# Patient Record
Sex: MALE | Race: WHITE | NOT HISPANIC OR LATINO | Employment: FULL TIME | URBAN - METROPOLITAN AREA
[De-identification: names, ages, dates, MRNs, and addresses within clinical notes are randomized per-mention and may not be internally consistent; named-entity substitution may affect disease eponyms.]

---

## 2017-06-06 ENCOUNTER — OFFICE VISIT (OUTPATIENT)
Dept: FAMILY MEDICINE | Facility: CLINIC | Age: 52
End: 2017-06-06
Payer: COMMERCIAL

## 2017-06-06 VITALS
WEIGHT: 219 LBS | HEIGHT: 69 IN | TEMPERATURE: 97.7 F | DIASTOLIC BLOOD PRESSURE: 66 MMHG | SYSTOLIC BLOOD PRESSURE: 102 MMHG | BODY MASS INDEX: 32.44 KG/M2 | HEART RATE: 64 BPM

## 2017-06-06 DIAGNOSIS — E78.5 HYPERLIPIDEMIA LDL GOAL <70: ICD-10-CM

## 2017-06-06 DIAGNOSIS — Z12.5 SCREENING FOR PROSTATE CANCER: ICD-10-CM

## 2017-06-06 DIAGNOSIS — I25.10 CARDIOVASCULAR DISEASE: ICD-10-CM

## 2017-06-06 DIAGNOSIS — N52.9 ERECTILE DYSFUNCTION, UNSPECIFIED ERECTILE DYSFUNCTION TYPE: ICD-10-CM

## 2017-06-06 DIAGNOSIS — I10 ESSENTIAL HYPERTENSION: ICD-10-CM

## 2017-06-06 DIAGNOSIS — Z00.00 ENCOUNTER FOR ROUTINE ADULT HEALTH EXAMINATION WITHOUT ABNORMAL FINDINGS: Primary | ICD-10-CM

## 2017-06-06 DIAGNOSIS — K21.9 GASTROESOPHAGEAL REFLUX DISEASE WITHOUT ESOPHAGITIS: ICD-10-CM

## 2017-06-06 LAB
ANION GAP SERPL CALCULATED.3IONS-SCNC: 4 MMOL/L (ref 3–14)
BUN SERPL-MCNC: 16 MG/DL (ref 7–30)
CALCIUM SERPL-MCNC: 9.1 MG/DL (ref 8.5–10.1)
CHLORIDE SERPL-SCNC: 102 MMOL/L (ref 94–109)
CHOLEST SERPL-MCNC: 115 MG/DL
CO2 SERPL-SCNC: 33 MMOL/L (ref 20–32)
CREAT SERPL-MCNC: 0.97 MG/DL (ref 0.66–1.25)
GFR SERPL CREATININE-BSD FRML MDRD: 81 ML/MIN/1.7M2
GLUCOSE SERPL-MCNC: 107 MG/DL (ref 70–99)
HDLC SERPL-MCNC: 37 MG/DL
LDLC SERPL CALC-MCNC: 60 MG/DL
NONHDLC SERPL-MCNC: 78 MG/DL
POTASSIUM SERPL-SCNC: 4.6 MMOL/L (ref 3.4–5.3)
PSA SERPL-ACNC: 0.56 UG/L (ref 0–4)
SODIUM SERPL-SCNC: 139 MMOL/L (ref 133–144)
TRIGL SERPL-MCNC: 91 MG/DL

## 2017-06-06 PROCEDURE — 36415 COLL VENOUS BLD VENIPUNCTURE: CPT | Performed by: FAMILY MEDICINE

## 2017-06-06 PROCEDURE — G0103 PSA SCREENING: HCPCS | Performed by: FAMILY MEDICINE

## 2017-06-06 PROCEDURE — 80048 BASIC METABOLIC PNL TOTAL CA: CPT | Performed by: FAMILY MEDICINE

## 2017-06-06 PROCEDURE — 99396 PREV VISIT EST AGE 40-64: CPT | Performed by: FAMILY MEDICINE

## 2017-06-06 PROCEDURE — 80061 LIPID PANEL: CPT | Performed by: FAMILY MEDICINE

## 2017-06-06 RX ORDER — NITROGLYCERIN 0.4 MG/1
0.4 TABLET SUBLINGUAL EVERY 5 MIN PRN
Qty: 25 TABLET | Refills: 3 | Status: ON HOLD | OUTPATIENT
Start: 2017-06-06 | End: 2017-12-20

## 2017-06-06 RX ORDER — SIMVASTATIN 40 MG
40 TABLET ORAL AT BEDTIME
Qty: 90 TABLET | Refills: 3 | Status: SHIPPED | OUTPATIENT
Start: 2017-06-06 | End: 2017-06-16

## 2017-06-06 RX ORDER — OMEPRAZOLE 20 MG/1
20 TABLET, DELAYED RELEASE ORAL DAILY
Qty: 90 TABLET | Refills: 3 | Status: SHIPPED | OUTPATIENT
Start: 2017-06-06 | End: 2018-06-11

## 2017-06-06 RX ORDER — TADALAFIL 20 MG/1
10-20 TABLET ORAL DAILY PRN
Qty: 6 TABLET | Refills: 11 | Status: ON HOLD | OUTPATIENT
Start: 2017-06-06 | End: 2017-12-20

## 2017-06-06 RX ORDER — ATENOLOL 50 MG/1
50 TABLET ORAL DAILY
Qty: 90 TABLET | Refills: 3 | Status: SHIPPED | OUTPATIENT
Start: 2017-06-06 | End: 2017-06-16

## 2017-06-06 RX ORDER — LISINOPRIL 10 MG/1
10 TABLET ORAL DAILY
Qty: 90 TABLET | Refills: 3 | Status: SHIPPED | OUTPATIENT
Start: 2017-06-06 | End: 2017-06-16

## 2017-06-06 NOTE — MR AVS SNAPSHOT
After Visit Summary   6/6/2017    Latrell Al    MRN: [id number]           Patient Information     Date Of Birth          1965        Visit Information        Provider Department      6/6/2017 8:20 AM Gerald Roche MD [facility]        Today's Diagnoses     Encounter for routine adult health examination without abnormal findings    -  1    Cardiovascular disease        Essential hypertension        Hyperlipidemia LDL goal <70        Erectile dysfunction, unspecified erectile dysfunction type        Screening for prostate cancer        Gastroesophageal reflux disease without esophagitis          Care Instructions    Please go to lab.    I have refilled all of your meds.    Your colonoscopy is due next year in 2018.      Preventive Health Recommendations  Male Ages 50 - 64    Yearly exam:             See your health care provider every year in order to  o   Review health changes.   o   Discuss preventive care.    o   Review your medicines if your doctor has prescribed any.     Have a cholesterol test every 5 years, or more frequently if you are at risk for high cholesterol/heart disease.     Have a diabetes test (fasting glucose) every three years. If you are at risk for diabetes, you should have this test more often.     Have a colonoscopy at age 50, or have a yearly FIT test (stool test). These exams will check for colon cancer.      Talk with your health care provider about whether or not a prostate cancer screening test (PSA) is right for you.    You should be tested each year for STDs (sexually transmitted diseases), if you re at risk.     Shots: Get a flu shot each year. Get a tetanus shot every 10 years.     Nutrition:    Eat at least 5 servings of fruits and vegetables daily.     Eat whole-grain bread, whole-wheat pasta and brown rice instead of white grains and rice.     Talk to your provider about Calcium and Vitamin D.     Lifestyle    Exercise for at least 150  "minutes a week (30 minutes a day, 5 days a week). This will help you control your weight and prevent disease.     Limit alcohol to one drink per day.     No smoking.     Wear sunscreen to prevent skin cancer.     See your dentist every six months for an exam and cleaning.     See your eye doctor every 1 to 2 years.            Follow-ups after your visit        Who to contact     If you have questions or need follow up information about today's clinic visit or your schedule please contact [facility] directly at [phone].  Normal or non-critical lab and imaging results will be communicated to you by Codacyhart, letter or phone within 4 business days after the clinic has received the results. If you do not hear from us within 7 days, please contact the clinic through Nevro or phone. If you have a critical or abnormal lab result, we will notify you by phone as soon as possible.  Submit refill requests through Nevro or call your pharmacy and they will forward the refill request to us. Please allow 3 business days for your refill to be completed.          Additional Information About Your Visit        Nevro Information     Nevro gives you secure access to your electronic health record. If you see a primary care provider, you can also send messages to your care team and make appointments. If you have questions, please call your primary care clinic.  If you do not have a primary care provider, please call [phone] and they will assist you.        Care EveryWhere ID     This is your Care EveryWhere ID. This could be used by other organizations to access your [place] medical records  [id number]        Your Vitals Were     Pulse Temperature Height BMI (Body Mass Index)          64 97.7  F (36.5  C) (Tympanic) 5' 9\" (1.753 m) 32.34 kg/m2         Blood Pressure from Last 3 Encounters:   06/06/17 102/66   06/03/16 132/75   07/13/15 116/82    Weight from Last 3 Encounters:   06/06/17 219 lb (99.3 kg) "   06/03/16 233 lb 6.4 oz (105.9 kg)   07/13/15 224 lb (101.6 kg)              We Performed the Following     Basic metabolic panel     LIPID REFLEX TO DIRECT LDL PANEL     Prostate spec antigen screen          Today's Medication Changes          These changes are accurate as of: 6/6/17  8:52 AM.  If you have any questions, ask your nurse or doctor.               These medicines have changed or have updated prescriptions.        Dose/Directions    tadalafil 20 MG tablet   Commonly known as:  CIALIS   This may have changed:  additional instructions   Used for:  Erectile dysfunction, unspecified erectile dysfunction type   Changed by:  Gerald Roche MD        Dose:  10-20 mg   Take 0.5-1 tablets (10-20 mg) by mouth daily as needed for erectile dysfunction Hold on file until needed   Quantity:  6 tablet   Refills:  11            Where to get your medicines      These medications were sent to [facility] [place], [place] - [address]  [address] [address]     Phone:  [phone]     atenolol 50 MG tablet    lisinopril 10 MG tablet    nitroglycerin 0.4 MG sublingual tablet    omeprazole 20 MG tablet    simvastatin 40 MG tablet    tadalafil 20 MG tablet                Primary Care Provider Office Phone # Fax #    Gerald Roche [phone]       [facility] [address] [address]        Thank you!     Thank you for choosing [facility]  for your care. Our goal is always to provide you with excellent care. Hearing back from our patients is one way we can continue to improve our services. Please take a few minutes to complete the written survey that you may receive in the mail after your visit with us. Thank you!             Your Updated Medication List - Protect others around you: Learn how to safely use, store and throw away your medicines at www.disposemymeds.org.          This list is accurate as of: 6/6/17  8:52 AM.   Always use your most recent med list.                   Brand Name Dispense Instructions for use    aspirin 81 MG tablet      ONE DAILY       atenolol 50 MG tablet    TENORMIN    90 tablet    Take 1 tablet (50 mg) by mouth daily       lisinopril 10 MG tablet    PRINIVIL/ZESTRIL    90 tablet    Take 1 tablet (10 mg) by mouth daily       nitroglycerin 0.4 MG sublingual tablet    NITROSTAT    25 tablet    Place 1 tablet (0.4 mg) under the tongue every 5 minutes as needed up to 3 tablets per episode       omeprazole 20 MG tablet    priLOSEC OTC    90 tablet    Take 1 tablet (20 mg) by mouth daily       simvastatin 40 MG tablet    ZOCOR    90 tablet    Take 1 tablet (40 mg) by mouth At Bedtime at bedtime.       tadalafil 20 MG tablet    CIALIS    6 tablet    Take 0.5-1 tablets (10-20 mg) by mouth daily as needed for erectile dysfunction Hold on file until needed

## 2017-06-06 NOTE — NURSING NOTE
"Chief Complaint   Patient presents with     Physical     is fasting for labs       Initial /66 (Cuff Size: Adult Large)  Pulse 64  Temp 97.7  F (36.5  C) (Tympanic)  Ht 5' 9\" (1.753 m)  Wt 219 lb (99.3 kg)  BMI 32.34 kg/m2 Estimated body mass index is 32.34 kg/(m^2) as calculated from the following:    Height as of this encounter: 5' 9\" (1.753 m).    Weight as of this encounter: 219 lb (99.3 kg).  Medication Reconciliation: complete  "

## 2017-06-06 NOTE — PROGRESS NOTES
SUBJECTIVE:     CC: Latrell Al is an 51 year old male who presents for preventative health visit.   Chief Complaint   Patient presents with     Physical     is fasting for labs       Healthy Habits:    Do you get at least three servings of calcium containing foods daily (dairy, green leafy vegetables, etc.)? yes    Amount of exercise or daily activities, outside of work: 7 day(s) per week    Problems taking medications regularly No    Medication side effects: No    Have you had an eye exam in the past two years? yes    Do you see a dentist twice per year? No    Do you have sleep apnea, excessive snoring or daytime drowsiness?no            Today's PHQ-2 Score:   PHQ-2 ( 1999 Pfizer) 6/6/2017 6/3/2016   Q1: Little interest or pleasure in doing things 0 0   Q2: Feeling down, depressed or hopeless 0 0   PHQ-2 Score 0 0       Abuse: Current or Past(Physical, Sexual or Emotional)- No  Do you feel safe in your environment - Yes    Social History   Substance Use Topics     Smoking status: Former Smoker     Packs/day: 0.50     Years: 25.00     Types: Cigarettes, Cigars     Quit date: 1/1/2008     Smokeless tobacco: Never Used     Alcohol use Yes      Comment: 3-6 cans of beer per month     The patient does not drink >3 drinks per day nor >7 drinks per week.    Last PSA:   PSA   Date Value Ref Range Status   06/03/2016 0.55 0 - 4 ug/L Final       Recent Labs   Lab Test  06/03/16   0955  06/01/15   1037  05/19/14   1650   CHOL  120  120  128   HDL  36*  45  44   LDL  61  51  62   TRIG  114  120  107   CHOLHDLRATIO   --   2.7  3.0   NHDL  84   --    --        Reviewed orders with patient. Reviewed health maintenance and updated orders accordingly - Yes    Reviewed and updated as needed this visit by clinical staff  Tobacco  Allergies  Med Hx  Surg Hx  Fam Hx  Soc Hx        Reviewed and updated as needed this visit by Provider            ROS:  Review Of Systems  Skin: negative  Eyes: negative  Ears/Nose/Throat:  "negative  Respiratory: No shortness of breath, dyspnea on exertion, cough, or hemoptysis  Cardiovascular: negative  Gastrointestinal: negative  Genitourinary: negative  Musculoskeletal: negative  Neurologic: negative  Psychiatric: negative  Hematologic/Lymphatic/Immunologic: negative  Endocrine: negative       Problem list, Medication list, Allergies, and Medical/Social/Surgical histories reviewed in [facility] and updated as appropriate.  OBJECTIVE:     /66 (Cuff Size: Adult Large)  Pulse 64  Temp 97.7  F (36.5  C) (Tympanic)  Ht 5' 9\" (1.753 m)  Wt 219 lb (99.3 kg)  BMI 32.34 kg/m2  EXAM:  GENERAL: healthy, alert and no distress  EYES: Eyes grossly normal to inspection, PERRL and conjunctivae and sclerae normal  HENT: ear canals and TM's normal, nose and mouth without ulcers or lesions  NECK: no adenopathy, no asymmetry, masses, or scars and thyroid normal to palpation  RESP: lungs clear to auscultation - no rales, rhonchi or wheezes  CV: regular rate and rhythm, normal S1 S2, no S3 or S4, no murmur, click or rub, no peripheral edema and peripheral pulses strong  ABDOMEN: soft, nontender, no hepatosplenomegaly, no masses and bowel sounds normal   (male): normal male genitalia without lesions or urethral discharge, no hernia  MS: no gross musculoskeletal defects noted, no edema  SKIN: no suspicious lesions or rashes  NEURO: Normal strength and tone, mentation intact and speech normal  PSYCH: mentation appears normal, affect normal/bright  LYMPH: anterior cervical: no adenopathy  posterior cervical: no adenopathy    ASSESSMENT/PLAN:     (Z00.00) Encounter for routine adult health examination without abnormal findings  (primary encounter diagnosis)  Comment: Discussed healthy lifestyle and preventative cares.    Plan:     (I25.10) Cardiovascular disease  Comment: stable  Plan: LIPID REFLEX TO DIRECT LDL PANEL, lisinopril         (PRINIVIL/ZESTRIL) 10 MG tablet, atenolol         (TENORMIN) 50 MG tablet, " "nitroglycerin         (NITROSTAT) 0.4 MG sublingual tablet            (I10) Essential hypertension  Comment: controlled  Plan: Basic metabolic panel        Refilled med and check for side effects    (E78.5) Hyperlipidemia LDL goal <70  Comment: check labs and refilled med  Plan: LIPID REFLEX TO DIRECT LDL PANEL, simvastatin         (ZOCOR) 40 MG tablet            (N52.9) Erectile dysfunction, unspecified erectile dysfunction type  Comment: doing well, using med prn  Plan: tadalafil (CIALIS) 20 MG tablet            (Z12.5) Screening for prostate cancer  Comment:   Plan: Prostate spec antigen screen            (K21.9) Gastroesophageal reflux disease without esophagitis  Comment: controlled  Plan: omeprazole (PRILOSEC OTC) 20 MG tablet              COUNSELING:  Reviewed preventive health counseling, as reflected in patient instructions       Regular exercise       Healthy diet/nutrition       Vision screening       Hearing screening       Colon cancer screening       Prostate cancer screening         reports that he quit smoking about 9 years ago. His smoking use included Cigarettes and Cigars. He has a 12.50 pack-year smoking history. He has never used smokeless tobacco.    Estimated body mass index is 32.34 kg/(m^2) as calculated from the following:    Height as of this encounter: 5' 9\" (1.753 m).    Weight as of this encounter: 219 lb (99.3 kg).   Weight management plan: diet and exercise    Counseling Resources:  ATP IV Guidelines  Pooled Cohorts Equation Calculator  FRAX Risk Assessment  ICSI Preventive Guidelines  Dietary Guidelines for Americans, 2010  USDA's MyPlate  ASA Prophylaxis  Lung CA Screening    Gerald Roche MD  [facility]  "

## 2017-06-06 NOTE — PATIENT INSTRUCTIONS
Please go to lab.    I have refilled all of your meds.    Your colonoscopy is due next year in 2018.      Preventive Health Recommendations  Male Ages 50   64    Yearly exam:             See your health care provider every year in order to  o   Review health changes.   o   Discuss preventive care.    o   Review your medicines if your doctor has prescribed any.     Have a cholesterol test every 5 years, or more frequently if you are at risk for high cholesterol/heart disease.     Have a diabetes test (fasting glucose) every three years. If you are at risk for diabetes, you should have this test more often.     Have a colonoscopy at age 50, or have a yearly FIT test (stool test). These exams will check for colon cancer.      Talk with your health care provider about whether or not a prostate cancer screening test (PSA) is right for you.    You should be tested each year for STDs (sexually transmitted diseases), if you re at risk.     Shots: Get a flu shot each year. Get a tetanus shot every 10 years.     Nutrition:    Eat at least 5 servings of fruits and vegetables daily.     Eat whole-grain bread, whole-wheat pasta and brown rice instead of white grains and rice.     Talk to your provider about Calcium and Vitamin D.     Lifestyle    Exercise for at least 150 minutes a week (30 minutes a day, 5 days a week). This will help you control your weight and prevent disease.     Limit alcohol to one drink per day.     No smoking.     Wear sunscreen to prevent skin cancer.     See your dentist every six months for an exam and cleaning.     See your eye doctor every 1 to 2 years.

## 2017-06-06 NOTE — LETTER
[facility]  [address] [place] [address]  Phone: [phone]    June 7, 2017    Latrell Al  [address] [place] [address]          Dear Mr. Al,    The results of your recent lab tests were within normal limits. Enclosed is a copy of these results.  If you have any further questions or problems, please contact our office.      Yaa Sams,   You have no signs of prostate cancer.   You have normal kidney function.   Your cholesterol is looking good.   No signs of diabetes.   Recheck labs in one year.       Component      Latest Ref Rng & Units 6/6/2017   Sodium      133 - 144 mmol/L 139   Potassium      3.4 - 5.3 mmol/L 4.6   Chloride      94 - 109 mmol/L 102   Carbon Dioxide      20 - 32 mmol/L 33 (H)   Anion Gap      3 - 14 mmol/L 4   Glucose      70 - 99 mg/dL 107 (H)   Urea Nitrogen      7 - 30 mg/dL 16   Creatinine      0.66 - 1.25 mg/dL 0.97   GFR Estimate      >60 mL/min/1.7m2 81   GFR Estimate If Black      >60 mL/min/1.7m2 >90 . . .   Calcium      8.5 - 10.1 mg/dL 9.1   Cholesterol      <200 mg/dL 115   Triglycerides      <150 mg/dL 91   HDL Cholesterol      >39 mg/dL 37 (L)   LDL Cholesterol Calculated      <100 mg/dL 60   Non HDL Cholesterol      <130 mg/dL 78   PSA      0 - 4 ug/L 0.56     Sincerely,      Gerald Roche MD / BS

## 2017-06-11 DIAGNOSIS — I10 ESSENTIAL HYPERTENSION: ICD-10-CM

## 2017-06-11 DIAGNOSIS — I25.10 CARDIOVASCULAR DISEASE: ICD-10-CM

## 2017-06-11 DIAGNOSIS — E78.5 HYPERLIPIDEMIA LDL GOAL <70: ICD-10-CM

## 2017-06-16 ENCOUNTER — MYC MEDICAL ADVICE (OUTPATIENT)
Dept: FAMILY MEDICINE | Facility: CLINIC | Age: 52
End: 2017-06-16

## 2017-06-16 DIAGNOSIS — I25.10 CARDIOVASCULAR DISEASE: ICD-10-CM

## 2017-06-16 DIAGNOSIS — E78.5 HYPERLIPIDEMIA LDL GOAL <70: ICD-10-CM

## 2017-06-16 RX ORDER — ATENOLOL 50 MG/1
50 TABLET ORAL DAILY
Qty: 90 TABLET | Refills: 3 | Status: SHIPPED | OUTPATIENT
Start: 2017-06-16 | End: 2018-06-11

## 2017-06-16 RX ORDER — LISINOPRIL 10 MG/1
10 TABLET ORAL DAILY
Qty: 90 TABLET | Refills: 3 | Status: SHIPPED | OUTPATIENT
Start: 2017-06-16 | End: 2018-06-11

## 2017-06-16 RX ORDER — SIMVASTATIN 40 MG
40 TABLET ORAL AT BEDTIME
Qty: 90 TABLET | Refills: 3 | Status: SHIPPED | OUTPATIENT
Start: 2017-06-16 | End: 2018-06-11

## 2017-06-20 RX ORDER — SIMVASTATIN 40 MG
TABLET ORAL
Qty: 90 TABLET | Refills: 3 | OUTPATIENT
Start: 2017-06-20

## 2017-06-20 RX ORDER — ATENOLOL 50 MG/1
TABLET ORAL
Qty: 90 TABLET | Refills: 3 | OUTPATIENT
Start: 2017-06-20

## 2017-12-14 ENCOUNTER — OFFICE VISIT (OUTPATIENT)
Dept: FAMILY MEDICINE | Facility: CLINIC | Age: 52
End: 2017-12-14
Payer: COMMERCIAL

## 2017-12-14 ENCOUNTER — RADIANT APPOINTMENT (OUTPATIENT)
Dept: GENERAL RADIOLOGY | Facility: CLINIC | Age: 52
End: 2017-12-14
Attending: NURSE PRACTITIONER
Payer: COMMERCIAL

## 2017-12-14 VITALS
BODY MASS INDEX: 34.26 KG/M2 | WEIGHT: 232 LBS | TEMPERATURE: 97.2 F | OXYGEN SATURATION: 99 % | DIASTOLIC BLOOD PRESSURE: 78 MMHG | SYSTOLIC BLOOD PRESSURE: 125 MMHG | HEART RATE: 81 BPM

## 2017-12-14 DIAGNOSIS — M79.671 RIGHT FOOT PAIN: ICD-10-CM

## 2017-12-14 DIAGNOSIS — M79.671 RIGHT FOOT PAIN: Primary | ICD-10-CM

## 2017-12-14 DIAGNOSIS — L03.115 CELLULITIS OF RIGHT LOWER EXTREMITY: ICD-10-CM

## 2017-12-14 LAB
CRP SERPL-MCNC: 43.4 MG/L (ref 0–8)
ERYTHROCYTE [DISTWIDTH] IN BLOOD BY AUTOMATED COUNT: 13.9 % (ref 10–15)
ERYTHROCYTE [SEDIMENTATION RATE] IN BLOOD BY WESTERGREN METHOD: 10 MM/H (ref 0–20)
HCT VFR BLD AUTO: 45 % (ref 40–53)
HGB BLD-MCNC: 14.9 G/DL (ref 13.3–17.7)
MCH RBC QN AUTO: 30 PG (ref 26.5–33)
MCHC RBC AUTO-ENTMCNC: 33.1 G/DL (ref 31.5–36.5)
MCV RBC AUTO: 91 FL (ref 78–100)
PLATELET # BLD AUTO: 186 10E9/L (ref 150–450)
RBC # BLD AUTO: 4.97 10E12/L (ref 4.4–5.9)
URATE SERPL-MCNC: 5.3 MG/DL (ref 3.5–7.2)
WBC # BLD AUTO: 13.6 10E9/L (ref 4–11)

## 2017-12-14 PROCEDURE — 86140 C-REACTIVE PROTEIN: CPT | Performed by: NURSE PRACTITIONER

## 2017-12-14 PROCEDURE — 99214 OFFICE O/P EST MOD 30 MIN: CPT | Performed by: NURSE PRACTITIONER

## 2017-12-14 PROCEDURE — 85027 COMPLETE CBC AUTOMATED: CPT | Performed by: NURSE PRACTITIONER

## 2017-12-14 PROCEDURE — 73660 X-RAY EXAM OF TOE(S): CPT | Mod: RT

## 2017-12-14 PROCEDURE — 84550 ASSAY OF BLOOD/URIC ACID: CPT | Performed by: NURSE PRACTITIONER

## 2017-12-14 PROCEDURE — 85652 RBC SED RATE AUTOMATED: CPT | Performed by: NURSE PRACTITIONER

## 2017-12-14 PROCEDURE — 36415 COLL VENOUS BLD VENIPUNCTURE: CPT | Performed by: NURSE PRACTITIONER

## 2017-12-14 RX ORDER — CEPHALEXIN 500 MG/1
500 CAPSULE ORAL 3 TIMES DAILY
Qty: 30 CAPSULE | Refills: 0 | Status: ON HOLD | OUTPATIENT
Start: 2017-12-14 | End: 2017-12-20

## 2017-12-14 NOTE — LETTER
December 18, 2017      Latrell Al  [address] [address]        Dear ,    We are writing to inform you of your test results.  Latrell,     Uric acid level was normal- no indication of gout.   Component      Latest Ref Rng & Units 12/14/2017   Uric Acid      3.5 - 7.2 mg/dL 5.3     If you have any questions or concerns, please call the clinic at the number listed above.       Sincerely,        SHAJI Rodriguez CNP

## 2017-12-14 NOTE — PATIENT INSTRUCTIONS
1. Labs and x-ray today  2. Start taking antibiotics three times a day for 10 days          Thank you for choosing [facility].  You may be receiving a survey in the mail from Murray Gloria regarding your visit today.  Please take a few minutes to complete and return the survey to let us know how we are doing.      If you have questions or concerns, please contact us via Vita Sound or you can contact your care team at [phone].    Our Clinic hours are:  Monday 6:40 am  to 7:00 pm  Tuesday -Friday 6:40 am to 5:00 pm    The [place] outpatient lab hours are:  Monday - Friday 6:10 am to 4:45 pm  Saturdays 7:00 am to 11:00 am  Appointments are required, call [phone]    If you have clinical questions after hours or would like to schedule an appointment,  call the clinic at [phone].

## 2017-12-14 NOTE — MR AVS SNAPSHOT
After Visit Summary   12/14/2017    Latrell Al    MRN: [id number]           Patient Information     Date Of Birth          1965        Visit Information        Provider Department      12/14/2017 7:20 AM Graciela Soto APRN CNP [facility]        Today's Diagnoses     Right foot pain    -  1    Cellulitis of right lower extremity          Care Instructions    1. Labs and x-ray today  2. Start taking antibiotics three times a day for 10 days          Thank you for choosing [facility].  You may be receiving a survey in the mail from [employer]is[employer] regarding your visit today.  Please take a few minutes to complete and return the survey to let us know how we are doing.      If you have questions or concerns, please contact us via SkyTech or you can contact your care team at [phone].    Our Clinic hours are:  Monday 6:40 am  to 7:00 pm  Tuesday -Friday 6:40 am to 5:00 pm    The [place] outpatient lab hours are:  Monday - Friday 6:10 am to 4:45 pm  Saturdays 7:00 am to 11:00 am  Appointments are required, call [phone]    If you have clinical questions after hours or would like to schedule an appointment,  call the clinic at [phone].          Follow-ups after your visit        Who to contact     If you have questions or need follow up information about today's clinic visit or your schedule please contact [facility] directly at [phone].  Normal or non-critical lab and imaging results will be communicated to you by Ooshothart, letter or phone within 4 business days after the clinic has received the results. If you do not hear from us within 7 days, please contact the clinic through Vicampot or phone. If you have a critical or abnormal lab result, we will notify you by phone as soon as possible.  Submit refill requests through SkyTech or call your pharmacy and they will forward the refill request to us. Please allow 3 business days for your refill to  be completed.          Additional Information About Your Visit        The Coveteurhart Information     [employer] gives you secure access to your electronic health record. If you see a primary care provider, you can also send messages to your care team and make appointments. If you have questions, please call your primary care clinic.  If you do not have a primary care provider, please call [phone] and they will assist you.        Care EveryWhere ID     This is your Care EveryWhere ID. This could be used by other organizations to access your [place] medical records  [id number]        Your Vitals Were     Pulse Temperature Pulse Oximetry BMI (Body Mass Index)          81 97.2  F (36.2  C) (Tympanic) 99% 34.26 kg/m2         Blood Pressure from Last 3 Encounters:   12/14/17 125/78   06/06/17 102/66   06/03/16 132/75    Weight from Last 3 Encounters:   12/14/17 232 lb (105.2 kg)   06/06/17 219 lb (99.3 kg)   06/03/16 233 lb 6.4 oz (105.9 kg)              We Performed the Following     CBC with platelets     CRP inflammation     Erythrocyte sedimentation rate auto     Uric acid          Today's Medication Changes          These changes are accurate as of: 12/14/17  8:22 AM.  If you have any questions, ask your nurse or doctor.               Start taking these medicines.        Dose/Directions    cephALEXin 500 MG capsule   Commonly known as:  KEFLEX   Used for:  Cellulitis of right lower extremity   Started by:  Graciela Soto APRN CNP        Dose:  500 mg   Take 1 capsule (500 mg) by mouth 3 times daily   Quantity:  30 capsule   Refills:  0            Where to get your medicines      These medications were sent to [facility] [address]  [address] [address]     Phone:  [phone]     cephALEXin 500 MG capsule                Primary Care Provider Office Phone # Fax #    Gerald Roche [phone] [address] [address]         Equal Access to Services     [facility] : Hadii aad ku hadcarmelinabrad Julissaali, waaxda luqadaha, qaybta kaalmairam perez, veronique flor. So [facility] [phone].    ATENCIÓN: Si habla español, tiene a adam disposición servicios gratuitos de asistencia lingüística. Janyame al [phone].    We comply with applicable federal civil rights laws and [place] laws. We do not discriminate on the basis of race, color, national origin, age, disability, sex, sexual orientation, or gender identity.            Thank you!     Thank you for choosing [facility]  for your care. Our goal is always to provide you with excellent care. Hearing back from our patients is one way we can continue to improve our services. Please take a few minutes to complete the written survey that you may receive in the mail after your visit with us. Thank you!             Your Updated Medication List - Protect others around you: Learn how to safely use, store and throw away your medicines at www.disposemymeds.org.          This list is accurate as of: 12/14/17  8:22 AM.  Always use your most recent med list.                   Brand Name Dispense Instructions for use Diagnosis    aspirin 81 MG tablet      ONE DAILY        atenolol 50 MG tablet    TENORMIN    90 tablet    Take 1 tablet (50 mg) by mouth daily    Cardiovascular disease       cephALEXin 500 MG capsule    KEFLEX    30 capsule    Take 1 capsule (500 mg) by mouth 3 times daily    Cellulitis of right lower extremity       lisinopril 10 MG tablet    PRINIVIL/ZESTRIL    90 tablet    Take 1 tablet (10 mg) by mouth daily    Cardiovascular disease       nitroGLYcerin 0.4 MG sublingual tablet    NITROSTAT    25 tablet    Place 1 tablet (0.4 mg) under the tongue every 5 minutes as needed up to 3 tablets per episode    Cardiovascular disease       omeprazole 20 MG tablet    priLOSEC OTC    90 tablet    Take 1 tablet (20 mg) by mouth daily    Gastroesophageal reflux  disease without esophagitis       simvastatin 40 MG tablet    ZOCOR    90 tablet    Take 1 tablet (40 mg) by mouth At Bedtime at bedtime.    Hyperlipidemia LDL goal <70       tadalafil 20 MG tablet    CIALIS    6 tablet    Take 0.5-1 tablets (10-20 mg) by mouth daily as needed for erectile dysfunction Hold on file until needed    Erectile dysfunction, unspecified erectile dysfunction type

## 2017-12-14 NOTE — PROGRESS NOTES
SUBJECTIVE:   Latrell Al is a 52 year old male who presents to clinic today for the following health issues:      Musculoskeletal problem/pain      Duration: 5 days- patient tripped and hit 4th toe against a wall    Description  Location: right foot    Intensity:  moderate    Accompanying signs and symptoms: swelling and redness, warmth - entire foot    History  Previous similar problem: no   Previous evaluation:  none    Precipitating or alleviating factors:  Trauma or overuse: YES- ran in to a shoe/boot tray at home, then increased pain after going up stairs  Aggravating factors include: cold    Therapies tried and outcome: ice, made it burn        -------------------------------------    Problem list and histories reviewed & adjusted, as indicated.  Additional history: as documented    Patient Active Problem List   Diagnosis     Cardiovascular disease     Essential hypertension     Hyperlipidemia LDL goal <70     GERD (gastroesophageal reflux disease)     ED (erectile dysfunction)     Past Surgical History:   Procedure Laterality Date     COLONOSCOPY       COLONOSCOPY N/A 7/13/2015    Procedure: COLONOSCOPY;  Surgeon: Maycol Singer MD;  Location: [facility]     SURGICAL HISTORY OF -   12/10/2004    Right Knee Medial Meniscectomy     SURGICAL HISTORY OF -   9/4/2001    EGD     SURGICAL HISTORY OF -   2/7/1995    Partial medial meniscectomy posteromedial horn meniscus, right knee       Social History   Substance Use Topics     Smoking status: Former Smoker     Packs/day: 0.50     Years: 25.00     Types: Cigarettes, Cigars     Quit date: 1/1/2008     Smokeless tobacco: Never Used     Alcohol use Yes      Comment: 3-6 cans of beer per month     Family History   Problem Relation Age of Onset     C.A.D. Father      MI 42     CANCER Father      kidney     Hypertension Mother      DIABETES Mother      HEART DISEASE Mother      CABG     CANCER Mother      lung     Breast Cancer Sister      CANCER Maternal  Grandmother      skin     Cancer - colorectal No family hx of      Prostate Cancer No family hx of              Reviewed and updated as needed this visit by clinical staff     Reviewed and updated as needed this visit by Provider         ROS:  Constitutional, HEENT, cardiovascular, pulmonary, GI, , musculoskeletal, neuro, skin, endocrine and psych systems are negative, except as otherwise noted.      OBJECTIVE:   /78 (BP Location: Left arm, Patient Position: Chair, Cuff Size: Adult Large)  Pulse 81  Temp 97.2  F (36.2  C) (Tympanic)  Wt 232 lb (105.2 kg)  SpO2 99%  BMI 34.26 kg/m2  Body mass index is 34.26 kg/(m^2).  GENERAL APPEARANCE: healthy, alert and no distress  RESP: unlabored  ORTHO: Foot Exam: Inspection:  swelling and redness diffuse, midfoot and forefoot- open skin area under 4th toe.   Tender::4th metatarsal    Range of Motion:flexion of toes:  full, extension of toes  full      Diagnostic Test Results:  none     ASSESSMENT/PLAN:       1. Right foot pain  ? Fracture vs gout vs cellulitis   - XR Toe Right G/E 2 Views; Future  - Uric acid  - Erythrocyte sedimentation rate auto  - CRP inflammation  - CBC with platelets    2. Cellulitis of right lower extremity  Will treat patient for cellulitis- x-ray of foot was negative for fracture. Uric acid level was normal  WBC elevated at 13.6 and CRP level was elevated at 43.4 (nomral 0-8).    - cephALEXin (KEFLEX) 500 MG capsule; Take 1 capsule (500 mg) by mouth 3 times daily  Dispense: 30 capsule; Refill: 0  - recommend follow up in 5-7 days or sooner prn   - monitor for streaking         SHAJI Rodriguez CNP  [facility]

## 2017-12-14 NOTE — NURSING NOTE
"Initial /78 (BP Location: Left arm, Patient Position: Chair, Cuff Size: Adult Large)  Pulse 81  Temp 97.2  F (36.2  C) (Tympanic)  Wt 232 lb (105.2 kg)  SpO2 99%  BMI 34.26 kg/m2 Estimated body mass index is 34.26 kg/(m^2) as calculated from the following:    Height as of 6/6/17: 5' 9\" (1.753 m).    Weight as of this encounter: 232 lb (105.2 kg). .    Soledad Knutson    "

## 2017-12-15 ENCOUNTER — HOSPITAL ENCOUNTER (INPATIENT)
Facility: CLINIC | Age: 52
LOS: 4 days | Discharge: HOME | DRG: 603 | End: 2017-12-20
Attending: EMERGENCY MEDICINE | Admitting: INTERNAL MEDICINE
Payer: COMMERCIAL

## 2017-12-15 ENCOUNTER — TELEPHONE (OUTPATIENT)
Dept: FAMILY MEDICINE | Facility: CLINIC | Age: 52
End: 2017-12-15

## 2017-12-15 DIAGNOSIS — I25.10 CARDIOVASCULAR DISEASE: ICD-10-CM

## 2017-12-15 DIAGNOSIS — S90.921A: ICD-10-CM

## 2017-12-15 DIAGNOSIS — N52.9 ERECTILE DYSFUNCTION, UNSPECIFIED ERECTILE DYSFUNCTION TYPE: ICD-10-CM

## 2017-12-15 DIAGNOSIS — B35.3 TINEA PEDIS OF BOTH FEET: Primary | ICD-10-CM

## 2017-12-15 DIAGNOSIS — L08.9: ICD-10-CM
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
## 2017-12-15 DIAGNOSIS — S91.114A LACERATION OF LESSER TOE OF RIGHT FOOT WITHOUT FOREIGN BODY PRESENT OR DAMAGE TO NAIL, INITIAL ENCOUNTER: ICD-10-CM

## 2017-12-15 DIAGNOSIS — S91.114A LACERATION OF FIFTH TOE OF RIGHT FOOT, INITIAL ENCOUNTER: ICD-10-CM

## 2017-12-15 DIAGNOSIS — L03.115 CELLULITIS OF RIGHT LOWER EXTREMITY: Primary | ICD-10-CM

## 2017-12-15 DIAGNOSIS — L08.9 RIGHT FOOT INFECTION: ICD-10-CM

## 2017-12-15 LAB
ANION GAP SERPL CALCULATED.3IONS-SCNC: 9 MMOL/L (ref 3–14)
BASOPHILS # BLD AUTO: 0 10E9/L (ref 0–0.2)
BASOPHILS NFR BLD AUTO: 0.1 %
BUN SERPL-MCNC: 11 MG/DL (ref 7–30)
CALCIUM SERPL-MCNC: 8.3 MG/DL (ref 8.5–10.1)
CHLORIDE SERPL-SCNC: 107 MMOL/L (ref 94–109)
CO2 SERPL-SCNC: 27 MMOL/L (ref 20–32)
CREAT SERPL-MCNC: 0.81 MG/DL (ref 0.66–1.25)
DIFFERENTIAL METHOD BLD: ABNORMAL
EOSINOPHIL # BLD AUTO: 0.3 10E9/L (ref 0–0.7)
EOSINOPHIL NFR BLD AUTO: 2.2 %
ERYTHROCYTE [DISTWIDTH] IN BLOOD BY AUTOMATED COUNT: 14.2 % (ref 10–15)
GFR SERPL CREATININE-BSD FRML MDRD: >90 ML/MIN/1.7M2
GLUCOSE SERPL-MCNC: 136 MG/DL (ref 70–99)
HCT VFR BLD AUTO: 44.6 % (ref 40–53)
HGB BLD-MCNC: 15 G/DL (ref 13.3–17.7)
IMM GRANULOCYTES # BLD: 0 10E9/L (ref 0–0.4)
IMM GRANULOCYTES NFR BLD: 0.3 %
LYMPHOCYTES # BLD AUTO: 2.6 10E9/L (ref 0.8–5.3)
LYMPHOCYTES NFR BLD AUTO: 17.5 %
MCH RBC QN AUTO: 29.8 PG (ref 26.5–33)
MCHC RBC AUTO-ENTMCNC: 33.6 G/DL (ref 31.5–36.5)
MCV RBC AUTO: 89 FL (ref 78–100)
MONOCYTES # BLD AUTO: 1 10E9/L (ref 0–1.3)
MONOCYTES NFR BLD AUTO: 6.6 %
NEUTROPHILS # BLD AUTO: 10.8 10E9/L (ref 1.6–8.3)
NEUTROPHILS NFR BLD AUTO: 73.3 %
PLATELET # BLD AUTO: 219 10E9/L (ref 150–450)
POTASSIUM SERPL-SCNC: 3.7 MMOL/L (ref 3.4–5.3)
RBC # BLD AUTO: 5.04 10E12/L (ref 4.4–5.9)
SODIUM SERPL-SCNC: 143 MMOL/L (ref 133–144)
WBC # BLD AUTO: 14.7 10E9/L (ref 4–11)

## 2017-12-15 PROCEDURE — 96365 THER/PROPH/DIAG IV INF INIT: CPT | Performed by: EMERGENCY MEDICINE

## 2017-12-15 PROCEDURE — 85025 COMPLETE CBC W/AUTO DIFF WBC: CPT | Performed by: EMERGENCY MEDICINE

## 2017-12-15 PROCEDURE — G0378 HOSPITAL OBSERVATION PER HR: HCPCS

## 2017-12-15 PROCEDURE — 80048 BASIC METABOLIC PNL TOTAL CA: CPT | Performed by: EMERGENCY MEDICINE

## 2017-12-15 PROCEDURE — 99285 EMERGENCY DEPT VISIT HI MDM: CPT | Mod: 25 | Performed by: EMERGENCY MEDICINE

## 2017-12-15 PROCEDURE — 99219 ZZC INITIAL OBSERVATION CARE,LEVL II: CPT | Performed by: INTERNAL MEDICINE

## 2017-12-15 PROCEDURE — 96375 TX/PRO/DX INJ NEW DRUG ADDON: CPT | Performed by: EMERGENCY MEDICINE

## 2017-12-15 PROCEDURE — 96376 TX/PRO/DX INJ SAME DRUG ADON: CPT

## 2017-12-15 PROCEDURE — 25000132 ZZH RX MED GY IP 250 OP 250 PS 637: Performed by: INTERNAL MEDICINE

## 2017-12-15 PROCEDURE — 25000132 ZZH RX MED GY IP 250 OP 250 PS 637: Performed by: EMERGENCY MEDICINE

## 2017-12-15 PROCEDURE — 87641 MR-STAPH DNA AMP PROBE: CPT | Performed by: INTERNAL MEDICINE

## 2017-12-15 PROCEDURE — 25000128 H RX IP 250 OP 636: Performed by: EMERGENCY MEDICINE

## 2017-12-15 PROCEDURE — 87640 STAPH A DNA AMP PROBE: CPT | Performed by: INTERNAL MEDICINE

## 2017-12-15 RX ORDER — LISINOPRIL 10 MG/1
10 TABLET ORAL EVERY EVENING
Status: DISCONTINUED | OUTPATIENT
Start: 2017-12-15 | End: 2017-12-20 | Stop reason: HOSPADM

## 2017-12-15 RX ORDER — HYDROMORPHONE HYDROCHLORIDE 1 MG/ML
0.5 INJECTION, SOLUTION INTRAMUSCULAR; INTRAVENOUS; SUBCUTANEOUS
Status: COMPLETED | OUTPATIENT
Start: 2017-12-15 | End: 2017-12-16

## 2017-12-15 RX ORDER — ASPIRIN 81 MG/1
81 TABLET, CHEWABLE ORAL DAILY
Status: DISCONTINUED | OUTPATIENT
Start: 2017-12-16 | End: 2017-12-20 | Stop reason: HOSPADM

## 2017-12-15 RX ORDER — ONDANSETRON 2 MG/ML
4 INJECTION INTRAMUSCULAR; INTRAVENOUS EVERY 6 HOURS PRN
Status: DISCONTINUED | OUTPATIENT
Start: 2017-12-15 | End: 2017-12-20 | Stop reason: HOSPADM

## 2017-12-15 RX ORDER — ATENOLOL 50 MG/1
50 TABLET ORAL EVERY EVENING
Status: DISCONTINUED | OUTPATIENT
Start: 2017-12-15 | End: 2017-12-18

## 2017-12-15 RX ORDER — ONDANSETRON 4 MG/1
4 TABLET, ORALLY DISINTEGRATING ORAL EVERY 6 HOURS PRN
Status: DISCONTINUED | OUTPATIENT
Start: 2017-12-15 | End: 2017-12-20 | Stop reason: HOSPADM

## 2017-12-15 RX ORDER — HYDROMORPHONE HYDROCHLORIDE 1 MG/ML
0.5 INJECTION, SOLUTION INTRAMUSCULAR; INTRAVENOUS; SUBCUTANEOUS EVERY 30 MIN PRN
Status: DISCONTINUED | OUTPATIENT
Start: 2017-12-15 | End: 2017-12-16 | Stop reason: DRUGHIGH

## 2017-12-15 RX ORDER — ACETAMINOPHEN 325 MG/1
650 TABLET ORAL EVERY 4 HOURS PRN
Status: DISCONTINUED | OUTPATIENT
Start: 2017-12-15 | End: 2017-12-17

## 2017-12-15 RX ORDER — NALOXONE HYDROCHLORIDE 0.4 MG/ML
.1-.4 INJECTION, SOLUTION INTRAMUSCULAR; INTRAVENOUS; SUBCUTANEOUS
Status: DISCONTINUED | OUTPATIENT
Start: 2017-12-15 | End: 2017-12-20 | Stop reason: HOSPADM

## 2017-12-15 RX ADMIN — Medication 0.5 MG: at 23:18

## 2017-12-15 RX ADMIN — Medication 0.5 MG: at 20:28

## 2017-12-15 RX ADMIN — CEFAZOLIN SODIUM 1 G: 1 INJECTION, SOLUTION INTRAVENOUS at 19:49

## 2017-12-15 RX ADMIN — LISINOPRIL 10 MG: 10 TABLET ORAL at 23:21

## 2017-12-15 RX ADMIN — ACETAMINOPHEN 650 MG: 325 TABLET, FILM COATED ORAL at 23:18

## 2017-12-15 RX ADMIN — ATENOLOL 50 MG: 50 TABLET ORAL at 23:21

## 2017-12-15 ASSESSMENT — ENCOUNTER SYMPTOMS
RESPIRATORY NEGATIVE: 1
CONSTITUTIONAL NEGATIVE: 1
EYES NEGATIVE: 1
NEUROLOGICAL NEGATIVE: 1
MUSCULOSKELETAL NEGATIVE: 1
GASTROINTESTINAL NEGATIVE: 1
ALLERGIC/IMMUNOLOGIC NEGATIVE: 1
WOUND: 1
ENDOCRINE NEGATIVE: 1
PSYCHIATRIC NEGATIVE: 1
HEMATOLOGIC/LYMPHATIC NEGATIVE: 1

## 2017-12-15 ASSESSMENT — PAIN DESCRIPTION - DESCRIPTORS: DESCRIPTORS: ACHING;DULL

## 2017-12-15 NOTE — TELEPHONE ENCOUNTER
I routed to provider this morning but have not heard back.    Routed to out of office provider pool - please see below.  Please advise.  Thank you.    Mariely Barkley RN

## 2017-12-15 NOTE — IP AVS SNAPSHOT
[facility]    [address] [address]    Phone:  [phone]    Fax:  [phone]                                       After Visit Summary   12/15/2017    Latrell Al    MRN: [id number]           After Visit Summary Signature Page     I have received my discharge instructions, and my questions have been answered. I have discussed any challenges I see with this plan with the nurse or doctor.    ..........................................................................................................................................  Patient/Patient Representative Signature      ..........................................................................................................................................  Patient Representative Print Name and Relationship to Patient    ..................................................               ................................................  Date                                            Time    ..........................................................................................................................................  Reviewed by Signature/Title    ...................................................              ..............................................  Date                                                            Time

## 2017-12-15 NOTE — TELEPHONE ENCOUNTER
Covering for provider:  Rx for crutches printed.  If Juliana didn't feel that stronger pain medication was indicated, I'm not super comfortable providing that.  Would recommend he try alternating between ibuprofen and Tylenol.  Antibiotics should help with pain as infection clears.  Return for follow-up if pain still not improving.      Augusto Trammell MD

## 2017-12-15 NOTE — TELEPHONE ENCOUNTER
I called pt back.    I explained that Dr Roche was out of clinic unexpectedly today and before we knew of it this morning.  Dr Trammell addressed.    Pt says that his foot is worsening.  He noted some progression of bruising this morning.  Now, he reports that the foot is bleeding through his socks.  And, the pain is not under control.    Advised ED for re-eval of foot cellulitis.    Pt agrees.  Mariely Barkley RN

## 2017-12-15 NOTE — TELEPHONE ENCOUNTER
"Routed to Dr Roche.  Pt saw Graciela Soto yesterday.    Please advise.  Thank you!    Pt was seen yesterday for right foot trauma that occurred one week ago.  The injury progressed into a cellulitis.  Pt was prescribed Keflex.  He has had two doses so far and there is less redness today.  However the foot remains very painful!  He is using aspirin like \"tic-tacs!\"  Pt asks for pain medication?  He used Vicodin after his knee surgery.  Also, he asks for crutches?  I have cued up the order for crutches too.    Mariely Barkley RN      "

## 2017-12-15 NOTE — LETTER
[facility]  [address] [address]  Phone: [phone]  Fax: [phone]                To whom it may concern:          Latrell Al is to be excused from work for medical reasons from 12/15/19 to 12/28/17.            Sincerely,          Aren Real MD

## 2017-12-15 NOTE — TELEPHONE ENCOUNTER
Patient states that he has never gotten any painkillers for his feet and would like some crutches. Please contact patient at [phone].    Outreach ,  Jessika Javier

## 2017-12-15 NOTE — IP AVS SNAPSHOT
MRN:[id number]                      After Visit Summary   12/15/2017    Latrell Al    MRN: [id number]           Thank you!     Thank you for choosing [facility] for your care. Our goal is always to provide you with excellent care. Hearing back from our patients is one way we can continue to improve our services. Please take a few minutes to complete the written survey that you may receive in the mail after you visit with us. Thank you!        Patient Information     Date Of Birth          1965        Designated Caregiver       Most Recent Value    Caregiver    Will someone help with your care after discharge? yes    Name of designated caregiver Aravind    Phone number of caregiver [phone]    Caregiver address [address] [address]      About your hospital stay     You were admitted on:  December 15, 2017 You last received care in the:  [facility]    You were discharged on:  December 20, 2017       Who to Call     For medical emergencies, please call 911.  For non-urgent questions about your medical care, please call your primary care provider or clinic, [phone]          Attending Provider     Provider Specialty    Gilbert Michele MD Emergency Medicine    Jolene Pena MD Internal Medicine    Brennan Pena MD Internal Medicine    Addie, Aren VINSON MD Family Practice       Primary Care Provider Office Phone # Fax #    Gerald Roche [phone]      Your next 10 appointments already scheduled     Dec 28, 2017  3:20 PM CST   Return Visit with Jim Pascual DPM   [facility] ([facility])    [address]  [address]  [place] [address]   [phone]              Further instructions from your care team       TREATMENT  PLAN     Right 4th toe webspace wound: Start 1/4 inch Iodoform gauze wick into area after irrigation/cleaning  Orders Written as follows:  1.) Cleanse  "webspace/periwound with Microklenz on gauze.  Apply pressure to purple area over dorsal base of 4th toe to express any retained creamy drainage. Direct Microklenz on \"stream\" setting at wound and irrigate wound. Dry well  2.)  Cut about 2cm piece of 1/4 inch Iodoform gauze packing strip and gently tuck into wound tunnel using wooden end of sterile cotton tipped applicator, (is only 1cm deep and directed straight in and slightly toward dorsal toe purple area, do not try to pack tightly - just tuck in once)  3.) Unfold 2 gauze 2x2's and tuck between toe the long way for absorption  4.) Apply clotrimazole to any fungal appearing rash on toes/foot, (currently 1st toe and webspace of 1st, 2nd toes)  5.) secure dressings with rolled gauze using some to cover 1st toe if continues to be weepy/open.  Change daily.    Spouse will need teaching on cares.  If they do not feel they will be able to tuck wick of 1/4 gauze into wound then direct them to do all other steps including irrigation and pressure over toe to express drainage.    Pt to follow-up in clinic with Dr. Pascual next week as scheduled    Pending Results     No orders found from 12/13/2017 to 12/16/2017.            Statement of Approval     Ordered          12/20/17 8558  I have reviewed and agree with all the recommendations and orders detailed in this document.  EFFECTIVE NOW     Approved and electronically signed by:  Aren Real MD             Admission Information     Date & Time Provider Department Dept. Phone    12/15/2017 Aren Real MD [facility] [phone]      Your Vitals Were     Blood Pressure Pulse Temperature Respirations Height Weight    123/78 (BP Location: Right arm) 62 98.9  F (37.2  C) (Oral) 18 1.753 m (5' 9\") 104.1 kg (229 lb 8 oz)    Pulse Oximetry BMI (Body Mass Index)                97% 33.89 kg/m2          MyChart Information     [employer] gives you secure access to your electronic health record. If you see a " primary care provider, you can also send messages to your care team and make appointments. If you have questions, please call your primary care clinic.  If you do not have a primary care provider, please call [phone] and they will assist you.        Care EveryWhere ID     This is your Care EveryWhere ID. This could be used by other organizations to access your [facility] medical records  [id number]        Equal Access to Services     NATHAN HUGHES : Hadii stella aguero hadasho Sotheresaali, waaxda luqadaha, qaybta kaalmada adeegyada, waxay karen cortesfrancesdenita flor. So [facility] [phone].    ATENCIÓN: Si forrest chaudhari, tiene a adam disposición servicios gratuitos de asistencia lingüística. Llame al [phone].    We comply with applicable federal civil rights laws and [place] laws. We do not discriminate on the basis of race, color, national origin, age, disability, sex, sexual orientation, or gender identity.               Review of your medicines      START taking        Dose / Directions    Acidophilus Lactobacillus Caps   Notes to Patient:  Not given, resume        Dose:  1 tablet   Take 1 tablet by mouth 3 times daily (with meals) for 10 days   Quantity:  30 capsule   Refills:  0       amoxicillin-clavulanate 875-125 MG per tablet   Commonly known as:  AUGMENTIN   Notes to Patient:  12/20/17        Dose:  1 tablet   Take 1 tablet by mouth 2 times daily   Quantity:  20 tablet   Refills:  0       clotrimazole 1 % cream   Commonly known as:  LOTRIMIN   Used for:  Tinea pedis of both feet        Apply topically 2 times daily Dispense 1 tube   Quantity:  30 g   Refills:  1       docusate sodium 100 MG capsule   Commonly known as:  COLACE   Used for:  Laceration of fifth toe of right foot, initial encounter        Dose:  100 mg   Take 1 capsule (100 mg) by mouth 2 times daily While taking oxycodone   Quantity:  60 capsule   Refills:  0       oxyCODONE IR 5 MG tablet   Commonly known as:  ROXICODONE   Used for:   Laceration of fifth toe of right foot, initial encounter        Dose:  5-10 mg   Take 1-2 tablets (5-10 mg) by mouth every 4 hours as needed for moderate to severe pain   Quantity:  50 tablet   Refills:  0         CONTINUE these medicines which may have CHANGED, or have new prescriptions. If we are uncertain of the size of tablets/capsules you have at home, strength may be listed as something that might have changed.        Dose / Directions    CIALIS 10 MG tablet   This may have changed:    - medication strength  - reasons to take this  - additional instructions   Used for:  Erectile dysfunction, unspecified erectile dysfunction type   Generic drug:  tadalafil        Dose:  10-20 mg   Take 1-2 tablets (10-20 mg) by mouth daily as needed Hold on file until needed - Do Not take at same time as nitroglycerin   Refills:  0       nitroGLYcerin 0.4 MG sublingual tablet   Commonly known as:  NITROSTAT   This may have changed:  additional instructions   Used for:  Cardiovascular disease        Dose:  0.4 mg   Place 1 tablet (0.4 mg) under the tongue every 5 minutes as needed up to 3 tablets per episode Do  Not take if taking Cialis   Quantity:  25 tablet   Refills:  3       * order for DME   This may have changed:  Another medication with the same name was added. Make sure you understand how and when to take each.   Used for:  Cellulitis of right lower extremity        Equipment being ordered: Crutches, 1 pair   Quantity:  1 Units   Refills:  0       * order for DME   This may have changed:  You were already taking a medication with the same name, and this prescription was added. Make sure you understand how and when to take each.        Equipment being ordered: knee scooter   Quantity:  1 Device   Refills:  0       * order for DME   This may have changed:  You were already taking a medication with the same name, and this prescription was added. Make sure you understand how and when to take each.   Used for:  Laceration of  lesser toe of right foot without foreign body present or damage to nail, initial encounter        Equipment being ordered:crutches   Quantity:  1 Device   Refills:  0       * Notice:  This list has 3 medication(s) that are the same as other medications prescribed for you. Read the directions carefully, and ask your doctor or other care provider to review them with you.      CONTINUE these medicines which have NOT CHANGED        Dose / Directions    aspirin 81 MG tablet        ONE DAILY   Refills:  0       atenolol 50 MG tablet   Commonly known as:  TENORMIN   Used for:  Cardiovascular disease   Notes to Patient:  12/20/17        Dose:  50 mg   Take 1 tablet (50 mg) by mouth daily   Quantity:  90 tablet   Refills:  3       IBUPROFEN PO   Notes to Patient:  Not given while hospitalized, resume as needed        Dose:  600 mg   Take 600 mg by mouth every 2 hours Just for foot pain   Refills:  0       lisinopril 10 MG tablet   Commonly known as:  PRINIVIL/ZESTRIL   Used for:  Cardiovascular disease        Dose:  10 mg   Take 1 tablet (10 mg) by mouth daily   Quantity:  90 tablet   Refills:  3       omeprazole 20 MG tablet   Commonly known as:  priLOSEC OTC   Used for:  Gastroesophageal reflux disease without esophagitis        Dose:  20 mg   Take 1 tablet (20 mg) by mouth daily   Quantity:  90 tablet   Refills:  3       simvastatin 40 MG tablet   Commonly known as:  ZOCOR   Used for:  Hyperlipidemia LDL goal <70   Notes to Patient:  Not given, resume        Dose:  40 mg   Take 1 tablet (40 mg) by mouth At Bedtime at bedtime.   Quantity:  90 tablet   Refills:  3         STOP taking     cephALEXin 500 MG capsule   Commonly known as:  KEFLEX                Where to get your medicines      These medications were sent to [facility] [place], [place] - [address]  [address] [address]     Phone:  [phone]     Acidophilus Lactobacillus Caps    amoxicillin-clavulanate 875-125 MG per tablet     clotrimazole 1 % cream    docusate sodium 100 MG capsule         Some of these will need a paper prescription and others can be bought over the counter. Ask your nurse if you have questions.     Bring a paper prescription for each of these medications     order for DME    order for DME    oxyCODONE IR 5 MG tablet               ANTIBIOTIC INSTRUCTION     You've Been Prescribed an Antibiotic - Now What?  Your healthcare team thinks that you or your loved one might have an infection. Some infections can be treated with antibiotics, which are powerful, life-saving drugs. Like all medications, antibiotics have side effects and should only be used when necessary. There are some important things you should know about your antibiotic treatment.      Your healthcare team may run tests before you start taking an antibiotic.    Your team may take samples (e.g., from your blood, urine or other areas) to run tests to look for bacteria. These test can be important to determine if you need an antibiotic at all and, if you do, which antibiotic will work best.      Within a few days, your healthcare team might change or even stop your antibiotic.    Your team may start you on an antibiotic while they are working to find out what is making you sick.    Your team might change your antibiotic because test results show that a different antibiotic would be better to treat your infection.    In some cases, once your team has more information, they learn that you do not need an antibiotic at all. They may find out that you don't have an infection, or that the antibiotic you're taking won't work against your infection. For example, an infection caused by a virus can't be treated with antibiotics. Staying on an antibiotic when you don't need it is more likely to be harmful than helpful.      You may experience side effects from your antibiotic.    Like all medications, antibiotics have side effects. Some of these can be serious.    Let  you healthcare team know if you have any known allergies when you are admitted to the hospital.    One significant side effect of nearly all antibiotics is the risk of severe and sometimes deadly diarrhea caused by Clostridium difficile (C. Difficile). This occurs when a person takes antibiotics because some good germs are destroyed. Antibiotic use allows C. diificile to take over, putting patients at high risk for this serious infection.    As a patient or caregiver, it is important to understand your or your loved one's antibiotic treatment. It is especially important for caregivers to speak up when patients can't speak for themselves. Here are some important questions to ask your healthcare team.    What infection is this antibiotic treating and how do you know I have that infection?    What side effects might occur from this antibiotic?    How long will I need to take this antibiotic?    Is it safe to take this antibiotic with other medications or supplements (e.g., vitamins) that I am taking?     Are there any special directions I need to know about taking this antibiotic? For example, should I take it with food?    How will I be monitored to know whether my infection is responding to the antibiotic?    What tests may help to make sure the right antibiotic is prescribed for me?      Information provided by:  www.cdc.gov/getsmart  U.S. Department of Health and Human Services  Centers for disease Control and Prevention  National Center for Emerging and Zoonotic Infectious Diseases  Division of Healthcare Quality Promotion         Protect others around you: Learn how to safely use, store and throw away your medicines at www.disposemymeds.org.             Medication List: This is a list of all your medications and when to take them. Check marks below indicate your daily home schedule. Keep this list as a reference.      Medications           Morning Afternoon Evening Bedtime As Needed    Acidophilus Lactobacillus  Caps   Take 1 tablet by mouth 3 times daily (with meals) for 10 days   Notes to Patient:  Not given, resume                                amoxicillin-clavulanate 875-125 MG per tablet   Commonly known as:  AUGMENTIN   Take 1 tablet by mouth 2 times daily   Notes to Patient:  12/20/17                                      aspirin 81 MG tablet   ONE DAILY   Last time this was given:  12/20/17 8:30 AM   Next Dose Due:  12/21/17                                   atenolol 50 MG tablet   Commonly known as:  TENORMIN   Take 1 tablet (50 mg) by mouth daily   Last time this was given:  12.5 mg on 12/19/2017  4:34 PM   Notes to Patient:  12/20/17                                   CIALIS 10 MG tablet   Take 1-2 tablets (10-20 mg) by mouth daily as needed Hold on file until needed - Do Not take at same time as nitroglycerin   Generic drug:  tadalafil                                clotrimazole 1 % cream   Commonly known as:  LOTRIMIN   Apply topically 2 times daily Dispense 1 tube   Last time this was given:  12/20/2017 10:02 AM                                      docusate sodium 100 MG capsule   Commonly known as:  COLACE   Take 1 capsule (100 mg) by mouth 2 times daily While taking oxycodone   Last time this was given:  100 mg on 12/20/2017  8:27 AM   Next Dose Due:  12/20/17                                      IBUPROFEN PO   Take 600 mg by mouth every 2 hours Just for foot pain   Notes to Patient:  Not given while hospitalized, resume as needed                                   lisinopril 10 MG tablet   Commonly known as:  PRINIVIL/ZESTRIL   Take 1 tablet (10 mg) by mouth daily   Last time this was given:  10 mg on 12/19/2017  4:34 PM   Next Dose Due:  12/20/17                                   nitroGLYcerin 0.4 MG sublingual tablet   Commonly known as:  NITROSTAT   Place 1 tablet (0.4 mg) under the tongue every 5 minutes as needed up to 3 tablets per episode Do  Not take if taking Cialis                                    omeprazole 20 MG tablet   Commonly known as:  priLOSEC OTC   Take 1 tablet (20 mg) by mouth daily   Last time this was given:  12/20/17  8:30 AM   Next Dose Due:  12/21/17                                * order for DME   Equipment being ordered: Gwen, 1 pair                                * order for DME   Equipment being ordered: knee scooter                                * order for DME   Equipment being ordered:gwen                                oxyCODONE IR 5 MG tablet   Commonly known as:  ROXICODONE   Take 1-2 tablets (5-10 mg) by mouth every 4 hours as needed for moderate to severe pain   Last time this was given:  10 mg on 12/20/2017  4:05 PM                                   simvastatin 40 MG tablet   Commonly known as:  ZOCOR   Take 1 tablet (40 mg) by mouth At Bedtime at bedtime.   Notes to Patient:  Not given, resume                                   * Notice:  This list has 3 medication(s) that are the same as other medications prescribed for you. Read the directions carefully, and ask your doctor or other care provider to review them with you.

## 2017-12-16 PROBLEM — L03.115 CELLULITIS OF FOOT, RIGHT: Status: ACTIVE | Noted: 2017-12-16

## 2017-12-16 LAB
ERYTHROCYTE [DISTWIDTH] IN BLOOD BY AUTOMATED COUNT: 14.2 % (ref 10–15)
HCT VFR BLD AUTO: 41.2 % (ref 40–53)
HGB BLD-MCNC: 13.5 G/DL (ref 13.3–17.7)
MCH RBC QN AUTO: 29.5 PG (ref 26.5–33)
MCHC RBC AUTO-ENTMCNC: 32.8 G/DL (ref 31.5–36.5)
MCV RBC AUTO: 90 FL (ref 78–100)
MRSA DNA SPEC QL NAA+PROBE: NEGATIVE
PLATELET # BLD AUTO: 194 10E9/L (ref 150–450)
RBC # BLD AUTO: 4.58 10E12/L (ref 4.4–5.9)
SPECIMEN SOURCE: NORMAL
WBC # BLD AUTO: 11.1 10E9/L (ref 4–11)

## 2017-12-16 PROCEDURE — 96367 TX/PROPH/DG ADDL SEQ IV INF: CPT

## 2017-12-16 PROCEDURE — 25000132 ZZH RX MED GY IP 250 OP 250 PS 637: Performed by: EMERGENCY MEDICINE

## 2017-12-16 PROCEDURE — 85027 COMPLETE CBC AUTOMATED: CPT | Performed by: INTERNAL MEDICINE

## 2017-12-16 PROCEDURE — 25000128 H RX IP 250 OP 636: Performed by: EMERGENCY MEDICINE

## 2017-12-16 PROCEDURE — 99232 SBSQ HOSP IP/OBS MODERATE 35: CPT | Performed by: INTERNAL MEDICINE

## 2017-12-16 PROCEDURE — 36415 COLL VENOUS BLD VENIPUNCTURE: CPT | Performed by: INTERNAL MEDICINE

## 2017-12-16 PROCEDURE — 12000000 ZZH R&B MED SURG/OB

## 2017-12-16 PROCEDURE — 96366 THER/PROPH/DIAG IV INF ADDON: CPT

## 2017-12-16 PROCEDURE — 25000132 ZZH RX MED GY IP 250 OP 250 PS 637: Performed by: INTERNAL MEDICINE

## 2017-12-16 PROCEDURE — 96376 TX/PRO/DX INJ SAME DRUG ADON: CPT

## 2017-12-16 PROCEDURE — G0378 HOSPITAL OBSERVATION PER HR: HCPCS

## 2017-12-16 RX ORDER — HYDROCODONE BITARTRATE AND ACETAMINOPHEN 5; 325 MG/1; MG/1
1 TABLET ORAL EVERY 4 HOURS PRN
Status: DISCONTINUED | OUTPATIENT
Start: 2017-12-16 | End: 2017-12-17

## 2017-12-16 RX ADMIN — ACETAMINOPHEN 650 MG: 325 TABLET, FILM COATED ORAL at 19:15

## 2017-12-16 RX ADMIN — HYDROCODONE BITARTRATE AND ACETAMINOPHEN 1 TABLET: 5; 325 TABLET ORAL at 21:26

## 2017-12-16 RX ADMIN — ATENOLOL 50 MG: 50 TABLET ORAL at 17:45

## 2017-12-16 RX ADMIN — Medication 0.5 MG: at 14:57

## 2017-12-16 RX ADMIN — ASPIRIN 81 MG 81 MG: 81 TABLET ORAL at 08:25

## 2017-12-16 RX ADMIN — LISINOPRIL 10 MG: 10 TABLET ORAL at 17:45

## 2017-12-16 RX ADMIN — Medication 0.5 MG: at 12:27

## 2017-12-16 RX ADMIN — CEFAZOLIN SODIUM 1 G: 1 INJECTION, SOLUTION INTRAVENOUS at 04:35

## 2017-12-16 RX ADMIN — Medication 0.5 MG: at 04:35

## 2017-12-16 RX ADMIN — CEFAZOLIN SODIUM 1 G: 1 INJECTION, SOLUTION INTRAVENOUS at 12:27

## 2017-12-16 RX ADMIN — CEFAZOLIN SODIUM 1 G: 1 INJECTION, SOLUTION INTRAVENOUS at 20:25

## 2017-12-16 RX ADMIN — Medication 0.5 MG: at 08:25

## 2017-12-16 RX ADMIN — OMEPRAZOLE 20 MG: 20 CAPSULE, DELAYED RELEASE ORAL at 08:25

## 2017-12-16 ASSESSMENT — PAIN DESCRIPTION - DESCRIPTORS
DESCRIPTORS: SHARP;STABBING
DESCRIPTORS: ACHING
DESCRIPTORS: ACHING;DULL

## 2017-12-16 NOTE — ED NOTES
"MD at bedside  Pt reports \"stubbing\" toe about a week ago   Was seen in clinic yesterday started on antibiotics has had less the 1 full day of treatment today increased pain, swelling redness and now has bleeding from between toes  "

## 2017-12-16 NOTE — ED PROVIDER NOTES
History     Chief Complaint   Patient presents with     Foot Pain     Saw PMD yesterday for digit #4 toe pain, noted laceration on right foot between toes. Now foot is swollen, and bleeding between toes. Currently on oral abx.      HPI  Latrell Al is a 52 year old male who presents to the Emergency Department with concerns of right foot pain, redness and swelling. Patient stubbed his toe on a shoe tray about 1 week ago. He sustained a scrape between his 4th and 5th toes. Several days ago, patient noticed the foot seemed to be getting red and more painful. He was seen yesterday in clinic an started on Keflex. Patient noticed drainage today from the wound and feels the foot is more red, swollen and painful. There is noted swelling and redness over the entire foot to the ankle. Patient has been wearing socks and shoes since the injury occurred. Patient is a smoker 1/2 ppd.     Social History: Here in the ED with his wife by private car. Patient lives in [place], [place].     Problem List:    Patient Active Problem List    Diagnosis Date Noted     ED (erectile dysfunction) 04/22/2013     Priority: Medium     GERD (gastroesophageal reflux disease) 04/25/2011     Priority: Medium     Hyperlipidemia LDL goal <70 10/26/2009     Priority: Medium     Essential hypertension 12/29/2005     Priority: Medium     Problem list name updated by automated process. Provider to review       Cardiovascular disease 10/11/2005     Priority: Medium     Patient had a partial blockage noted at age 39  Problem list name updated by automated process. Provider to review          Past Medical History:    Past Medical History:   Diagnosis Date     Esophageal reflux      TOBACCO ABUSE-CONTINUOUS        Past Surgical History:    Past Surgical History:   Procedure Laterality Date     COLONOSCOPY       COLONOSCOPY N/A 7/13/2015    Procedure: COLONOSCOPY;  Surgeon: Maycol Singer MD;  Location: [facility]     SURGICAL HISTORY OF -   12/10/2004     Right Knee Medial Meniscectomy     SURGICAL HISTORY OF -   9/4/2001    EGD     SURGICAL HISTORY OF -   2/7/1995    Partial medial meniscectomy posteromedial horn meniscus, right knee       Family History:    Family History   Problem Relation Age of Onset     C.A.D. Father      MI 42     CANCER Father      kidney     Hypertension Mother      DIABETES Mother      HEART DISEASE Mother      CABG     CANCER Mother      lung     Breast Cancer Sister      CANCER Maternal Grandmother      skin     Cancer - colorectal No family hx of      Prostate Cancer No family hx of        Social History:  Marital Status:   [2]  Social History   Substance Use Topics     Smoking status: Former Smoker     Packs/day: 0.50     Years: 25.00     Types: Cigarettes, Cigars     Quit date: 1/1/2008     Smokeless tobacco: Never Used     Alcohol use Yes      Comment: 3-6 cans of beer per month        Medications:      IBUPROFEN PO   cephALEXin (KEFLEX) 500 MG capsule   simvastatin (ZOCOR) 40 MG tablet   lisinopril (PRINIVIL/ZESTRIL) 10 MG tablet   atenolol (TENORMIN) 50 MG tablet   omeprazole (PRILOSEC OTC) 20 MG tablet   ASPIRIN 81 MG OR TABS   order for DME   tadalafil (CIALIS) 20 MG tablet   nitroglycerin (NITROSTAT) 0.4 MG sublingual tablet         Review of Systems   Constitutional: Negative.    HENT: Negative.    Eyes: Negative.    Respiratory: Negative.    Gastrointestinal: Negative.    Endocrine: Negative.    Genitourinary: Negative.    Musculoskeletal: Negative.    Skin: Positive for wound (drainage, pain ,swelling overlying  right foot).   Allergic/Immunologic: Negative.    Neurological: Negative.    Hematological: Negative.    Psychiatric/Behavioral: Negative.        Physical Exam   BP: (!) 144/96  Pulse: 83  Temp: 98  F (36.7  C)  Resp: 14  Weight: 106.6 kg (235 lb)  SpO2: 99 %      Physical Exam   Constitutional: He is oriented to person, place, and time. He appears well-developed and well-nourished. No distress.   HENT:   Head:  Normocephalic and atraumatic.   Eyes: Conjunctivae and EOM are normal. Pupils are equal, round, and reactive to light. Right eye exhibits no discharge. Left eye exhibits no discharge. No scleral icterus.   Neck: Normal range of motion. Neck supple. No JVD present. No tracheal deviation present. No thyromegaly present.   Cardiovascular: Normal rate and regular rhythm.  Exam reveals no gallop and no friction rub.    No murmur heard.  Pulmonary/Chest: Effort normal. No stridor.   Abdominal: Soft.   Musculoskeletal:        Left ankle: He exhibits swelling and laceration.        Feet:    Lymphadenopathy:     He has no cervical adenopathy.   Neurological: He is alert and oriented to person, place, and time.   Skin: Skin is warm. He is not diaphoretic. There is erythema.   Psychiatric: He has a normal mood and affect. His behavior is normal. Judgment and thought content normal.       Right foot redness, warmth, swelling,        Purulent drainage from 4th webspace of right foot      ED Course     ED Course     Procedures               Critical Care time:  none               Labs Ordered and Resulted from Time of ED Arrival Up to the Time of Departure from the ED - No data to display    ED Medications:  Medications   ceFAZolin (ANCEF) intermittent infusion 1 g (not administered)         ED Labs and Imaging:  Results for orders placed or performed during the hospital encounter of 12/15/17 (from the past 24 hour(s))   CBC with platelets differential   Result Value Ref Range    WBC 14.7 (H) 4.0 - 11.0 10e9/L    RBC Count 5.04 4.4 - 5.9 10e12/L    Hemoglobin 15.0 13.3 - 17.7 g/dL    Hematocrit 44.6 40.0 - 53.0 %    MCV 89 78 - 100 fl    MCH 29.8 26.5 - 33.0 pg    MCHC 33.6 31.5 - 36.5 g/dL    RDW 14.2 10.0 - 15.0 %    Platelet Count 219 150 - 450 10e9/L    Diff Method Automated Method     % Neutrophils 73.3 %    % Lymphocytes 17.5 %    % Monocytes 6.6 %    % Eosinophils 2.2 %    % Basophils 0.1 %    % Immature Granulocytes 0.3 %     Absolute Neutrophil 10.8 (H) 1.6 - 8.3 10e9/L    Absolute Lymphocytes 2.6 0.8 - 5.3 10e9/L    Absolute Monocytes 1.0 0.0 - 1.3 10e9/L    Absolute Eosinophils 0.3 0.0 - 0.7 10e9/L    Absolute Basophils 0.0 0.0 - 0.2 10e9/L    Abs Immature Granulocytes 0.0 0 - 0.4 10e9/L   Basic metabolic panel   Result Value Ref Range    Sodium 143 133 - 144 mmol/L    Potassium 3.7 3.4 - 5.3 mmol/L    Chloride 107 94 - 109 mmol/L    Carbon Dioxide 27 20 - 32 mmol/L    Anion Gap 9 3 - 14 mmol/L    Glucose 136 (H) 70 - 99 mg/dL    Urea Nitrogen 11 7 - 30 mg/dL    Creatinine 0.81 0.66 - 1.25 mg/dL    GFR Estimate >90 >60 mL/min/1.7m2    GFR Estimate If Black >90 >60 mL/min/1.7m2    Calcium 8.3 (L) 8.5 - 10.1 mg/dL       ED Vitals:  Vitals:    12/15/17 1741   BP: (!) 144/96   Pulse: 83   Resp: 14   Temp: 98  F (36.7  C)   TempSrc: Oral   SpO2: 99%   Weight: 106.6 kg (235 lb)       Previous records reviewed:     Assessments & Plan (with Medical Decision Making)   Clinical Impression: Pleasant 52-year-old male who presented for concern for progressive swelling, redness, warmth and pain overlying his right foot after sustaining a foot injury about a week ago.  He now has concern for secondarily infected webspace laceration.   Patient has a laceration in his fourth webspace overlying his fourth toe and fifth toe on his right foot after hitting his foot against a shoe tray at home.  He was seen in clinic and placed on oral antibiotics.  He was seen by Graciela Soto and  placed on oral Keflex.  He has had one day of antibiotics.  Because of progressive pain and swelling, increasing drainage from the webspace between his toes he arrives in the emergency department for further care.  He reports he is not diabetic.  He is on lisinopril, atenolol, simvastatin, takes omeprazole.  He reports codeine makes him bound up but no other medication allergies.  Please see photo and physical exam section above for details of distribution of swelling warmth  and location of purulent drainage over the fourth webspace of the right foot.      ED Course and Plan:   We had a  discussion about my concern about appropriate healing for his webspace laceration.  I feel the patient has had progressive symptoms because he has been wearing socks and shoes and not allowed his foot and laceration to be open to air to drain and dry.  His foot was soaked in warm water with soap in the webspace was cleaned with gauze cotton-wool, pads were placed in the webspace.  Is given a dose of IV Ancef and I felt to be best to admit him to the hospital for serial exams of his foot, additional wound care including allow his foot to drain appropriately.  It is going be difficult to repair the webspace laceration, will have to heal by secondary intention.  I believe the patient will be better served by admitting to the hospital because his injury was about a week ago. White count is 14.7    I reviewed rationale for admission and hospitalization with Dr SEAMUS Pena- admitting hospitalist at 7.45PM- She agreed to assume care upon admission after reviewing rationale for admission, clinical history and presentation, indication for admission including serial foot exams, IV antibiotics and more aggressive wound care.           Disclaimer: This note consists of symbols derived from keyboarding, dictation and/or voice recognition software. As a result, there may be errors in the script that have gone undetected. Please consider this when interpreting information found in this chart.    I have reviewed the nursing notes.    I have reviewed the findings, diagnosis, plan and need for follow up with the patient.       New Prescriptions    No medications on file       Final diagnoses:   Injury of foot, right, superficial, infected, initial encounter   Laceration of lesser toe of right foot without foreign body present or damage to nail, initial encounter - laceration overlying 4th webspace of the right foot     This  document serves as a record of the services and decisions personally performed and made by Gilbert Michele. The HPI was created on his behalf by Nesha Stacy, a trained medical scribe. The creation of this document is based on the provider's statements to the medical scribe.     Nesha Stacy 7:00 PM December 15, 2017    Provider:   The information in this document created by the medical scribe for me, accurately reflects the services I personally performed and the decisions made by me. I have reviewed and approved this document for accuracy prior to leaving the patient care area. Gilbert Michele, 7:00 PM December 15, 2017    12/15/2017   [facility] EMERGENCY DEPARTMENT     Gilbert Michele MD  12/16/17 0056

## 2017-12-16 NOTE — PROGRESS NOTES
[facility] Medicine Progress Note  Date of Service: 12/16/2017        Assessment & Plan   Latrell Al is a 52 year old male who presented on 12/15/2017 with Right Foot Pain, redness and Swelling    Active Problems:      Right foot infection  Seems to be better on Cefazolin   Non Diabetic therefore will monitor only on Cefazolin    HLD  Continue meds     GERD  Continue meds     HTN  Continue meds     CAD  Stable. Apparently chose conservative approach in '05  -continue aspirin.    DVT Prophylaxis: Low Risk/Ambulatory with no VTE prophylaxis indicated  Code Status: No Order         Plan:   MRI right Foot if not improving - (improving for now)  Consult Podiatry     Disposition: Anticipate discharge Unknown               Interval History   Redness is decreasing in the right Foot  No nausea, Vomiting or Fever    Physical Exam   Temp:  [97.7  F (36.5  C)-98.1  F (36.7  C)] 98.1  F (36.7  C)  Pulse:  [60-83] 60  Heart Rate:  [77] 77  Resp:  [14-18] 18  BP: (104-144)/(72-96) 108/75  SpO2:  [96 %-99 %] 96 %    Weights:   Vitals:    12/15/17 1741 12/15/17 2201   Weight: 106.6 kg (235 lb) 104.1 kg (229 lb 8 oz)    Body mass index is 33.89 kg/(m^2).    Constitutional: Not in any acute distress   EYES: Extra Occular movements are intact, Conjunctiva is clear   NECK: no JVD  CVS: S1 S2  Regular  Respiratory: Clear to auscultation without crepitations or Wheezing  bilaterally  GI: Soft, non tender, Bowel Sounds are Positive   Skin: Warm and dry, No Rashes  CNS: Alert and Oriented x 3   Musculoskeletal: Right Foot redness is much less as comparred to yesterday (from his Cell phone image comparing)            Data     Recent Labs  Lab 12/16/17  0820 12/15/17  1955 12/14/17  0755   WBC 11.1* 14.7* 13.6*   HGB 13.5 15.0 14.9   MCV 90 89 91    219 186   NA  --  143  --    POTASSIUM  --  3.7  --    CHLORIDE  --  107  --    CO2  --  27  --    BUN  --  11  --    CR  --  0.81  --     ANIONGAP  --  9  --    RAFFI  --  8.3*  --    GLC  --  136*  --          Recent Labs  Lab 12/15/17  1955   *        Unresulted Labs Ordered in the Past 30 Days of this Admission     No orders found for last 61 day(s).             Medications        ceFAZolin  1 g Intravenous Q8H     aspirin  81 mg Oral Daily     atenolol  50 mg Oral QPM     lisinopril  10 mg Oral QPM     omeprazole  20 mg Oral Daily     influenza quadrivalent (PF) vacc age 3 yrs and older  0.5 mL Intramuscular Prior to discharge     pneumococcal vaccine  0.5 mL Intramuscular Prior to discharge              Brennan CARLSON.  Hospitalist - Internal Medicine  [facility]   This dictation is generated using a voice recognition software. Please disregard unintended words or phrases.

## 2017-12-16 NOTE — H&P
[facility] History and Physical    Latrell Al MRN# [id number]   Age: 52 year old YOB: 1965     Date of Admission:  12/15/2017    Home clinic: [facility]  Primary care provider: Gerald Roche          Chief Complaint:   Foot pain    History is obtained from the patient, electronic health record, emergency department physician and patient's family          History of Present Illness:   This patient is a 52 year old  male who presents to the Emergency Department with concerns of right foot pain, redness and swelling. Patient stubbed his toe on a shoe tray about 1 week ago. He sustained a scrape between his 4th and 5th toes. Several days ago, patient noticed the foot seemed to be getting red and more painful. He was seen yesterday in clinic an started on Keflex. Patient noticed drainage today from the wound and feels the foot is more red, swollen and painful. There is noted swelling and redness over the entire foot to the ankle. Patient has been wearing socks and shoes since the injury occurred.            Past Medical History:     Patient Active Problem List    Diagnosis Date Noted     ED (erectile dysfunction) 04/22/2013     Priority: Medium     GERD (gastroesophageal reflux disease) 04/25/2011     Priority: Medium     Hyperlipidemia LDL goal <70 10/26/2009     Priority: Medium     Essential hypertension 12/29/2005     Priority: Medium     Problem list name updated by automated process. Provider to review       Cardiovascular disease 10/11/2005     Priority: Medium     Patient had a partial blockage noted at age 39  Problem list name updated by automated process. Provider to review                 Past Surgical History:      Past Surgical History:   Procedure Laterality Date     COLONOSCOPY       COLONOSCOPY N/A 7/13/2015    Procedure: COLONOSCOPY;  Surgeon: Maycol Singer MD;  Location: [facility]     SURGICAL HISTORY OF -   12/10/2004    Right Knee Medial  Meniscectomy     SURGICAL HISTORY OF -   9/4/2001    EGD     SURGICAL HISTORY OF -   2/7/1995    Partial medial meniscectomy posteromedial horn meniscus, right knee             Social History:     Social History     Social History     Marital status:      Spouse name: N/A     Number of children: 0     Years of education: N/A     Occupational History     [employer].     Social History Main Topics     Smoking status: Former Smoker     Packs/day: 0.50     Years: 25.00     Types: Cigarettes, Cigars     Quit date: 1/1/2008     Smokeless tobacco: Never Used     Alcohol use Yes      Comment: 3-6 cans of beer per month     Drug use: No     Sexual activity: Yes     Partners: Female     Other Topics Concern     Parent/Sibling W/ Cabg, Mi Or Angioplasty Before 65f 55m? Yes     mother had angioplasty at 65yo      Service No     Blood Transfusions No     Caffeine Concern Yes     3 cans pop daily     Occupational Exposure Yes     [employer]     Hobby Hazards No     el     Sleep Concern No     Stress Concern Yes     unemployed     Weight Concern Yes     Special Diet No     Back Care No     Exercise No     Bike Helmet Not Asked     n/a     Seat Belt Yes     Self-Exams Yes     Social History Narrative             Family History:     Family History   Problem Relation Age of Onset     C.A.D. Father      MI 42     CANCER Father      kidney     Hypertension Mother      DIABETES Mother      HEART DISEASE Mother      CABG     CANCER Mother      lung     Breast Cancer Sister      CANCER Maternal Grandmother      skin     Cancer - colorectal No family hx of      Prostate Cancer No family hx of              Allergies:     Allergies   Allergen Reactions     Codeine Other (See Comments)     Tylenol #3 Intolerant             Medications:     Prior to Admission medications    Medication Sig Last Dose Taking? Auth Provider   IBUPROFEN PO Take 600 mg by mouth every 2 hours Just for foot pain 12/15/2017 at 1400 Yes Reported,  Patient   cephALEXin (KEFLEX) 500 MG capsule Take 1 capsule (500 mg) by mouth 3 times daily 12/15/2017 at 1200 Yes Graciela Soto APRN CNP   simvastatin (ZOCOR) 40 MG tablet Take 1 tablet (40 mg) by mouth At Bedtime at bedtime. 12/14/2017 at pm Yes Gerald Roche MD   lisinopril (PRINIVIL/ZESTRIL) 10 MG tablet Take 1 tablet (10 mg) by mouth daily 12/14/2017 at pm Yes Gerald Roche MD   atenolol (TENORMIN) 50 MG tablet Take 1 tablet (50 mg) by mouth daily 12/14/2017 at pm Yes Gerald Roche MD   omeprazole (PRILOSEC OTC) 20 MG tablet Take 1 tablet (20 mg) by mouth daily 12/15/2017 at am Yes Gerald Roche MD   ASPIRIN 81 MG OR TABS ONE DAILY 12/15/2017 at am Yes Aren Houtson MD   order for DME Equipment being ordered: Shirlene, Augusto Castellanos MD   tadalafil (CIALIS) 20 MG tablet Take 0.5-1 tablets (10-20 mg) by mouth daily as needed for erectile dysfunction Hold on file until needed Unknown at Unknown time  Gerald Roche MD   nitroglycerin (NITROSTAT) 0.4 MG sublingual tablet Place 1 tablet (0.4 mg) under the tongue every 5 minutes as needed up to 3 tablets per episode  Patient not taking: Reported on 12/14/2017 never used  Gerald Roche MD            Review of Systems:   The Review of Systems is negative in ALL other than noted in the HPI          Physical Exam:   Blood pressure 125/83, pulse 71, temperature 98  F (36.7  C), temperature source Oral, resp. rate 16, weight 106.6 kg (235 lb), SpO2 96 %.  GENERAL APPEARANCE: healthy, alert and no distress  EYES: conjunctiva clear, eyes grossly normal  HENT: external ears and nose normal   NECK: supple, no masses or adenopathy  RESP: lungs clear to auscultation - no rales, rhonchi or wheezes  CV: regular rate and rhythm, normal S1 S2, no S3 or S4 and no murmur, click or rub   ABDOMEN: soft, nontender, no HSM or masses and bowel sounds normal  MS: no clubbing, cyanosis;  Edema R foot  SKIN: erethema R foot. Lac /  drainage between 4/5 toe, fungal nails. fumgan infection L 2nd toe  NEURO: Normal strength and tone, sensory exam grossly normal, mentation intact and speech normal         Data:     Lab Results   Component Value Date    WBC 14.7 (H) 12/15/2017    HGB 15.0 12/15/2017    HCT 44.6 12/15/2017    MCV 89 12/15/2017     12/15/2017     Lab Results   Component Value Date     12/15/2017    CO2 27 12/15/2017     Lab Results   Component Value Date    BUN 11 12/15/2017     No components found for: SEDRATE  No components found for: DDIMER  No results found for: BNP  Lab Results   Component Value Date    TSH 2.80 02/20/2009     Lab Results   Component Value Date    TROPONIN <0.02 10/10/2005     UA RESULTS:  No results for input(s): COLOR, APPEARANCE, URINEGLC, URINEBILI, URINEKETONE, SG, UBLD, URINEPH, PROTEIN, UROBILINOGEN, NITRITE, LEUKEST, RBCU, WBCU in the last [id number] hours.  Liver Function Studies -   Recent Labs   Lab Test  04/06/12   1123  01/08/11   1139   AST   --   24   ALT  19   --        RADIOLOGY:  Xray R foot 12/14-IMPRESSION: Bones are normally aligned. No acute fracture     A/P  R FOOT CELLULITIS/ LAC WITH DRAINAGE  Started on iv ancef in ED. Does have leucocytosis.  -continue antbx/ wound care to see if possible.    HLD  Continue meds    GERD  Continue meds    HTN  Continue meds    CAD  Stable. Apparently chose conservative approach in '05  -continue aspirin.    DVT PROF-AMBULATE    DISPO  Home in AM if improving .     Jolene Pena MD  [phone]

## 2017-12-16 NOTE — PLAN OF CARE
Problem: Infection, Risk/Actual (Adult)  Goal: Identify Related Risk Factors and Signs and Symptoms  Related risk factors and signs and symptoms are identified upon initiation of Human Response Clinical Practice Guideline (CPG).   Outcome: Improving  Per patient and per this RN's assessment much improved from admission,  Area to right foot is notable less swollen and less red.  Did medicate for pain only X 1 through the night.patient states is less painful then even 12 hours ago.  Continue with plan of care.

## 2017-12-16 NOTE — PROGRESS NOTES
[place] [facility] [place]    Patient admitted to room 1010 at approximately 2200 via wheel chair from emergency room. Patient was accompanied by spouse and nurse.     Verbal SBAR report received from Jessica NGUYEN prior to patient arrival.     Patient ambulated to bed with stand-by assist. Patient alert and oriented X 3. Pain is controlled with current analgesics.  Medication(s) being used: narcotic analgesics including hydromorphone (Dilaudid). 0-10 Pain Scale: 8. Admission vital signs: Blood pressure 136/82, pulse 71, temperature 98  F (36.7  C), temperature source Oral, resp. rate 16, height 1.753 m (5' 9\"), weight 104.1 kg (229 lb 8 oz), SpO2 97 %. Patient was oriented to plan of care, call light, bed controls, tv, telephone, bathroom and visiting hours.     The following safety risks were identified during admission: none. Will continue to monitor and document as needed.      Edmundo Rocha RN    "

## 2017-12-17 PROCEDURE — 25000132 ZZH RX MED GY IP 250 OP 250 PS 637: Performed by: PHYSICIAN ASSISTANT

## 2017-12-17 PROCEDURE — 25000128 H RX IP 250 OP 636: Performed by: EMERGENCY MEDICINE

## 2017-12-17 PROCEDURE — 99232 SBSQ HOSP IP/OBS MODERATE 35: CPT | Performed by: INTERNAL MEDICINE

## 2017-12-17 PROCEDURE — 25000132 ZZH RX MED GY IP 250 OP 250 PS 637: Performed by: INTERNAL MEDICINE

## 2017-12-17 PROCEDURE — 12000000 ZZH R&B MED SURG/OB

## 2017-12-17 RX ORDER — OXYCODONE HYDROCHLORIDE 5 MG/1
5-10 TABLET ORAL EVERY 4 HOURS PRN
Status: DISCONTINUED | OUTPATIENT
Start: 2017-12-17 | End: 2017-12-20 | Stop reason: HOSPADM

## 2017-12-17 RX ORDER — ACETAMINOPHEN 325 MG/1
975 TABLET ORAL EVERY 8 HOURS
Status: DISCONTINUED | OUTPATIENT
Start: 2017-12-17 | End: 2017-12-20 | Stop reason: HOSPADM

## 2017-12-17 RX ADMIN — CEFAZOLIN SODIUM 1 G: 1 INJECTION, SOLUTION INTRAVENOUS at 03:56

## 2017-12-17 RX ADMIN — HYDROCODONE BITARTRATE AND ACETAMINOPHEN 1 TABLET: 5; 325 TABLET ORAL at 12:20

## 2017-12-17 RX ADMIN — CEFAZOLIN SODIUM 1 G: 1 INJECTION, SOLUTION INTRAVENOUS at 20:24

## 2017-12-17 RX ADMIN — HYDROCODONE BITARTRATE AND ACETAMINOPHEN 1 TABLET: 5; 325 TABLET ORAL at 04:06

## 2017-12-17 RX ADMIN — OMEPRAZOLE 20 MG: 20 CAPSULE, DELAYED RELEASE ORAL at 08:15

## 2017-12-17 RX ADMIN — ASPIRIN 81 MG 81 MG: 81 TABLET ORAL at 08:15

## 2017-12-17 RX ADMIN — CEFAZOLIN SODIUM 1 G: 1 INJECTION, SOLUTION INTRAVENOUS at 12:14

## 2017-12-17 RX ADMIN — LISINOPRIL 10 MG: 10 TABLET ORAL at 16:56

## 2017-12-17 RX ADMIN — OXYCODONE HYDROCHLORIDE 10 MG: 5 TABLET ORAL at 20:22

## 2017-12-17 RX ADMIN — HYDROCODONE BITARTRATE AND ACETAMINOPHEN 1 TABLET: 5; 325 TABLET ORAL at 08:15

## 2017-12-17 RX ADMIN — HYDROCODONE BITARTRATE AND ACETAMINOPHEN 1 TABLET: 5; 325 TABLET ORAL at 16:14

## 2017-12-17 RX ADMIN — ACETAMINOPHEN 975 MG: 325 TABLET, FILM COATED ORAL at 20:22

## 2017-12-17 NOTE — PLAN OF CARE
"Problem: Patient Care Overview  Goal: Plan of Care/Patient Progress Review  Patient right foot elevated on pillows this shift. Edema and redness appears to be improved compared to beginning of shift.  Gauze dressing between 4th and 5th toes removed by saturating with NS - small amount of serosang dried drainage noted.   Right foot washed with soap and water and wound between 4th/5th toes flushed gently with normal saline.  New Mepilex dressing placed.    Patient noted to also have red, flaking, cracked skin on 1st great toe.  Patient states this is \"athlete's foot\" he has had prior to admission.     Patient tolerated dressing change/wound care well - denied pain.        "

## 2017-12-17 NOTE — PROGRESS NOTES
[facility] Medicine Progress Note  Date of Service: 12/17/2017        Assessment & Plan   Latrell Al is a 52 year old male who presented on 12/15/2017 with Right Foot Pain, redness and Swelling    Active Problems:      Right foot infection  Seems to be better on Cefazolin than at admit but about the same as yesterday  Non Diabetic therefore will monitor only on Cefazolin    HLD  Continue meds     GERD  Continue meds     HTN  Continue meds     CAD  Stable. Apparently chose conservative approach in '05  -continue aspirin.    DVT Prophylaxis: Low Risk/Ambulatory with no VTE prophylaxis indicated  Code Status: No Order         Plan:   MRI right Foot if not improving   Consult Podiatry - will discuss with Podiatry if MRI is needed as patient is hesitant to get it  CBC  Disposition: Anticipate discharge Unknown               Interval History   Redness is same as yesterday in the right Foot  No nausea, Vomiting or Fever    Physical Exam   Temp:  [96.7  F (35.9  C)-98.2  F (36.8  C)] 96.7  F (35.9  C)  Pulse:  [52-60] 52  Heart Rate:  [51-60] 52  Resp:  [16] 16  BP: (112-134)/(70-86) 126/79  SpO2:  [94 %-98 %] 97 %    Weights:   Vitals:    12/15/17 1741 12/15/17 2201   Weight: 106.6 kg (235 lb) 104.1 kg (229 lb 8 oz)    Body mass index is 33.89 kg/(m^2).    Constitutional: Not in any acute distress   EYES: Extra Occular movements are intact, Conjunctiva is clear   NECK: no JVD  CVS: S1 S2  Regular  Respiratory: Clear to auscultation without crepitations or Wheezing  bilaterally  GI: Soft, non tender, Bowel Sounds are Positive   Skin: Warm and dry, No Rashes  CNS: Alert and Oriented x 3   Musculoskeletal: Right Foot redness is much less as comparred to yesterday (from his Cell phone image comparing)            Data     Recent Labs  Lab 12/16/17  0820 12/15/17  1955 12/14/17  0755   WBC 11.1* 14.7* 13.6*   HGB 13.5 15.0 14.9   MCV 90 89 91    219 186   NA  --  143  --     POTASSIUM  --  3.7  --    CHLORIDE  --  107  --    CO2  --  27  --    BUN  --  11  --    CR  --  0.81  --    ANIONGAP  --  9  --    RAFFI  --  8.3*  --    GLC  --  136*  --          Recent Labs  Lab 12/15/17  1955   *        Unresulted Labs Ordered in the Past 30 Days of this Admission     No orders found from 10/16/2017 to 12/16/2017.             Medications        ceFAZolin  1 g Intravenous Q8H     aspirin  81 mg Oral Daily     atenolol  50 mg Oral QPM     lisinopril  10 mg Oral QPM     omeprazole  20 mg Oral Daily     influenza quadrivalent (PF) vacc age 3 yrs and older  0.5 mL Intramuscular Prior to discharge     pneumococcal vaccine  0.5 mL Intramuscular Prior to discharge              Brennan CARLSON.  Hospitalist - Internal Medicine  [facility]   This dictation is generated using a voice recognition software. Please disregard unintended words or phrases.

## 2017-12-17 NOTE — PROGRESS NOTES
Care Transitions:  Patient has  [place] to Observation  order. Patient has been given 12/16/17 Patient Bill of Rights, Observation brochure and  What does Observation mean to me  forms.  Patient has been given the opportunity to ask questions about observation status and their plan of care.   Abby Vincent RN, Care Coordinator [phone]

## 2017-12-17 NOTE — PLAN OF CARE
Problem: Infection, Risk/Actual (Adult)  Goal: Identify Related Risk Factors and Signs and Symptoms  Related risk factors and signs and symptoms are identified upon initiation of Human Response Clinical Practice Guideline (CPG).   Outcome: No Change  Right foot red, warm and swollen.  Abrasion between fourth and fifth toe with scant amount of bloody drainage, dry guaze in place per patient request.  Up to bathroom x1 overnight and complained of increased pain.  Norco given per PRN order.  VSS and afebrile.

## 2017-12-18 ENCOUNTER — APPOINTMENT (OUTPATIENT)
Dept: MRI IMAGING | Facility: CLINIC | Age: 52
DRG: 603 | End: 2017-12-18
Attending: FAMILY MEDICINE
Payer: COMMERCIAL

## 2017-12-18 LAB
CRP SERPL-MCNC: 35.1 MG/L (ref 0–8)
ERYTHROCYTE [DISTWIDTH] IN BLOOD BY AUTOMATED COUNT: 13.7 % (ref 10–15)
HCT VFR BLD AUTO: 43.2 % (ref 40–53)
HGB BLD-MCNC: 14 G/DL (ref 13.3–17.7)
MCH RBC QN AUTO: 28.9 PG (ref 26.5–33)
MCHC RBC AUTO-ENTMCNC: 32.4 G/DL (ref 31.5–36.5)
MCV RBC AUTO: 89 FL (ref 78–100)
PLATELET # BLD AUTO: 214 10E9/L (ref 150–450)
RBC # BLD AUTO: 4.85 10E12/L (ref 4.4–5.9)
WBC # BLD AUTO: 7 10E9/L (ref 4–11)

## 2017-12-18 PROCEDURE — 99232 SBSQ HOSP IP/OBS MODERATE 35: CPT | Performed by: FAMILY MEDICINE

## 2017-12-18 PROCEDURE — 25000132 ZZH RX MED GY IP 250 OP 250 PS 637: Performed by: PHYSICIAN ASSISTANT

## 2017-12-18 PROCEDURE — 12000000 ZZH R&B MED SURG/OB

## 2017-12-18 PROCEDURE — 85027 COMPLETE CBC AUTOMATED: CPT | Performed by: INTERNAL MEDICINE

## 2017-12-18 PROCEDURE — 90732 PPSV23 VACC 2 YRS+ SUBQ/IM: CPT | Performed by: INTERNAL MEDICINE

## 2017-12-18 PROCEDURE — 25000132 ZZH RX MED GY IP 250 OP 250 PS 637: Performed by: FAMILY MEDICINE

## 2017-12-18 PROCEDURE — 90686 IIV4 VACC NO PRSV 0.5 ML IM: CPT | Performed by: INTERNAL MEDICINE

## 2017-12-18 PROCEDURE — 25000132 ZZH RX MED GY IP 250 OP 250 PS 637: Performed by: INTERNAL MEDICINE

## 2017-12-18 PROCEDURE — 86140 C-REACTIVE PROTEIN: CPT | Performed by: INTERNAL MEDICINE

## 2017-12-18 PROCEDURE — 25000128 H RX IP 250 OP 636: Performed by: INTERNAL MEDICINE

## 2017-12-18 PROCEDURE — 36415 COLL VENOUS BLD VENIPUNCTURE: CPT | Performed by: INTERNAL MEDICINE

## 2017-12-18 PROCEDURE — 25000128 H RX IP 250 OP 636: Performed by: FAMILY MEDICINE

## 2017-12-18 PROCEDURE — 25000128 H RX IP 250 OP 636: Performed by: EMERGENCY MEDICINE

## 2017-12-18 PROCEDURE — A9585 GADOBUTROL INJECTION: HCPCS | Performed by: FAMILY MEDICINE

## 2017-12-18 PROCEDURE — 73720 MRI LWR EXTREMITY W/O&W/DYE: CPT | Mod: RT

## 2017-12-18 RX ORDER — GADOBUTROL 604.72 MG/ML
10 INJECTION INTRAVENOUS ONCE
Status: COMPLETED | OUTPATIENT
Start: 2017-12-18 | End: 2017-12-18

## 2017-12-18 RX ORDER — DOCUSATE SODIUM 100 MG/1
100 CAPSULE, LIQUID FILLED ORAL 2 TIMES DAILY
Status: DISCONTINUED | OUTPATIENT
Start: 2017-12-18 | End: 2017-12-20 | Stop reason: HOSPADM

## 2017-12-18 RX ORDER — ATENOLOL 25 MG/1
25 TABLET ORAL EVERY EVENING
Status: DISCONTINUED | OUTPATIENT
Start: 2017-12-18 | End: 2017-12-19

## 2017-12-18 RX ADMIN — ACETAMINOPHEN 975 MG: 325 TABLET, FILM COATED ORAL at 19:39

## 2017-12-18 RX ADMIN — CEFAZOLIN SODIUM 1 G: 1 INJECTION, SOLUTION INTRAVENOUS at 12:19

## 2017-12-18 RX ADMIN — OXYCODONE HYDROCHLORIDE 10 MG: 5 TABLET ORAL at 10:27

## 2017-12-18 RX ADMIN — ASPIRIN 81 MG 81 MG: 81 TABLET ORAL at 08:24

## 2017-12-18 RX ADMIN — CEFAZOLIN SODIUM 1 G: 1 INJECTION, SOLUTION INTRAVENOUS at 19:41

## 2017-12-18 RX ADMIN — DOCUSATE SODIUM 100 MG: 100 CAPSULE, LIQUID FILLED ORAL at 19:39

## 2017-12-18 RX ADMIN — DOCUSATE SODIUM 100 MG: 100 CAPSULE, LIQUID FILLED ORAL at 16:43

## 2017-12-18 RX ADMIN — CEFAZOLIN SODIUM 1 G: 1 INJECTION, SOLUTION INTRAVENOUS at 04:05

## 2017-12-18 RX ADMIN — INFLUENZA A VIRUS A/MICHIGAN/45/2015 X-275 (H1N1) ANTIGEN (FORMALDEHYDE INACTIVATED), INFLUENZA A VIRUS A/HONG KONG/4801/2014 X-263B (H3N2) ANTIGEN (FORMALDEHYDE INACTIVATED), INFLUENZA B VIRUS B/PHUKET/3073/2013 ANTIGEN (FORMALDEHYDE INACTIVATED), AND INFLUENZA B VIRUS B/BRISBANE/60/2008 ANTIGEN (FORMALDEHYDE INACTIVATED) 0.5 ML: 15; 15; 15; 15 INJECTION, SUSPENSION INTRAMUSCULAR at 11:27

## 2017-12-18 RX ADMIN — LISINOPRIL 10 MG: 10 TABLET ORAL at 16:43

## 2017-12-18 RX ADMIN — OXYCODONE HYDROCHLORIDE 10 MG: 5 TABLET ORAL at 03:07

## 2017-12-18 RX ADMIN — OMEPRAZOLE 20 MG: 20 CAPSULE, DELAYED RELEASE ORAL at 08:24

## 2017-12-18 RX ADMIN — ACETAMINOPHEN 975 MG: 325 TABLET, FILM COATED ORAL at 03:07

## 2017-12-18 RX ADMIN — ACETAMINOPHEN 975 MG: 325 TABLET, FILM COATED ORAL at 11:20

## 2017-12-18 RX ADMIN — OXYCODONE HYDROCHLORIDE 10 MG: 5 TABLET ORAL at 16:43

## 2017-12-18 RX ADMIN — GADOBUTROL 10 ML: 604.72 INJECTION INTRAVENOUS at 14:48

## 2017-12-18 RX ADMIN — PNEUMOCOCCAL VACCINE POLYVALENT 0.5 ML
25; 25; 25; 25; 25; 25; 25; 25; 25; 25; 25; 25; 25; 25; 25; 25; 25; 25; 25; 25; 25; 25; 25 INJECTION, SOLUTION INTRAMUSCULAR; SUBCUTANEOUS at 11:21

## 2017-12-18 RX ADMIN — ATENOLOL 25 MG: 25 TABLET ORAL at 17:37

## 2017-12-18 NOTE — PROGRESS NOTES
[facility]    Hospital Medicine   Cross Cover Note  Date of Service: 12/17/2017     I was called due to worsening pain to foot, not covered by Norco Q4 hours.  Per RN, foot appears less erythematous.      Plan:  - transition to scheduled Q8 acetaminophen  - PRN IR oxycodone 5-10mg Q4 hours  - d/c PRN Norco, acetaminophen      Miryam eHrmosillo PA-C

## 2017-12-18 NOTE — PROGRESS NOTES
[facility]           Assessment and Plan:        Latrell Al is a 52 year old male who presented on 12/15/2017 with Right Foot Pain, redness and Swelling     Active Problems:      Right foot infection  17 -- Seems to be better on Cefazolin than at admit but about the same as yesterday  Non Diabetic therefore will monitor only on Cefazolin  2017 -- improving - MRI negative for osteo or abscess - discussed with podiatry, IV antibiotics until seeing clear improvement, then ok to discharge on orals.        HLD  Continue meds      GERD  Continue meds      HTN  2017 -- heart rate low on atenolol - cut dose in half, follow.        CAD  Stable. Apparently chose conservative approach in   -continue aspirin.     DVT Prophylaxis: Low Risk/Ambulatory with no VTE prophylaxis indicated             Disposition  Improving but slowly, at least 1-2 more days inpatient.              Interval History:   Improving, pain and redness a bit better.  No worsening, no fever or chills.            Review of Systems:    ROS: 10 point ROS neg other than the symptoms noted above in the HPI.             Medications:   Current active medications and PTA medications reviewed, see medication list for details.            Physical Exam:   Vitals were reviewed  Patient Vitals for the past 24 hrs:   BP Temp Temp src Pulse Heart Rate Resp SpO2   17 1640 124/74 97.4  F (36.3  C) Oral 57 - 18 95 %   17 0750 129/80 97.6  F (36.4  C) Oral - 51 16 97 %   17 0318 114/76 97.7  F (36.5  C) Oral - 47 16 98 %   17 2311 123/69 97.5  F (36.4  C) Oral - 58 16 92 %   17 1934 131/81 96.7  F (35.9  C) Oral - 50 16 99 %       Temperatures:  Current - Temp: 97.4  F (36.3  C); Max - Temp  Av.4  F (36.3  C)  Min: 96.7  F (35.9  C)  Max: 97.7  F (36.5  C)  Respiration range: Resp  Av.4  Min: 16  Max: 18  Pulse range: Pulse  Av  Min: 57  Max: 57  Blood pressure range: Systolic  (24hrs), Av , Min:114 , Max:131   ; Diastolic (24hrs), Av, Min:69, Max:81    Pulse oximetry range: SpO2  Av.2 %  Min: 92 %  Max: 99 %  I/O last 3 completed shifts:  In: 780 [P.O.:670; I.V.:110]  Out: -     Intake/Output Summary (Last 24 hours) at 17 1719  Last data filed at 17 1400   Gross per 24 hour   Intake              660 ml   Output                0 ml   Net              660 ml     EXAM:  General: awake and alert, NAD, oriented x 3  Head: normocephalic  Neck: unremarkable, no lymphadenopathy   HEENT: oropharynx pink and moist    Heart: Regular rate and rhythm, no murmurs, rubs, or gallops  Lungs: clear to auscultation bilaterally with good air movement throughout  Abdomen: soft, non-tender, no masses or organomegaly  Extremities: no edema in lower extremities   Skin: erythema improved, but still present, foot still swollen but also improving, no fluctuance, otherwise unremarkable.               Data:     Results for orders placed or performed during the hospital encounter of 12/15/17 (from the past 24 hour(s))   CBC with platelets   Result Value Ref Range    WBC 7.0 4.0 - 11.0 10e9/L    RBC Count 4.85 4.4 - 5.9 10e12/L    Hemoglobin 14.0 13.3 - 17.7 g/dL    Hematocrit 43.2 40.0 - 53.0 %    MCV 89 78 - 100 fl    MCH 28.9 26.5 - 33.0 pg    MCHC 32.4 31.5 - 36.5 g/dL    RDW 13.7 10.0 - 15.0 %    Platelet Count 214 150 - 450 10e9/L   CRP inflammation   Result Value Ref Range    CRP Inflammation 35.1 (H) 0.0 - 8.0 mg/L   MR Foot Right w/o & w Contrast    Narrative    MR FOOT RIGHT WITHOUT AND WITH CONTRAST   2017 3:23 PM     HISTORY:  Question abscess.    DOSE:  10 mL Gadavist.    FINDINGS: Artifact is present in the webspace between the third/fourth  toes, and to lesser degree between the fourth/fifth toes. Soft tissue  edema is noted within the dorsal soft tissues of the forefoot with  enhancement. However, no rim-enhancing fluid collection is  demonstrated to clearly indicate soft  tissue abscess. 0.4 cm cyst is  noted along the dorsal medial aspect of the first metatarsophalangeal  joint, possibly a small ganglion cyst. Small cysts or erosions are  also noted along the medial eminence of the first metatarsal head.  However, marrow signal within the forefoot is otherwise unremarkable  with no evidence of osteomyelitis. There appears to be a physiologic  amount of fluid in the metatarsophalangeal joints. Proximally,  alignment of the tarsometatarsal joints appears within normal limits.      Impression    IMPRESSION:  1. Metallic artifact in the third/fourth and to lesser degree  fourth/fifth web spaces.  2. Prominent soft tissue edema predominantly dorsally within the  forefoot. MR could not distinguish reactive edema from cellulitis.  3. Small 0.4 cm cyst, likely a soft tissue ganglion cyst along the  dorsal medial aspect of the first metatarsophalangeal joint.  4. No rim-enhancing soft tissue fluid collection within the forefoot  to suggest soft tissue abscess.  5. No forefoot marrow edema to suggest osteomyelitis.           Attestation:  I have reviewed today's vital signs, notes, medications, labs and imaging.  Amount of time performed on this daily note: 25 minutes.     Aren Real MD, MD

## 2017-12-18 NOTE — PLAN OF CARE
"Problem: Patient Care Overview  Goal: Plan of Care/Patient Progress Review  Outcome: No Change  RLE edema and redness improving. Foam dressing clean, dry and intact. RLE elevated in bed. Receiving norco 5/325 mg every 4 hours as needed. Pt reports pain \"almost intolerable\" after last dose at 1614. Pain decreases but medication wears off after a couple of hours. ANGELES Jani updated, see medication changes. Eating and drinking well. Makes needs known. Evening dose of metoprolol held for pulse rate in 50's. Pt normally takes at bedtime. Will pass on to on-coming nurse to re-eval tonight.       "

## 2017-12-19 LAB
ANION GAP SERPL CALCULATED.3IONS-SCNC: 4 MMOL/L (ref 3–14)
BUN SERPL-MCNC: 14 MG/DL (ref 7–30)
CALCIUM SERPL-MCNC: 8.4 MG/DL (ref 8.5–10.1)
CHLORIDE SERPL-SCNC: 102 MMOL/L (ref 94–109)
CO2 SERPL-SCNC: 32 MMOL/L (ref 20–32)
CREAT SERPL-MCNC: 0.86 MG/DL (ref 0.66–1.25)
CRP SERPL-MCNC: 25.8 MG/L (ref 0–8)
ERYTHROCYTE [DISTWIDTH] IN BLOOD BY AUTOMATED COUNT: 13.4 % (ref 10–15)
GFR SERPL CREATININE-BSD FRML MDRD: >90 ML/MIN/1.7M2
GLUCOSE SERPL-MCNC: 88 MG/DL (ref 70–99)
HCT VFR BLD AUTO: 43.2 % (ref 40–53)
HGB BLD-MCNC: 14.3 G/DL (ref 13.3–17.7)
MCH RBC QN AUTO: 29.5 PG (ref 26.5–33)
MCHC RBC AUTO-ENTMCNC: 33.1 G/DL (ref 31.5–36.5)
MCV RBC AUTO: 89 FL (ref 78–100)
PLATELET # BLD AUTO: 206 10E9/L (ref 150–450)
POTASSIUM SERPL-SCNC: 4.2 MMOL/L (ref 3.4–5.3)
RBC # BLD AUTO: 4.85 10E12/L (ref 4.4–5.9)
SODIUM SERPL-SCNC: 138 MMOL/L (ref 133–144)
WBC # BLD AUTO: 7.3 10E9/L (ref 4–11)

## 2017-12-19 PROCEDURE — 25000132 ZZH RX MED GY IP 250 OP 250 PS 637: Performed by: PHYSICIAN ASSISTANT

## 2017-12-19 PROCEDURE — 12000000 ZZH R&B MED SURG/OB

## 2017-12-19 PROCEDURE — 25000128 H RX IP 250 OP 636: Performed by: FAMILY MEDICINE

## 2017-12-19 PROCEDURE — 25000132 ZZH RX MED GY IP 250 OP 250 PS 637: Performed by: FAMILY MEDICINE

## 2017-12-19 PROCEDURE — 85027 COMPLETE CBC AUTOMATED: CPT | Performed by: FAMILY MEDICINE

## 2017-12-19 PROCEDURE — 80048 BASIC METABOLIC PNL TOTAL CA: CPT | Performed by: FAMILY MEDICINE

## 2017-12-19 PROCEDURE — 25000132 ZZH RX MED GY IP 250 OP 250 PS 637: Performed by: INTERNAL MEDICINE

## 2017-12-19 PROCEDURE — 99232 SBSQ HOSP IP/OBS MODERATE 35: CPT | Performed by: FAMILY MEDICINE

## 2017-12-19 PROCEDURE — 86140 C-REACTIVE PROTEIN: CPT | Performed by: FAMILY MEDICINE

## 2017-12-19 PROCEDURE — 99212 OFFICE O/P EST SF 10 MIN: CPT

## 2017-12-19 PROCEDURE — 36415 COLL VENOUS BLD VENIPUNCTURE: CPT | Performed by: FAMILY MEDICINE

## 2017-12-19 PROCEDURE — 25000128 H RX IP 250 OP 636: Performed by: EMERGENCY MEDICINE

## 2017-12-19 RX ORDER — CLOTRIMAZOLE 1 %
CREAM (GRAM) TOPICAL 2 TIMES DAILY
Status: DISCONTINUED | OUTPATIENT
Start: 2017-12-19 | End: 2017-12-20 | Stop reason: HOSPADM

## 2017-12-19 RX ORDER — ATENOLOL 25 MG/1
12.5 TABLET ORAL EVERY EVENING
Status: DISCONTINUED | OUTPATIENT
Start: 2017-12-19 | End: 2017-12-20 | Stop reason: HOSPADM

## 2017-12-19 RX ADMIN — CEFAZOLIN SODIUM 1 G: 1 INJECTION, SOLUTION INTRAVENOUS at 19:38

## 2017-12-19 RX ADMIN — CEFAZOLIN SODIUM 1 G: 1 INJECTION, SOLUTION INTRAVENOUS at 11:52

## 2017-12-19 RX ADMIN — Medication 12.5 MG: at 16:34

## 2017-12-19 RX ADMIN — ACETAMINOPHEN 975 MG: 325 TABLET, FILM COATED ORAL at 03:16

## 2017-12-19 RX ADMIN — OXYCODONE HYDROCHLORIDE 10 MG: 5 TABLET ORAL at 12:13

## 2017-12-19 RX ADMIN — CLOTRIMAZOLE: 10 CREAM TOPICAL at 19:39

## 2017-12-19 RX ADMIN — DOCUSATE SODIUM 100 MG: 100 CAPSULE, LIQUID FILLED ORAL at 07:57

## 2017-12-19 RX ADMIN — CEFAZOLIN SODIUM 1 G: 1 INJECTION, SOLUTION INTRAVENOUS at 03:18

## 2017-12-19 RX ADMIN — CLOTRIMAZOLE: 10 CREAM TOPICAL at 12:15

## 2017-12-19 RX ADMIN — ACETAMINOPHEN 975 MG: 325 TABLET, FILM COATED ORAL at 19:37

## 2017-12-19 RX ADMIN — ENOXAPARIN SODIUM 40 MG: 40 INJECTION SUBCUTANEOUS at 12:18

## 2017-12-19 RX ADMIN — OMEPRAZOLE 20 MG: 20 CAPSULE, DELAYED RELEASE ORAL at 07:57

## 2017-12-19 RX ADMIN — DOCUSATE SODIUM 100 MG: 100 CAPSULE, LIQUID FILLED ORAL at 19:37

## 2017-12-19 RX ADMIN — ACETAMINOPHEN 975 MG: 325 TABLET, FILM COATED ORAL at 11:09

## 2017-12-19 RX ADMIN — MAGNESIUM HYDROXIDE 30 ML: 400 SUSPENSION ORAL at 11:11

## 2017-12-19 RX ADMIN — OXYCODONE HYDROCHLORIDE 10 MG: 5 TABLET ORAL at 07:57

## 2017-12-19 RX ADMIN — OXYCODONE HYDROCHLORIDE 10 MG: 5 TABLET ORAL at 00:11

## 2017-12-19 RX ADMIN — OXYCODONE HYDROCHLORIDE 10 MG: 5 TABLET ORAL at 16:33

## 2017-12-19 RX ADMIN — LISINOPRIL 10 MG: 10 TABLET ORAL at 16:34

## 2017-12-19 RX ADMIN — ASPIRIN 81 MG 81 MG: 81 TABLET ORAL at 07:57

## 2017-12-19 RX ADMIN — OXYCODONE HYDROCHLORIDE 10 MG: 5 TABLET ORAL at 20:18

## 2017-12-19 NOTE — PROGRESS NOTES
"[facility] Nurse Inpatient Adult WoundAssessment    Initial Assessment  Reason for consultation: Evaluate and treat right foot wound    Assessment:   Right foot 4th toe webspace wound due to Trauma with cellulitis    Status: initial assessment        TREATMENT  PLAN    Right 4th toe webspace wound: Start 1/4 inch Iodoform gauze wick into area after irrigation/cleaning  Orders Written as follows:  1.) Cleanse webspace/periwound with Microklenz on gauze.  Apply pressure to purple area over dorsal base of 4th toe to express any retained creamy drainage. Direct Microklenz on \"stream\" setting at wound and irrigate wound. Dry well  2.)  Cut about 2cm piece of 1/4 inch Iodoform gauze packing strip and gently tuck into wound tunnel using wooden end of sterile cotton tipped applicator, (is only 1cm deep and directed straight in and slightly toward dorsal toe purple area, do not try to pack tightly - just tuck in once)  3.) Unfold 2 gauze 2x2's and tuck between toe the long way for absorption  4.) Apply clotrimazole to any fungal appearing rash on toes/foot, (currently 1st toe and webspace of 1st, 2nd toes)  5.) secure dressings with rolled gauze using some to cover 1st toe if continues to be weepy/open.  Change daily.    Spouse will need teaching on cares.  If they do not feel they will be able to tuck wick of 1/4 gauze into wound then direct them to do all other steps including irrigation and pressure over toe to express drainage.    Pt to follow-up in clinic with Dr. Pascual next week as scheduled.      [facility] Nurse follow-up plan: Will follow as outpatient as needed, have asked that Dr. Pascual refer back to wound care if ongoing needs after he is seen next week.    Nursing to notify the Provider(s) and re-consult the [facility] Nurse if wound(s) deteriorates or new skin concern.    Patient History  According to provider note(s):    This patient is a 52 year old  male who presents to the Emergency Department with concerns of right " foot pain, redness and swelling. Patient stubbed his toe on a shoe tray about 1 week ago. He sustained a scrape between his 4th and 5th toes. Several days ago, patient noticed the foot seemed to be getting red and more painful. He was seen yesterday in clinic an started on Keflex. Patient noticed drainage today from the wound and feels the foot is more red, swollen and painful. There is noted swelling and redness over the entire foot to the ankle. Patient has been wearing socks and shoes since the injury occurred.     Objective Data       Current Diet/ Nutrition:    Active Diet Order      Regular Diet Adult    Output:   I/O last 3 completed shifts:  In: 660 [P.O.:550; I.V.:110]  Out: -     Skin Assessment: Olivier Olivier Score  Av.4  Min: 20  Max: 21                                Olivier Q No Data Recorded                     NSRAS No Data Recorded               Labs:   Recent Labs  Lab 17  0625   HGB 14.3   WBC 7.3   CRP 25.8*         No lab results found in last 7 days.    Physical Exam    Skin assessment:   Focused skin inspection: right foot    Wound Location:  Right foot, 4th/5th webspace, wound is along base of 4th the  Wound History: as above  Measurements (length x width x depth, in cm) fissure like wound along base of toe is 2.5cm x 0.2cm and hole located near dorsal end that is 1cm deep, this is angled slightly up toward dorsum.   Wound Base:  Unable to visualize depth, fissure is pink  Tunneling: (depth as described above  Undermining N/A  Palpation of the wound bed: normal   Periwound skin: edematous, erythema and 3x3 area of more intense erythema with central 1x1 area of purplish discoloration over dorsal base of 4th toe  Temperature: warm  Drainage: serosanguinous drainage on bandage covering wound, with palpation of depth of wound with sterile cotton tipped applicator thick purulent drainage comes from wound.  Pressure applied over area of discoloration and was able to express more creamy  drainage.  Irrigated area with 10 cc's of saline via 10cc syringe with attached 18 gauge angio cath - irrigate fluid was clear/serosanguinous  Odor: none  Pain: during dressing change     Interventions  Current off-loading measures:Per pt knee scooter has been order for discharge  Visual inspection of wound(s) completed  Wound Care:was done per plan of care    Cleansing with Microklenz solution  Supplies: Placed at Bedside  Education provided today: to pt, nursing staff will need to teach spouse        Discussed plan of care with Patient, also spoke with Dr. Real and message left with Dr. Pascual regarding findings and plan of care.    Face to face time: 30 minutes     Deidra Thapa RN, CWOCN

## 2017-12-19 NOTE — PROGRESS NOTES
[facility]           Assessment and Plan:           Latrell Al is a 52 year old male who presented on 12/15/2017 with Right Foot Pain, redness and Swelling            Right foot infection  12/17/17 -- Seems to be better on Cefazolin than at admit but about the same as yesterday  Non Diabetic therefore will monitor only on Cefazolin  12/18/2017 -- improving - MRI negative for osteo or abscess - discussed with podiatry, IV antibiotics until seeing clear improvement, then ok to discharge on orals.     12/19/2017 -- slowly improving, but I think would benefit from at least 1 more day of IV antibiotics with likely discharge on orals tomorrow if doing well.  WOC to see today to confirm plans for bandage.      ? Metallic foreign body in foot  12/19/2017 -- artifact seen on MRI - discussed with radiology and reviewed images with them - looks like a likely microscopic foreign body but is more at the 3rd-4th toe interspace than where injury/infection is - x-ray does show some tiny flecks but these are much more distal than the metal artifact on MRI and there is no evidence of anything where the artifact is.  Overall, they said this is likely microscopic and not something they would pursue or that would be locatable surgically even if we wanted to go after it.  Podiatry said the same, that this is not likely clinically relevant and would not pursue it.  I agree that this does not look tied to his current infection.        HLD  Continue meds      GERD  Continue meds      HTN  12/18/2017 -- heart rate low on atenolol - cut dose in half, follow.    12/19/2017 -- blood pressure normal, heart rate still 50's, decreasing atenolol further to 12.5 mg daily this evening       CAD  12/15/17 -- Stable. Apparently chose conservative approach in '05  -continue aspirin.      DVT Prophylaxis: startin lovenox given longer stay and decreased mobility.      Lines  PIV    Disposition  Hope for discharge home  tomorrow            Interval History:   Slightly better in appearance, symptoms unchanged.  No new concerns.             Review of Systems:    ROS: 10 point ROS neg other than the symptoms noted above in the HPI.             Medications:   Current active medications and PTA medications reviewed, see medication list for details.            Physical Exam:   Vitals were reviewed  Patient Vitals for the past 24 hrs:   BP Temp Temp src Pulse Heart Rate Resp SpO2   17 1057 121/74 97.1  F (36.2  C) Oral - 63 18 97 %   17 0753 120/75 97.5  F (36.4  C) Oral 55 - 16 95 %   17 0315 126/80 97  F (36.1  C) Oral 56 - 18 96 %   17 2325 118/73 97.9  F (36.6  C) Oral 50 - 16 95 %   17 1922 113/82 98.1  F (36.7  C) Oral 52 - 16 96 %   17 1640 124/74 97.4  F (36.3  C) Oral 57 - 18 95 %       Temperatures:  Current - Temp: 97.1  F (36.2  C); Max - Temp  Av.5  F (36.4  C)  Min: 97  F (36.1  C)  Max: 98.1  F (36.7  C)  Respiration range: Resp  Av  Min: 16  Max: 18  Pulse range: Pulse  Av  Min: 50  Max: 57  Blood pressure range: Systolic (24hrs), Av , Min:113 , Max:126   ; Diastolic (24hrs), Av, Min:73, Max:82    Pulse oximetry range: SpO2  Av.7 %  Min: 95 %  Max: 97 %  I/O last 3 completed shifts:  In: 660 [P.O.:550; I.V.:110]  Out: -     Intake/Output Summary (Last 24 hours) at 17 1131  Last data filed at 17 0900   Gross per 24 hour   Intake             1350 ml   Output                0 ml   Net             1350 ml     EXAM:  General: awake and alert, NAD, oriented x 3  Head: normocephalic  Neck: unremarkable, no lymphadenopathy   HEENT: oropharynx pink and moist    Heart: Regular rate and rhythm, no murmurs, rubs, or gallops  Lungs: clear to auscultation bilaterally with good air movement throughout  Abdomen: soft, non-tender, no masses or organomegaly  Extremities: no edema in lower extremities   Skin: erythema slightly improved, just mild change, otherwise  unremarkable.               Data:     Results for orders placed or performed during the hospital encounter of 12/15/17 (from the past 24 hour(s))   MR Foot Right w/o & w Contrast    Narrative    MR FOOT RIGHT WITHOUT AND WITH CONTRAST   12/18/2017 3:23 PM     HISTORY:  Question abscess.    DOSE:  10 mL Gadavist.    FINDINGS: Artifact is present in the webspace between the third/fourth  toes, and to lesser degree between the fourth/fifth toes. Soft tissue  edema is noted within the dorsal soft tissues of the forefoot with  enhancement. However, no rim-enhancing fluid collection is  demonstrated to clearly indicate soft tissue abscess. 0.4 cm cyst is  noted along the dorsal medial aspect of the first metatarsophalangeal  joint, possibly a small ganglion cyst. Small cysts or erosions are  also noted along the medial eminence of the first metatarsal head.  However, marrow signal within the forefoot is otherwise unremarkable  with no evidence of osteomyelitis. There appears to be a physiologic  amount of fluid in the metatarsophalangeal joints. Proximally,  alignment of the tarsometatarsal joints appears within normal limits.      Impression    IMPRESSION:  1. Metallic artifact in the third/fourth and to lesser degree  fourth/fifth web spaces.  2. Prominent soft tissue edema predominantly dorsally within the  forefoot. MR could not distinguish reactive edema from cellulitis.  3. Small 0.4 cm cyst, likely a soft tissue ganglion cyst along the  dorsal medial aspect of the first metatarsophalangeal joint.  4. No rim-enhancing soft tissue fluid collection within the forefoot  to suggest soft tissue abscess.  5. No forefoot marrow edema to suggest osteomyelitis.    JEANINE GRIMALDO MD   CBC with platelets   Result Value Ref Range    WBC 7.3 4.0 - 11.0 10e9/L    RBC Count 4.85 4.4 - 5.9 10e12/L    Hemoglobin 14.3 13.3 - 17.7 g/dL    Hematocrit 43.2 40.0 - 53.0 %    MCV 89 78 - 100 fl    MCH 29.5 26.5 - 33.0 pg    MCHC 33.1 31.5  - 36.5 g/dL    RDW 13.4 10.0 - 15.0 %    Platelet Count 206 150 - 450 10e9/L   Basic metabolic panel   Result Value Ref Range    Sodium 138 133 - 144 mmol/L    Potassium 4.2 3.4 - 5.3 mmol/L    Chloride 102 94 - 109 mmol/L    Carbon Dioxide 32 20 - 32 mmol/L    Anion Gap 4 3 - 14 mmol/L    Glucose 88 70 - 99 mg/dL    Urea Nitrogen 14 7 - 30 mg/dL    Creatinine 0.86 0.66 - 1.25 mg/dL    GFR Estimate >90 >60 mL/min/1.7m2    GFR Estimate If Black >90 >60 mL/min/1.7m2    Calcium 8.4 (L) 8.5 - 10.1 mg/dL   CRP inflammation   Result Value Ref Range    CRP Inflammation 25.8 (H) 0.0 - 8.0 mg/L           Attestation:  I have reviewed today's vital signs, notes, medications, labs and imaging.  Amount of time performed on this daily note: 25 minutes.     Aren Real MD, MD

## 2017-12-19 NOTE — PLAN OF CARE
Problem: Patient Care Overview  Goal: Plan of Care/Patient Progress Review  Outcome: Improving  RLE wound with small amount of drainage.  Wound irrigated and new foam dressing applied.  Red area marked.  Encouraged to elevate RLE.  Edema improving with elevation.  Pain controlled with scheduled Tylenol and PRN oxycodone.

## 2017-12-19 NOTE — PLAN OF CARE
Problem: Patient Care Overview  Goal: Plan of Care/Patient Progress Review  Outcome: Improving  Pt requested and given PRN 10 mg Oxycodone x1 -- medication providing relief. Pt elevauting RLE on pillows in bed. Redness outlined with marker. No increase in redness noted, actually appears less than at midnight. +2/+3 RLE edema. Dressing to wound is c/d/i. Vital signs stable, afebrile. Pt is independent in his room, able to make his needs be known.

## 2017-12-19 NOTE — CONSULTS
Latrell Al is a 52 year old male who was admitted with a chief complaint of an infected  right foot.   The patient relates having fever and chills.  The patient relates jamming the fifth toe on a piece of furniture causing a cut.  The patient relates developing redness and swelling of the right foot over the next couple of days.      REVIEW OF SYSTEMS:  Constitutional, HEENT, cardiovascular, pulmonary, GI, , musculoskeletal, neuro, skin, endocrine and psych systems are negative, except as otherwise noted.     PAST MEDICAL HISTORY:   Past Medical History:   Diagnosis Date     Esophageal reflux      TOBACCO ABUSE-CONTINUOUS         PAST SURGICAL HISTORY:   Past Surgical History:   Procedure Laterality Date     COLONOSCOPY       COLONOSCOPY N/A 7/13/2015    Procedure: COLONOSCOPY;  Surgeon: Maycol Singer MD;  Location: [facility]     SURGICAL HISTORY OF -   12/10/2004    Right Knee Medial Meniscectomy     SURGICAL HISTORY OF -   9/4/2001    EGD     SURGICAL HISTORY OF -   2/7/1995    Partial medial meniscectomy posteromedial horn meniscus, right knee        MEDICATIONS:   Current Facility-Administered Medications:      atenolol (TENORMIN) tablet 25 mg, 25 mg, Oral, QPM, Aren Real MD, 25 mg at 12/18/17 1737     docusate sodium (COLACE) capsule 100 mg, 100 mg, Oral, BID, Aren Real MD, 100 mg at 12/19/17 0757     sodium chloride (PF) 0.9% PF flush 3 mL, 3 mL, Intracatheter, Q8H, Brennan Pena MD, 3 mL at 12/19/17 0800     acetaminophen (TYLENOL) tablet 975 mg, 975 mg, Oral, Q8H, Miryam Hermosillo PA-C, 975 mg at 12/19/17 0316     oxyCODONE IR (ROXICODONE) tablet 5-10 mg, 5-10 mg, Oral, Q4H PRN, Miryam Hermosillo PA-C, 10 mg at 12/19/17 0757     ceFAZolin (ANCEF) intermittent infusion 1 g, 1 g, Intravenous, Q8H, Gilbert Michele MD, Last Rate: 0 mL/hr at 12/18/17 1306, 1 g at 12/19/17 0318     naloxone (NARCAN) injection 0.1-0.4 mg, 0.1-0.4 mg, Intravenous, Q2 Min PRN, Gilbert Michele  MD Thom     ondansetron (ZOFRAN-ODT) ODT tab 4 mg, 4 mg, Oral, Q6H PRN **OR** ondansetron (ZOFRAN) injection 4 mg, 4 mg, Intravenous, Q6H PRN, Gilbert Michele MD     aspirin chewable tablet 81 mg, 81 mg, Oral, Daily, Jolene Pena MD, 81 mg at 12/19/17 0757     lisinopril (PRINIVIL/ZESTRIL) tablet 10 mg, 10 mg, Oral, QPM, Jolene Pena MD, 10 mg at 12/18/17 1643     omeprazole (priLOSEC) CR capsule 20 mg, 20 mg, Oral, Daily, Jolene Pena MD, 20 mg at 12/19/17 0757     ALLERGIES:  No Known Allergies     SOCIAL HISTORY:   Social History     Social History     Marital status:      Spouse name: N/A     Number of children: 0     Years of education: N/A     Occupational History     [employer].     Social History Main Topics     Smoking status: Former Smoker     Packs/day: 0.50     Years: 25.00     Types: Cigarettes, Cigars     Quit date: 1/1/2008     Smokeless tobacco: Never Used     Alcohol use Yes      Comment: 3-6 cans of beer per month     Drug use: No     Sexual activity: Yes     Partners: Female     Other Topics Concern     Parent/Sibling W/ Cabg, Mi Or Angioplasty Before 65f 55m? Yes     mother had angioplasty at 65yo      Service No     Blood Transfusions No     Caffeine Concern Yes     3 cans pop daily     Occupational Exposure Yes          Hobby Hazards No     el     Sleep Concern No     Stress Concern Yes     unemployed     Weight Concern Yes     Special Diet No     Back Care No     Exercise No     Bike Helmet Not Asked     n/a     Seat Belt Yes     Self-Exams Yes     Social History Narrative        FAMILY HISTORY:   Family History   Problem Relation Age of Onset     C.A.D. Father      MI 42     CANCER Father      kidney     Hypertension Mother      DIABETES Mother      HEART DISEASE Mother      CABG     CANCER Mother      lung     Breast Cancer Sister      CANCER Maternal Grandmother      skin     Cancer - colorectal No family hx of      Prostate Cancer No family hx of   "       EXAM:Vitals: /75 (BP Location: Right arm)  Pulse 55  Temp 97.5  F (36.4  C) (Oral)  Resp 16  Ht 5' 9\" (1.753 m)  Wt 229 lb 8 oz (104.1 kg)  SpO2 95%  BMI 33.89 kg/m2  BMI= Body mass index is 33.89 kg/(m^2).    General appearance: Patient is alert and fully cooperative with history & exam.  No sign of distress is noted during the visit.     Psychiatric: Affect is pleasant & appropriate.  Patient appears motivated to improve health.     Respiratory: Breathing is regular & unlabored while sitting.     HEENT: Hearing is intact to spoken word.  Speech is clear.  No gross evidence of visual impairment that would impact ambulation.     Dermatologic:  One notes a fissure between the fourth and fifth toe on the right.  There is noted edema, purulent drainage and ascending erythema.    Vascular: DP & PT pulses are intact & regular bilaterally.  CFT and skin temperature is normal to both lower extremities.     Neurologic: Lower extremity sensation is intact to light touch.  No evidence of weakness or contracture in the lower extremities.  No evidence of neuropathy.     Musculoskeletal: Patient is ambulatory without assistive device or brace.  No gross ankle deformity noted.  No foot or ankle joint effusion is noted.    Labs:  WBC: 13 down to 7.3    Radiographic evaluation including an AP, lateral and medial oblique evaluation of the right foot reveals no cortical erosions or periosteal elevation.  All joint margins appear stable.  There is no apparent fracture or tumor formation noted.  There is  evidence of small metal artifacts along the fifth toe.       MRI of the right foot reveals a small cyst over the first metatarsophalangeal joint.  No abscess or evidence of osteomyelitis noted.    Assessment:  1.  Cellulitis right foot.    Plan:  I have explained to Latrell the condition of the infected wound on the right foot.  I recommend continued IV antibiotics and then two weeks of oral antibiotics upon " discharge.  The patient may follow up in clinic the week following discharge.    PRADEEP Pascual D.P.M., KALANI.F.A.S.

## 2017-12-19 NOTE — PLAN OF CARE
"Problem: Patient Care Overview  Goal: Plan of Care/Patient Progress Review  Outcome: No Change  Pt sits up in bed with RLE elevated on 2 pillows at all times other than when up independently for BRP's. Voiding without difficulty. States has not had a BM since 12/14.  Receiving scheduled docusate. MOM added PRN today and prune juice provided. Eating and drinking well. States pain is \"tolerable with discomfort\" using scheduled tylenol and oxycodone 10 mg every 4 hrs as needed which he requested twice this shift. RLE swollen and red but decreased since time of admission. Pt tolerated initial dressing change and wound cleansing by this nurse at 1130. [facility] nurse evaluated pt this afternoon and redressed wound. Continue with wound cares as instructed and educate pt's wife when she arrives as she will be doing dressing changes for pt at home.       "

## 2017-12-20 VITALS
DIASTOLIC BLOOD PRESSURE: 78 MMHG | SYSTOLIC BLOOD PRESSURE: 123 MMHG | BODY MASS INDEX: 33.99 KG/M2 | OXYGEN SATURATION: 97 % | WEIGHT: 229.5 LBS | RESPIRATION RATE: 18 BRPM | HEIGHT: 69 IN | HEART RATE: 62 BPM | TEMPERATURE: 98.9 F

## 2017-12-20 LAB
ANION GAP SERPL CALCULATED.3IONS-SCNC: 6 MMOL/L (ref 3–14)
BUN SERPL-MCNC: 17 MG/DL (ref 7–30)
CALCIUM SERPL-MCNC: 8.3 MG/DL (ref 8.5–10.1)
CHLORIDE SERPL-SCNC: 99 MMOL/L (ref 94–109)
CO2 SERPL-SCNC: 31 MMOL/L (ref 20–32)
CREAT SERPL-MCNC: 1.01 MG/DL (ref 0.66–1.25)
ERYTHROCYTE [DISTWIDTH] IN BLOOD BY AUTOMATED COUNT: 13.4 % (ref 10–15)
GFR SERPL CREATININE-BSD FRML MDRD: 77 ML/MIN/1.7M2
GLUCOSE SERPL-MCNC: 104 MG/DL (ref 70–99)
HCT VFR BLD AUTO: 43.4 % (ref 40–53)
HGB BLD-MCNC: 14.3 G/DL (ref 13.3–17.7)
MCH RBC QN AUTO: 29.1 PG (ref 26.5–33)
MCHC RBC AUTO-ENTMCNC: 32.9 G/DL (ref 31.5–36.5)
MCV RBC AUTO: 88 FL (ref 78–100)
PLATELET # BLD AUTO: 229 10E9/L (ref 150–450)
POTASSIUM SERPL-SCNC: 4.5 MMOL/L (ref 3.4–5.3)
RBC # BLD AUTO: 4.91 10E12/L (ref 4.4–5.9)
SODIUM SERPL-SCNC: 136 MMOL/L (ref 133–144)
WBC # BLD AUTO: 11.4 10E9/L (ref 4–11)

## 2017-12-20 PROCEDURE — 80048 BASIC METABOLIC PNL TOTAL CA: CPT | Performed by: FAMILY MEDICINE

## 2017-12-20 PROCEDURE — 99239 HOSP IP/OBS DSCHRG MGMT >30: CPT | Performed by: FAMILY MEDICINE

## 2017-12-20 PROCEDURE — 25000132 ZZH RX MED GY IP 250 OP 250 PS 637: Performed by: FAMILY MEDICINE

## 2017-12-20 PROCEDURE — 36415 COLL VENOUS BLD VENIPUNCTURE: CPT | Performed by: FAMILY MEDICINE

## 2017-12-20 PROCEDURE — 25000132 ZZH RX MED GY IP 250 OP 250 PS 637: Performed by: INTERNAL MEDICINE

## 2017-12-20 PROCEDURE — 25000128 H RX IP 250 OP 636: Performed by: EMERGENCY MEDICINE

## 2017-12-20 PROCEDURE — 25000132 ZZH RX MED GY IP 250 OP 250 PS 637: Performed by: PHYSICIAN ASSISTANT

## 2017-12-20 PROCEDURE — 85027 COMPLETE CBC AUTOMATED: CPT | Performed by: FAMILY MEDICINE

## 2017-12-20 RX ORDER — DOCUSATE SODIUM 100 MG/1
100 CAPSULE, LIQUID FILLED ORAL 2 TIMES DAILY
Qty: 60 CAPSULE | Refills: 0 | Status: SHIPPED | OUTPATIENT
Start: 2017-12-20 | End: 2018-06-11

## 2017-12-20 RX ORDER — TADALAFIL 10 MG/1
10-20 TABLET ORAL DAILY PRN
COMMUNITY
Start: 2017-12-20 | End: 2018-06-11

## 2017-12-20 RX ORDER — CLOTRIMAZOLE 1 %
CREAM (GRAM) TOPICAL 2 TIMES DAILY
Qty: 30 G | Refills: 1 | Status: SHIPPED | OUTPATIENT
Start: 2017-12-20 | End: 2019-02-15

## 2017-12-20 RX ORDER — AMOXICILLIN 250 MG
2 CAPSULE ORAL 2 TIMES DAILY PRN
Status: DISCONTINUED | OUTPATIENT
Start: 2017-12-20 | End: 2017-12-20 | Stop reason: HOSPADM

## 2017-12-20 RX ORDER — LACTOBACILLUS ACIDOPHILUS 500MM CELL
1 CAPSULE ORAL
Qty: 30 CAPSULE | Refills: 0 | Status: SHIPPED | OUTPATIENT
Start: 2017-12-20 | End: 2017-12-30

## 2017-12-20 RX ORDER — NITROGLYCERIN 0.4 MG/1
0.4 TABLET SUBLINGUAL EVERY 5 MIN PRN
Qty: 25 TABLET | Refills: 3 | Status: SHIPPED | OUTPATIENT
Start: 2017-12-20 | End: 2017-12-20

## 2017-12-20 RX ORDER — OXYCODONE HYDROCHLORIDE 5 MG/1
5-10 TABLET ORAL EVERY 4 HOURS PRN
Qty: 50 TABLET | Refills: 0 | Status: SHIPPED | OUTPATIENT
Start: 2017-12-20 | End: 2018-06-11

## 2017-12-20 RX ORDER — NITROGLYCERIN 0.4 MG/1
0.4 TABLET SUBLINGUAL EVERY 5 MIN PRN
Qty: 25 TABLET | Refills: 3 | COMMUNITY
Start: 2017-12-20 | End: 2018-06-11

## 2017-12-20 RX ORDER — POLYETHYLENE GLYCOL 3350 17 G/17G
17 POWDER, FOR SOLUTION ORAL DAILY PRN
Status: DISCONTINUED | OUTPATIENT
Start: 2017-12-20 | End: 2017-12-20 | Stop reason: HOSPADM

## 2017-12-20 RX ORDER — BISACODYL 10 MG
10 SUPPOSITORY, RECTAL RECTAL DAILY PRN
Status: DISCONTINUED | OUTPATIENT
Start: 2017-12-20 | End: 2017-12-20 | Stop reason: HOSPADM

## 2017-12-20 RX ORDER — AMOXICILLIN 250 MG
1 CAPSULE ORAL 2 TIMES DAILY PRN
Status: DISCONTINUED | OUTPATIENT
Start: 2017-12-20 | End: 2017-12-20 | Stop reason: HOSPADM

## 2017-12-20 RX ADMIN — CEFAZOLIN SODIUM 1 G: 1 INJECTION, SOLUTION INTRAVENOUS at 11:59

## 2017-12-20 RX ADMIN — OXYCODONE HYDROCHLORIDE 10 MG: 5 TABLET ORAL at 16:05

## 2017-12-20 RX ADMIN — OXYCODONE HYDROCHLORIDE 5 MG: 5 TABLET ORAL at 10:00

## 2017-12-20 RX ADMIN — OXYCODONE HYDROCHLORIDE 5 MG: 5 TABLET ORAL at 10:56

## 2017-12-20 RX ADMIN — CLOTRIMAZOLE: 10 CREAM TOPICAL at 10:02

## 2017-12-20 RX ADMIN — ACETAMINOPHEN 975 MG: 325 TABLET, FILM COATED ORAL at 03:46

## 2017-12-20 RX ADMIN — ASPIRIN 81 MG 81 MG: 81 TABLET ORAL at 08:27

## 2017-12-20 RX ADMIN — CEFAZOLIN SODIUM 1 G: 1 INJECTION, SOLUTION INTRAVENOUS at 03:47

## 2017-12-20 RX ADMIN — ACETAMINOPHEN 975 MG: 325 TABLET, FILM COATED ORAL at 10:56

## 2017-12-20 RX ADMIN — OMEPRAZOLE 20 MG: 20 CAPSULE, DELAYED RELEASE ORAL at 08:27

## 2017-12-20 RX ADMIN — DOCUSATE SODIUM 100 MG: 100 CAPSULE, LIQUID FILLED ORAL at 08:27

## 2017-12-20 RX ADMIN — MAGNESIUM HYDROXIDE 30 ML: 400 SUSPENSION ORAL at 00:24

## 2017-12-20 RX ADMIN — OXYCODONE HYDROCHLORIDE 10 MG: 5 TABLET ORAL at 00:24

## 2017-12-20 NOTE — PLAN OF CARE
Problem: Patient Care Overview  Goal: Plan of Care/Patient Progress Review  Outcome: Improving  Patient edema and redness to RLE improving.  Dressing to wound is C, D, I.  RLE elevated in bed when patient not up to bathroom.  Patient given PRN oxycodone x2 this shift with adequate pain relief.

## 2017-12-20 NOTE — PLAN OF CARE
Problem: Patient Care Overview  Goal: Plan of Care/Patient Progress Review  Outcome: No Change  Pt up in room and ambulated long distance in cano independently. Pt is requesting a knee scooter and shoe or boot at time of discharge. Dressing clean, dry and intact to RLE, redness and edema present but improved. Pt is able to do ankle pumps without significant pain today. Eating and drinking well. No problems voiding. Had BM today. Will do dressing change when wife arrives around 1530 for education on wound care. Continues on ancef every 8 hours.  Afebrile.

## 2017-12-20 NOTE — PLAN OF CARE
Problem: Patient Care Overview  Goal: Plan of Care/Patient Progress Review  Outcome: Improving    Foot improving, Drg. CDI, up on pillows, Indep. To BR. MOM repeated during night w/o results yet. Pain mod. Taking Oxycodone, discussed constipating SE of this med. States still needing this for pain. Able to sleep most of night.

## 2017-12-20 NOTE — DISCHARGE SUMMARY
[facility] Discharge Summary    Latrell Al MRN# [id number]   Age: 52 year old YOB: 1965     Date of Admission:  12/15/2017  Date of Discharge::  12/20/2017  Admitting Physician:  Jolene Pena MD  Discharge Physician:  Aren Real MD, MD             Admission Diagnoses:   Injury of foot, right, superficial, infected, initial encounter [S90.921A, L08.9]  Laceration of lesser toe of right foot without foreign body present or damage to nail, initial encounter [S91.114A]          Principle Discharge Diagnosis:         Right foot infection    See hospital course for further active diagnoses addressed during this admission.            Procedures:   No procedures performed during this admission          Medications Prior to Admission:     Prescriptions Prior to Admission   Medication Sig Dispense Refill Last Dose     IBUPROFEN PO Take 600 mg by mouth every 2 hours Just for foot pain   12/15/2017 at 1400     simvastatin (ZOCOR) 40 MG tablet Take 1 tablet (40 mg) by mouth At Bedtime at bedtime. 90 tablet 3 12/14/2017 at pm     lisinopril (PRINIVIL/ZESTRIL) 10 MG tablet Take 1 tablet (10 mg) by mouth daily 90 tablet 3 12/14/2017 at pm     atenolol (TENORMIN) 50 MG tablet Take 1 tablet (50 mg) by mouth daily 90 tablet 3 12/14/2017 at pm     omeprazole (PRILOSEC OTC) 20 MG tablet Take 1 tablet (20 mg) by mouth daily 90 tablet 3 12/15/2017 at am     ASPIRIN 81 MG OR TABS ONE DAILY   12/15/2017 at am     order for DME Equipment being ordered: Crutches, 1 pair 1 Units 0      [DISCONTINUED] cephALEXin (KEFLEX) 500 MG capsule Take 1 capsule (500 mg) by mouth 3 times daily 30 capsule 0 12/15/2017 at 1200             Discharge Medications:     Current Discharge Medication List      START taking these medications    Details   !! order for DME Equipment being ordered: knee scooter  Qty: 1 Device, Refills: 0      !! order for DME Equipment being ordered:crutches  Qty: 1 Device, Refills: 0    Associated  Diagnoses: Right foot infection; Laceration of lesser toe of right foot without foreign body present or damage to nail, initial encounter      oxyCODONE IR (ROXICODONE) 5 MG tablet Take 1-2 tablets (5-10 mg) by mouth every 4 hours as needed for moderate to severe pain  Qty: 50 tablet, Refills: 0    Associated Diagnoses: Laceration of fifth toe of right foot, initial encounter      amoxicillin-clavulanate (AUGMENTIN) 875-125 MG per tablet Take 1 tablet by mouth 2 times daily  Qty: 20 tablet, Refills: 0    Associated Diagnoses: Right foot infection      Acidophilus Lactobacillus CAPS Take 1 tablet by mouth 3 times daily (with meals) for 10 days  Qty: 30 capsule, Refills: 0    Comments: Ok to substitute alternate probiotic  Associated Diagnoses: Right foot infection      clotrimazole (LOTRIMIN) 1 % cream Apply topically 2 times daily Dispense 1 tube  Qty: 30 g, Refills: 1    Associated Diagnoses: Tinea pedis of both feet      docusate sodium (COLACE) 100 MG capsule Take 1 capsule (100 mg) by mouth 2 times daily While taking oxycodone  Qty: 60 capsule, Refills: 0    Associated Diagnoses: Laceration of fifth toe of right foot, initial encounter       !! - Potential duplicate medications found. Please discuss with provider.      CONTINUE these medications which have CHANGED    Details   tadalafil (CIALIS) 10 MG tablet Take 1-2 tablets (10-20 mg) by mouth daily as needed Hold on file until needed - Do Not take at same time as nitroglycerin    Associated Diagnoses: Erectile dysfunction, unspecified erectile dysfunction type      nitroGLYcerin (NITROSTAT) 0.4 MG sublingual tablet Place 1 tablet (0.4 mg) under the tongue every 5 minutes as needed up to 3 tablets per episode Do  Not take if taking Cialis  Qty: 25 tablet, Refills: 3    Associated Diagnoses: Cardiovascular disease         CONTINUE these medications which have NOT CHANGED    Details   IBUPROFEN PO Take 600 mg by mouth every 2 hours Just for foot pain       simvastatin (ZOCOR) 40 MG tablet Take 1 tablet (40 mg) by mouth At Bedtime at bedtime.  Qty: 90 tablet, Refills: 3    Associated Diagnoses: Hyperlipidemia LDL goal <70      lisinopril (PRINIVIL/ZESTRIL) 10 MG tablet Take 1 tablet (10 mg) by mouth daily  Qty: 90 tablet, Refills: 3    Associated Diagnoses: Cardiovascular disease      atenolol (TENORMIN) 50 MG tablet Take 1 tablet (50 mg) by mouth daily  Qty: 90 tablet, Refills: 3    Associated Diagnoses: Cardiovascular disease      omeprazole (PRILOSEC OTC) 20 MG tablet Take 1 tablet (20 mg) by mouth daily  Qty: 90 tablet, Refills: 3    Associated Diagnoses: Gastroesophageal reflux disease without esophagitis      ASPIRIN 81 MG OR TABS ONE DAILY      !! order for DME Equipment being ordered: Crutches, 1 pair  Qty: 1 Units, Refills: 0    Associated Diagnoses: Cellulitis of right lower extremity       !! - Potential duplicate medications found. Please discuss with provider.      STOP taking these medications       cephALEXin (KEFLEX) 500 MG capsule Comments:   Reason for Stopping:                     Consultations:   Consultation during this admission received from podiatry           Brief History of Illness:     From Admission H+P:   This patient is a 52 year old  male who presents to the Emergency Department with concerns of right foot pain, redness and swelling. Patient stubbed his toe on a shoe tray about 1 week ago. He sustained a scrape between his 4th and 5th toes. Several days ago, patient noticed the foot seemed to be getting red and more painful. He was seen yesterday in clinic an started on Keflex. Patient noticed drainage today from the wound and feels the foot is more red, swollen and painful. There is noted swelling and redness over the entire foot to the ankle. Patient has been wearing socks and shoes since the injury occurred.             TODAY:     Subjective:  Doing well, foot improving, still painful but getting better, no fever or chills.   "No new concerns.,  Feels ready for discharge home today.     ROS:   ROS: 10 point ROS neg other than the symptoms noted above in the HPI.     /78 (BP Location: Right arm)  Pulse 62  Temp 98.9  F (37.2  C) (Oral)  Resp 18  Ht 1.753 m (5' 9\")  Wt 104.1 kg (229 lb 8 oz)  SpO2 97%  BMI 33.89 kg/m2   EXAM:  General: awake and alert, NAD, oriented x 3  Head: normocephalic  Neck: unremarkable, no lymphadenopathy   HEENT: oropharynx pink and moist    Heart: Regular rate and rhythm, no murmurs, rubs, or gallops  Lungs: clear to auscultation bilaterally with good air movement throughout  Abdomen: soft, non-tender, no masses or organomegaly  Extremities: no edema in lower extremities right foot swelling and erythema slowly improving.  No fluctuance, unable to milk out any significant purulent drainage today.  Appears improved.    Skin unremarkable.            Hospital Course:         Latrell Al is a 52 year old male who presented on 12/15/2017 with Right Foot Pain, redness and Swelling             Right foot infection  12/17/17 -- Seems to be better on Cefazolin than at admit but about the same as yesterday  Non Diabetic therefore will monitor only on Cefazolin  12/18/2017 -- improving - MRI negative for osteo or abscess - discussed with podiatry, IV antibiotics until seeing clear improvement, then ok to discharge on orals.     12/19/2017 -- slowly improving, but I think would benefit from at least 1 more day of IV antibiotics with likely discharge on orals tomorrow if doing well.  WOC to see today to confirm plans for bandage.    12/20/2017 -- ok for discharge today - will send on augmentin x 10 days with oxycodone as needed for pain.  Can use crutches or scooter for ambulation - recommend minimizing weight bearing on that foot as much as possible, no prolonged standing or walking.  Off work until follow-up with Dr. Pascual on 12/28.        ? Metallic foreign body in foot  12/19/2017 -- artifact seen on MRI - " discussed with radiology and reviewed images with them - looks like a likely microscopic foreign body but is more at the 3rd-4th toe interspace than where injury/infection is - x-ray does show some tiny flecks but these are much more distal than the metal artifact on MRI and there is no evidence of anything where the artifact is.  Overall, they said this is likely microscopic and not something they would pursue or that would be locatable surgically even if we wanted to go after it.  Podiatry said the same, that this is not likely clinically relevant and would not pursue it.  I agree that this does not look tied to his current infection.        HLD  Continue meds      GERD  Continue meds      HTN  12/18/2017 -- heart rate low on atenolol - cut dose in half, follow.    12/19/2017 -- blood pressure normal, heart rate still 50's, decreasing atenolol further to 12.5 mg daily this evening       CAD  12/15/17 -- Stable. Apparently chose conservative approach in '05  -continue aspirin.                  Discharge Instructions and Follow-Up:   Discharge diet: Regular   Discharge activity: No prolonged standing or walking on right foot.     Discharge follow-up: Follow-up with Dr. Pascual on 12/28/17 as scheduled.             Discharge Disposition:   Discharged to home      Attestation:  I have reviewed today's vital signs, notes, medications, labs and imaging.  Amount of time performed on this discharge summary: 40 minutes.    Aren Real MD, MD

## 2017-12-20 NOTE — DISCHARGE INSTRUCTIONS
"TREATMENT  PLAN     Right 4th toe webspace wound: Start 1/4 inch Iodoform gauze wick into area after irrigation/cleaning  Orders Written as follows:  1.) Cleanse webspace/periwound with Microklenz on gauze.  Apply pressure to purple area over dorsal base of 4th toe to express any retained creamy drainage. Direct Microklenz on \"stream\" setting at wound and irrigate wound. Dry well  2.)  Cut about 2cm piece of 1/4 inch Iodoform gauze packing strip and gently tuck into wound tunnel using wooden end of sterile cotton tipped applicator, (is only 1cm deep and directed straight in and slightly toward dorsal toe purple area, do not try to pack tightly - just tuck in once)  3.) Unfold 2 gauze 2x2's and tuck between toe the long way for absorption  4.) Apply clotrimazole to any fungal appearing rash on toes/foot, (currently 1st toe and webspace of 1st, 2nd toes)  5.) secure dressings with rolled gauze using some to cover 1st toe if continues to be weepy/open.  Change daily.    Spouse will need teaching on cares.  If they do not feel they will be able to tuck wick of 1/4 gauze into wound then direct them to do all other steps including irrigation and pressure over toe to express drainage.    Pt to follow-up in clinic with Dr. Pascual next week as scheduled  "

## 2017-12-21 ENCOUNTER — TELEPHONE (OUTPATIENT)
Dept: FAMILY MEDICINE | Facility: CLINIC | Age: 52
End: 2017-12-21

## 2017-12-21 NOTE — TELEPHONE ENCOUNTER
ED / Discharge Outreach Protocol    Patient Contact    Attempt # 1    Was call answered?  No.  Left message on voicemail with information to call me back.    Pt was admitted on 12/15/17 for right foot infection without abscess or cellulitis.    He was discharged to home on 12/20/17 with instructions to follow up with Dr Pascual, podiatry, 12/28/17.    Mariely Barkley RN

## 2017-12-21 NOTE — TELEPHONE ENCOUNTER
"Hospital/TCU/ED for chronic condition Discharge Protocol    \"Hi, my name is Mariely Barkley, a registered nurse, and I am calling from [facility].  I am calling to follow up and see how things are going for you after your recent emergency visit/hospital/TCU stay.\"    Tell me how you are doing now that you are home?\" Doing better.  Has follow-up appt with Dr Pascual on 12/28/17 for follow up of right foot infection.  Pt explains that his foot slipped on a boot tray  his toes in different directions and causing a cut between his toes on 12/9/17.   It seemed fine at first but worsened as the days progressed.  Pt was seen in clinic on 12/14/17 and admitted on 12/15.  Pt was discharged on 12/20/17.        Discharge Instructions    \"Let's review your discharge instructions.  What is/are the follow-up recommendations?  Pt. Response: Continue oral antibiotic and see Dr Pascual as planned.    \"Has an appointment with your primary care provider been scheduled?\"   Yes. (confirm)    \"When you see the provider, I would recommend that you bring your medications with you.\"    Medications    \"Tell me what changed about your medicines when you discharged?\"    Changes to chronic meds?    0-1    \"What questions do you have about your medications?\"    None     New diagnoses of heart failure, COPD, diabetes, or MI?    No                  Medication reconciliation completed? Yes  Was MTM referral placed (*Make sure to put transitions as reason for referral)?   No    Call Summary    \"What questions or concerns do you have about your recent visit and your follow-up care?\"     none    \"If you have questions or things don't continue to improve, we encourage you contact us through the main clinic number (give number).  Even if the clinic is not open, triage nurses are available 24/7 to help you.     We would like you to know that our clinic has extended hours (provide information).  We also have urgent care (provide details on " "closest location and hours/contact info)\"      \"Thank you for your time and take care!\"         "

## 2017-12-28 ENCOUNTER — OFFICE VISIT (OUTPATIENT)
Dept: PODIATRY | Facility: CLINIC | Age: 52
End: 2017-12-28
Payer: COMMERCIAL

## 2017-12-28 VITALS — HEIGHT: 69 IN | HEART RATE: 80 BPM | BODY MASS INDEX: 33.92 KG/M2 | WEIGHT: 229 LBS

## 2017-12-28 DIAGNOSIS — L03.115 CELLULITIS OF RIGHT FOOT: Primary | ICD-10-CM

## 2017-12-28 PROCEDURE — 99212 OFFICE O/P EST SF 10 MIN: CPT | Performed by: PODIATRIST

## 2017-12-28 NOTE — PROGRESS NOTES
Latrell returns to the office for reevaluation of the right foot.  The patient relates following the instructions given at the last visit with noted less pain.  The patient relates overall more  improvement in pain and function of the right foot.  The patient relates no other problems.    PAST MEDICAL HISTORY:   Past Medical History:   Diagnosis Date     Esophageal reflux      TOBACCO ABUSE-CONTINUOUS        BMI= Body mass index is 33.82 kg/(m^2).    Weight management plan: Patient was referred to their PCP to discuss a diet and exercise plan.    Physical Exam:    General: The patient appears to have a pleasant mental affect.    Lower extremity physical exam:  Neurovascular status is intact with palpable pedal pulses and intact epicritic sensations.  Muscular exam is within normal limits to major muscle groups.  Integument is intact.      One notes decreased edema.  One notes decreased erythema over the right foot.  No drainage noted.     Assessment:      ICD-10-CM    1. Cellulitis of right foot L03.115        Plan:  I have explained to Latrell about the conditions.  At this time, the patient will continue on the oral antibiotics.  The patient will return in one month for reevaluation.    Disclaimer: This note consists of symbols derived from keyboarding, dictation and/or voice recognition software. As a result, there may be errors in the script that have gone undetected. Please consider this when interpreting information found in this chart.       PRADEEP Pascual D.P.M., F.JEFERSON.C.F.A.S.

## 2017-12-28 NOTE — NURSING NOTE
"Chief Complaint   Patient presents with     Consult     Right foot cellulitis, MRI done 12/15/2017       Initial Pulse 80  Ht 5' 9\" (1.753 m)  Wt 229 lb (103.9 kg)  BMI 33.82 kg/m2 Estimated body mass index is 33.82 kg/(m^2) as calculated from the following:    Height as of this encounter: 5' 9\" (1.753 m).    Weight as of this encounter: 229 lb (103.9 kg).  Medication Reconciliation: complete     Leena Thapa MA        "

## 2017-12-28 NOTE — LETTER
December 28, 2017      Latrell Al  [id number] [address] [facility] [id number]        To Whom It May Concern:    Latrell Al was seen in our clinic. He may return to work without restrictions.      Sincerely,          Jim Pascual DPM

## 2017-12-28 NOTE — LETTER
12/28/2017         RE: Latrell Al  [address] [facility] [address]        Dear Colleague,    Thank you for referring your patient, Latrell Al, to the [facility]. Please see a copy of my visit note below.    Latrell returns to the office for reevaluation of the right foot.  The patient relates following the instructions given at the last visit with noted less pain.  The patient relates overall more  improvement in pain and function of the right foot.  The patient relates no other problems.    PAST MEDICAL HISTORY:   Past Medical History:   Diagnosis Date     Esophageal reflux      TOBACCO ABUSE-CONTINUOUS        BMI= Body mass index is 33.82 kg/(m^2).    Weight management plan: Patient was referred to their PCP to discuss a diet and exercise plan.    Physical Exam:    General: The patient appears to have a pleasant mental affect.    Lower extremity physical exam:  Neurovascular status is intact with palpable pedal pulses and intact epicritic sensations.  Muscular exam is within normal limits to major muscle groups.  Integument is intact.      One notes decreased edema.  One notes decreased erythema over the right foot.  No drainage noted.     Assessment:      ICD-10-CM    1. Cellulitis of right foot L03.115        Plan:  I have explained to Latrell about the conditions.  At this time, the patient will continue on the oral antibiotics.  The patient will return in one month for reevaluation.    Disclaimer: This note consists of symbols derived from keyboarding, dictation and/or voice recognition software. As a result, there may be errors in the script that have gone undetected. Please consider this when interpreting information found in this chart.       JAMSHID Nance.P.M., F.A.C.F.A.S.      Again, thank you for allowing me to participate in the care of your patient.        Sincerely,        Jim Pascual DPM

## 2017-12-28 NOTE — MR AVS SNAPSHOT
"              After Visit Summary   12/28/2017    Latrell Al    MRN: [id number]           Patient Information     Date Of Birth          1965        Visit Information        Provider Department      12/28/2017 3:20 PM Jim Pascual DPM [facility]        Today's Diagnoses     Cellulitis of right foot    -  1       Follow-ups after your visit        Follow-up notes from your care team     Return in about 4 weeks (around 1/25/2018).      Who to contact     If you have questions or need follow up information about today's clinic visit or your schedule please contact [facility] directly at [phone].  Normal or non-critical lab and imaging results will be communicated to you by Satagohart, letter or phone within 4 business days after the clinic has received the results. If you do not hear from us within 7 days, please contact the clinic through Caban[employer] or phone. If you have a critical or abnormal lab result, we will notify you by phone as soon as possible.  Submit refill requests through [facility] or call your pharmacy and they will forward the refill request to us. Please allow 3 business days for your refill to be completed.          Additional Information About Your Visit        MyChart Information     [facility] gives you secure access to your electronic health record. If you see a primary care provider, you can also send messages to your care team and make appointments. If you have questions, please call your primary care clinic.  If you do not have a primary care provider, please call [phone] and they will assist you.        Care EveryWhere ID     This is your Care EveryWhere ID. This could be used by other organizations to access your [place] medical records  [id number]        Your Vitals Were     Pulse Height BMI (Body Mass Index)             80 5' 9\" (1.753 m) 33.82 kg/m2          Blood Pressure from Last 3 Encounters: "   12/20/17 123/78   12/14/17 125/78   06/06/17 102/66    Weight from Last 3 Encounters:   12/28/17 229 lb (103.9 kg)   12/15/17 229 lb 8 oz (104.1 kg)   12/14/17 232 lb (105.2 kg)              Today, you had the following     No orders found for display       Primary Care Provider Office Phone # Fax #    Gerald Roche [phone] [address] [facility] [address]        Equal Access to Services     [facility]: Hadii aad ku hadasho Soomaali, waaxda luqadaha, qaybta kaalmada adeegyada, waxlorrie sveerino . So [facility] [phone].    ATENCIÓN: Si habla español, tiene a adam disposición servicios gratuitos de asistencia lingüística. Llame al [phone].    We comply with applicable federal civil rights laws and [place] laws. We do not discriminate on the basis of race, color, national origin, age, disability, sex, sexual orientation, or gender identity.            Thank you!     Thank you for choosing [facility]  for your care. Our goal is always to provide you with excellent care. Hearing back from our patients is one way we can continue to improve our services. Please take a few minutes to complete the written survey that you may receive in the mail after your visit with us. Thank you!             Your Updated Medication List - Protect others around you: Learn how to safely use, store and throw away your medicines at www.disposemymeds.org.          This list is accurate as of: 12/28/17 11:59 PM.  Always use your most recent med list.                   Brand Name Dispense Instructions for use Diagnosis    Acidophilus Lactobacillus Caps     30 capsule    Take 1 tablet by mouth 3 times daily (with meals) for 10 days    Right foot infection       amoxicillin-clavulanate 875-125 MG per tablet    AUGMENTIN    20 tablet    Take 1 tablet by mouth 2 times daily    Right foot infection       aspirin 81 MG tablet      ONE DAILY         atenolol 50 MG tablet    TENORMIN    90 tablet    Take 1 tablet (50 mg) by mouth daily    Cardiovascular disease       CIALIS 10 MG tablet   Generic drug:  tadalafil      Take 1-2 tablets (10-20 mg) by mouth daily as needed Hold on file until needed - Do Not take at same time as nitroglycerin    Erectile dysfunction, unspecified erectile dysfunction type       clotrimazole 1 % cream    LOTRIMIN    30 g    Apply topically 2 times daily Dispense 1 tube    Tinea pedis of both feet       docusate sodium 100 MG capsule    COLACE    60 capsule    Take 1 capsule (100 mg) by mouth 2 times daily While taking oxycodone    Laceration of fifth toe of right foot, initial encounter       IBUPROFEN PO      Take 600 mg by mouth every 2 hours Just for foot pain        lisinopril 10 MG tablet    PRINIVIL/ZESTRIL    90 tablet    Take 1 tablet (10 mg) by mouth daily    Cardiovascular disease       nitroGLYcerin 0.4 MG sublingual tablet    NITROSTAT    25 tablet    Place 1 tablet (0.4 mg) under the tongue every 5 minutes as needed up to 3 tablets per episode Do  Not take if taking Cialis    Cardiovascular disease       omeprazole 20 MG tablet    priLOSEC OTC    90 tablet    Take 1 tablet (20 mg) by mouth daily    Gastroesophageal reflux disease without esophagitis       * order for DME     1 Units    Equipment being ordered: Crutches, 1 pair    Cellulitis of right lower extremity       * order for DME     1 Device    Equipment being ordered: knee scooter        * order for DME     1 Device    Equipment being ordered:crutches    Right foot infection, Laceration of lesser toe of right foot without foreign body present or damage to nail, initial encounter       oxyCODONE IR 5 MG tablet    ROXICODONE    50 tablet    Take 1-2 tablets (5-10 mg) by mouth every 4 hours as needed for moderate to severe pain    Laceration of fifth toe of right foot, initial encounter       simvastatin 40 MG tablet    ZOCOR    90 tablet    Take 1 tablet (40 mg) by  mouth At Bedtime at bedtime.    Hyperlipidemia LDL goal <70       * Notice:  This list has 3 medication(s) that are the same as other medications prescribed for you. Read the directions carefully, and ask your doctor or other care provider to review them with you.

## 2018-06-11 ENCOUNTER — OFFICE VISIT (OUTPATIENT)
Dept: FAMILY MEDICINE | Facility: CLINIC | Age: 53
End: 2018-06-11
Payer: COMMERCIAL

## 2018-06-11 VITALS
TEMPERATURE: 98.2 F | HEIGHT: 69 IN | SYSTOLIC BLOOD PRESSURE: 122 MMHG | DIASTOLIC BLOOD PRESSURE: 84 MMHG | WEIGHT: 227 LBS | HEART RATE: 68 BPM | BODY MASS INDEX: 33.62 KG/M2

## 2018-06-11 DIAGNOSIS — N52.9 ERECTILE DYSFUNCTION, UNSPECIFIED ERECTILE DYSFUNCTION TYPE: ICD-10-CM

## 2018-06-11 DIAGNOSIS — K21.9 GASTROESOPHAGEAL REFLUX DISEASE WITHOUT ESOPHAGITIS: ICD-10-CM

## 2018-06-11 DIAGNOSIS — Z00.00 ENCOUNTER FOR ROUTINE ADULT HEALTH EXAMINATION WITHOUT ABNORMAL FINDINGS: Primary | ICD-10-CM

## 2018-06-11 DIAGNOSIS — Z12.5 SCREENING FOR PROSTATE CANCER: ICD-10-CM

## 2018-06-11 DIAGNOSIS — Z72.0 TOBACCO ABUSE: ICD-10-CM

## 2018-06-11 DIAGNOSIS — I25.10 CARDIOVASCULAR DISEASE: ICD-10-CM

## 2018-06-11 DIAGNOSIS — Z12.11 SPECIAL SCREENING FOR MALIGNANT NEOPLASMS, COLON: ICD-10-CM

## 2018-06-11 DIAGNOSIS — I10 ESSENTIAL HYPERTENSION: ICD-10-CM

## 2018-06-11 DIAGNOSIS — E78.5 HYPERLIPIDEMIA LDL GOAL <70: ICD-10-CM

## 2018-06-11 LAB
ANION GAP SERPL CALCULATED.3IONS-SCNC: 3 MMOL/L (ref 3–14)
BUN SERPL-MCNC: 17 MG/DL (ref 7–30)
CALCIUM SERPL-MCNC: 8.8 MG/DL (ref 8.5–10.1)
CHLORIDE SERPL-SCNC: 104 MMOL/L (ref 94–109)
CHOLEST SERPL-MCNC: 114 MG/DL
CO2 SERPL-SCNC: 31 MMOL/L (ref 20–32)
CREAT SERPL-MCNC: 0.96 MG/DL (ref 0.66–1.25)
GFR SERPL CREATININE-BSD FRML MDRD: 82 ML/MIN/1.7M2
GLUCOSE SERPL-MCNC: 91 MG/DL (ref 70–99)
HDLC SERPL-MCNC: 41 MG/DL
LDLC SERPL CALC-MCNC: 56 MG/DL
NONHDLC SERPL-MCNC: 73 MG/DL
POTASSIUM SERPL-SCNC: 3.7 MMOL/L (ref 3.4–5.3)
PSA SERPL-ACNC: 0.71 UG/L (ref 0–4)
SODIUM SERPL-SCNC: 138 MMOL/L (ref 133–144)
TRIGL SERPL-MCNC: 84 MG/DL

## 2018-06-11 PROCEDURE — G0103 PSA SCREENING: HCPCS | Performed by: FAMILY MEDICINE

## 2018-06-11 PROCEDURE — 36415 COLL VENOUS BLD VENIPUNCTURE: CPT | Performed by: FAMILY MEDICINE

## 2018-06-11 PROCEDURE — 80061 LIPID PANEL: CPT | Performed by: FAMILY MEDICINE

## 2018-06-11 PROCEDURE — 80048 BASIC METABOLIC PNL TOTAL CA: CPT | Performed by: FAMILY MEDICINE

## 2018-06-11 PROCEDURE — 99396 PREV VISIT EST AGE 40-64: CPT | Performed by: FAMILY MEDICINE

## 2018-06-11 RX ORDER — SIMVASTATIN 40 MG
40 TABLET ORAL AT BEDTIME
Qty: 90 TABLET | Refills: 3 | Status: SHIPPED | OUTPATIENT
Start: 2018-06-11 | End: 2019-01-09

## 2018-06-11 RX ORDER — LISINOPRIL 10 MG/1
10 TABLET ORAL DAILY
Qty: 90 TABLET | Refills: 3 | Status: SHIPPED | OUTPATIENT
Start: 2018-06-11 | End: 2019-01-09

## 2018-06-11 RX ORDER — ATENOLOL 50 MG/1
50 TABLET ORAL DAILY
Qty: 90 TABLET | Refills: 3 | Status: SHIPPED | OUTPATIENT
Start: 2018-06-11 | End: 2019-01-09

## 2018-06-11 RX ORDER — NITROGLYCERIN 0.4 MG/1
0.4 TABLET SUBLINGUAL EVERY 5 MIN PRN
Qty: 25 TABLET | Refills: 3 | Status: SHIPPED | OUTPATIENT
Start: 2018-06-11 | End: 2019-02-15

## 2018-06-11 RX ORDER — OMEPRAZOLE 20 MG/1
20 TABLET, DELAYED RELEASE ORAL DAILY
Qty: 90 TABLET | Refills: 3 | Status: SHIPPED | OUTPATIENT
Start: 2018-06-11 | End: 2019-02-15

## 2018-06-11 RX ORDER — TADALAFIL 10 MG/1
20 TABLET ORAL DAILY PRN
Qty: 6 TABLET | Refills: 11 | Status: SHIPPED | OUTPATIENT
Start: 2018-06-11 | End: 2018-06-14

## 2018-06-11 NOTE — LETTER
June 12, 2018      Latrell Al  [address] [facility] [address]        Dear ,    We are writing to inform you of your test results.  No signs of prostate cancer.  Normal kidney function.  Cholesterol is controlled.    Resulted Orders   Basic metabolic panel   Result Value Ref Range    Sodium 138 133 - 144 mmol/L    Potassium 3.7 3.4 - 5.3 mmol/L    Chloride 104 94 - 109 mmol/L    Carbon Dioxide 31 20 - 32 mmol/L    Anion Gap 3 3 - 14 mmol/L    Glucose 91 70 - 99 mg/dL    Urea Nitrogen 17 7 - 30 mg/dL    Creatinine 0.96 0.66 - 1.25 mg/dL    GFR Estimate 82 >60 mL/min/1.7m2      Comment:      Non  GFR Calc    GFR Estimate If Black >90 >60 mL/min/1.7m2      Comment:       GFR Calc    Calcium 8.8 8.5 - 10.1 mg/dL   Lipid panel reflex to direct LDL Fasting   Result Value Ref Range    Cholesterol 114 <200 mg/dL    Triglycerides 84 <150 mg/dL    HDL Cholesterol 41 >39 mg/dL    LDL Cholesterol Calculated 56 <100 mg/dL      Comment:      Desirable:       <100 mg/dl    Non HDL Cholesterol 73 <130 mg/dL   Prostate spec antigen screen   Result Value Ref Range    PSA 0.71 0 - 4 ug/L      Comment:      Assay Method:  Chemiluminescence using Siemens Vista analyzer       If you have any questions or concerns, please call the clinic at the number listed above.       Sincerely,        Gerald Roche MD/sampson

## 2018-06-11 NOTE — PROGRESS NOTES
SUBJECTIVE:   CC: Latrell Al is an 52 year old male who presents for preventative health visit.     Healthy Habits:    Do you get at least three servings of calcium containing foods daily (dairy, green leafy vegetables, etc.)? yes    Amount of exercise or daily activities, outside of work: 0 day(s) per week-working 7 days per week    Problems taking medications regularly No    Medication side effects: No    Have you had an eye exam in the past two years? yes    Do you see a dentist twice per year? yes    Do you have sleep apnea, excessive snoring or daytime drowsiness?no           Today's PHQ-2 Score:   PHQ-2 ( 1999 Pfizer) 6/11/2018 6/6/2017   Q1: Little interest or pleasure in doing things 0 0   Q2: Feeling down, depressed or hopeless 0 0   PHQ-2 Score 0 0     Abuse: Current or Past(Physical, Sexual or Emotional)- No  Do you feel safe in your environment - Yes    Social History   Substance Use Topics     Smoking status: Current Some Day Smoker     Packs/day: 0.25     Years: 25.00     Types: Cigarettes, Cigars     Last attempt to quit: 1/1/2008     Smokeless tobacco: Never Used     Alcohol use Yes      Comment: 3-6 cans of beer per month      If you drink alcohol do you typically have >3 drinks per day or >7 drinks per week? No                      Last PSA:   PSA   Date Value Ref Range Status   06/06/2017 0.56 0 - 4 ug/L Final     Comment:     Assay Method:  Chemiluminescence using Siemens Vista analyzer     The ASCVD Risk score (Leicestertalia AVERY Jr, et al., 2013) failed to calculate for the following reasons:    The valid total cholesterol range is 130 to 320 mg/dL   Patient is eligible for use of low-dose aspirin for primary prevention of heart attack and stroke.  Provider has discussed aspirin with patient and our decision was:     Prescribe:  Daily low-dose aspirin recommended for primary prevention, patient agrees with plan.      Reviewed orders with patient. Reviewed health maintenance and updated orders  "accordingly - Yes      Reviewed and updated as needed this visit by clinical staff  Tobacco  Allergies  Meds  Med Hx  Surg Hx  Fam Hx  Soc Hx        Reviewed and updated as needed this visit by Provider            ROS:  Review Of Systems  Skin: negative  Eyes: negative  Ears/Nose/Throat: negative  Respiratory: No shortness of breath, dyspnea on exertion, cough, or hemoptysis  Cardiovascular: negative  Gastrointestinal: negative  Genitourinary: negative  Musculoskeletal: negative  Neurologic: negative  Psychiatric: negative  Hematologic/Lymphatic/Immunologic: negative  Endocrine: negative      OBJECTIVE:   /84 (Cuff Size: Adult Large)  Pulse 68  Temp 98.2  F (36.8  C) (Tympanic)  Ht 5' 8.5\" (1.74 m)  Wt 227 lb (103 kg)  BMI 34.01 kg/m2  EXAM:  GENERAL: healthy, alert and no distress  EYES: Eyes grossly normal to inspection, PERRL and conjunctivae and sclerae normal  HENT: ear canals and TM's normal, nose and mouth without ulcers or lesions  NECK: no adenopathy, no asymmetry, masses, or scars and thyroid normal to palpation  RESP: lungs clear to auscultation - no rales, rhonchi or wheezes  CV: regular rate and rhythm, normal S1 S2, no S3 or S4, no murmur, click or rub, no peripheral edema and peripheral pulses strong  ABDOMEN: soft, nontender, no hepatosplenomegaly, no masses and bowel sounds normal   (male): normal male genitalia without lesions or urethral discharge, no hernia  MS: no gross musculoskeletal defects noted, no edema  SKIN: no suspicious lesions or rashes  NEURO: Normal strength and tone, mentation intact and speech normal  PSYCH: mentation appears normal, affect normal/bright  LYMPH: anterior cervical: no adenopathy  posterior cervical: no adenopathy    ASSESSMENT/PLAN:   (Z00.00) Encounter for routine adult health examination without abnormal findings  (primary encounter diagnosis)  Comment: Discussed healthy lifestyle and preventative cares.    Plan:     (I25.10) Cardiovascular " "disease  Comment: stable refilled med and check labs  Plan: Lipid panel reflex to direct LDL Fasting,         atenolol (TENORMIN) 50 MG tablet, lisinopril         (PRINIVIL/ZESTRIL) 10 MG tablet, nitroGLYcerin         (NITROSTAT) 0.4 MG sublingual tablet            (I10) Essential hypertension  Comment: controlled and refilled med  Plan: Basic metabolic panel            (E78.5) Hyperlipidemia LDL goal <70  Comment: check lab and refilled med  Plan: Lipid panel reflex to direct LDL Fasting,         simvastatin (ZOCOR) 40 MG tablet            (N52.9) Erectile dysfunction, unspecified erectile dysfunction type  Comment: refilled  Plan: tadalafil (CIALIS) 10 MG tablet            (Z12.5) Screening for prostate cancer  Comment:   Plan: Prostate spec antigen screen            (Z12.11) Special screening for malignant neoplasms, colon  Comment:   Plan: GASTROENTEROLOGY ADULT REF PROCEDURE ONLY            (K21.9) Gastroesophageal reflux disease without esophagitis  Comment:   Plan: omeprazole (PRILOSEC OTC) 20 MG tablet          Recommend to quit smoking.  Declined.      COUNSELING:  Reviewed preventive health counseling, as reflected in patient instructions       Regular exercise       Healthy diet/nutrition       Vision screening       Hearing screening       Colon cancer screening       Prostate cancer screening       reports that he has been smoking Cigarettes and Cigars.  He has a 6.25 pack-year smoking history. He has never used smokeless tobacco.  Tobacco Cessation Action Plan: Information offered: Patient not interested at this time  Estimated body mass index is 34.01 kg/(m^2) as calculated from the following:    Height as of this encounter: 5' 8.5\" (1.74 m).    Weight as of this encounter: 227 lb (103 kg).   Weight management plan: diet and exercise    Counseling Resources:  ATP IV Guidelines  Pooled Cohorts Equation Calculator  FRAX Risk Assessment  ICSI Preventive Guidelines  Dietary Guidelines for Americans, " 2010  USDA's MyPlate  ASA Prophylaxis  Lung CA Screening    Gerald Roche MD  [facility]

## 2018-06-11 NOTE — PATIENT INSTRUCTIONS
Please go to lab.    I refilled your medications.          Thank you for choosing [facility].  You may be receiving a survey in the mail from [facility] [employer] regarding your visit today.  Please take a few minutes to complete and return the survey to let us know how we are doing.      If you have questions or concerns, please contact us via "[employer] or you can contact your care team at [phone].    Our Clinic hours are:  Monday 6:40 am  to 7:00 pm  Tuesday -Friday 6:40 am to 5:00 pm    The [place] outpatient lab hours are:  Monday - Friday 6:10 am to 4:45 pm  Saturdays 7:00 am to 11:00 am  Appointments are required, call [phone]    If you have clinical questions after hours or would like to schedule an appointment,  call the clinic at [phone].    Preventive Health Recommendations  Male Ages 50   64    Yearly exam:             See your health care provider every year in order to  o   Review health changes.   o   Discuss preventive care.    o   Review your medicines if your doctor has prescribed any.     Have a cholesterol test every 5 years, or more frequently if you are at risk for high cholesterol/heart disease.     Have a diabetes test (fasting glucose) every three years. If you are at risk for diabetes, you should have this test more often.     Have a colonoscopy at age 50, or have a yearly FIT test (stool test). These exams will check for colon cancer.      Talk with your health care provider about whether or not a prostate cancer screening test (PSA) is right for you.    You should be tested each year for STDs (sexually transmitted diseases), if you re at risk.     Shots: Get a flu shot each year. Get a tetanus shot every 10 years.     Nutrition:    Eat at least 5 servings of fruits and vegetables daily.     Eat whole-grain bread, whole-wheat pasta and brown rice instead of white grains and rice.     Talk to your provider about Calcium and Vitamin D.     Lifestyle    Exercise for at least 150  minutes a week (30 minutes a day, 5 days a week). This will help you control your weight and prevent disease.     Limit alcohol to one drink per day.     No smoking.     Wear sunscreen to prevent skin cancer.     See your dentist every six months for an exam and cleaning.     See your eye doctor every 1 to 2 years.

## 2018-06-11 NOTE — MR AVS SNAPSHOT
After Visit Summary   6/11/2018    Latrell Al    MRN: [id number]           Patient Information     Date Of Birth          1965        Visit Information        Provider Department      6/11/2018 4:20 PM Gerald Roche MD [facility]        Today's Diagnoses     Encounter for routine adult health examination without abnormal findings    -  1    Cardiovascular disease        Essential hypertension        Hyperlipidemia LDL goal <70        Erectile dysfunction, unspecified erectile dysfunction type        Screening for prostate cancer        Special screening for malignant neoplasms, colon        Gastroesophageal reflux disease without esophagitis          Care Instructions    Please go to lab.    I refilled your medications.          Thank you for choosing [facility].  You may be receiving a survey in the mail from [employer] regarding your visit today.  Please take a few minutes to complete and return the survey to let us know how we are doing.      If you have questions or concerns, please contact us via [employer] or you can contact your care team at [phone].    Our Clinic hours are:  Monday 6:40 am  to 7:00 pm  Tuesday -Friday 6:40 am to 5:00 pm    The [place] outpatient lab hours are:  Monday - Friday 6:10 am to 4:45 pm  Saturdays 7:00 am to 11:00 am  Appointments are required, call [phone]    If you have clinical questions after hours or would like to schedule an appointment,  call the clinic at [phone].    Preventive Health Recommendations  Male Ages 50 - 64    Yearly exam:             See your health care provider every year in order to  o   Review health changes.   o   Discuss preventive care.    o   Review your medicines if your doctor has prescribed any.     Have a cholesterol test every 5 years, or more frequently if you are at risk for high cholesterol/heart disease.     Have a diabetes test (fasting glucose) every three years. If you are at  risk for diabetes, you should have this test more often.     Have a colonoscopy at age 50, or have a yearly FIT test (stool test). These exams will check for colon cancer.      Talk with your health care provider about whether or not a prostate cancer screening test (PSA) is right for you.    You should be tested each year for STDs (sexually transmitted diseases), if you re at risk.     Shots: Get a flu shot each year. Get a tetanus shot every 10 years.     Nutrition:    Eat at least 5 servings of fruits and vegetables daily.     Eat whole-grain bread, whole-wheat pasta and brown rice instead of white grains and rice.     Talk to your provider about Calcium and Vitamin D.     Lifestyle    Exercise for at least 150 minutes a week (30 minutes a day, 5 days a week). This will help you control your weight and prevent disease.     Limit alcohol to one drink per day.     No smoking.     Wear sunscreen to prevent skin cancer.     See your dentist every six months for an exam and cleaning.     See your eye doctor every 1 to 2 years.            Follow-ups after your visit        Additional Services     GASTROENTEROLOGY ADULT REF PROCEDURE ONLY       Last Lab Result: Creatinine (mg/dL)       Date                     Value                 12/20/2017               1.01             ----------  Body mass index is 34.01 kg/(m^2).     Needed:  No  Language:  English    Patient will be contacted to schedule procedure.     Please be aware that coverage of these services is subject to the terms and limitations of your health insurance plan.  Call member services at your health plan with any benefit or coverage questions.  Any procedures must be performed at a [facility] facility OR coordinated by your clinic's referral office.    Please bring the following with you to your appointment:    (1) Any X-Rays, CTs or MRIs which have been performed.  Contact the facility where they were done to arrange for  prior to your  "scheduled appointment.    (2) List of current medications   (3) This referral request   (4) Any documents/labs given to you for this referral                  Follow-up notes from your care team     Return in about 6 months (around 12/11/2018).      Who to contact     If you have questions or need follow up information about today's clinic visit or your schedule please contact [facility] directly at [phone].  Normal or non-critical lab and imaging results will be communicated to you by [employer], letter or phone within 4 business days after the clinic has received the results. If you do not hear from us within 7 days, please contact the clinic through [employer] or phone. If you have a critical or abnormal lab result, we will notify you by phone as soon as possible.  Submit refill requests through [employer] or call your pharmacy and they will forward the refill request to us. Please allow 3 business days for your refill to be completed.          Additional Information About Your Visit        COIN[employer]harZeolife Information     [employer] gives you secure access to your electronic health record. If you see a primary care provider, you can also send messages to your care team and make appointments. If you have questions, please call your primary care clinic.  If you do not have a primary care provider, please call [phone] and they will assist you.        Care EveryWhere ID     This is your Care EveryWhere ID. This could be used by other organizations to access your [facility] medical records  [id number]        Your Vitals Were     Pulse Temperature Height BMI (Body Mass Index)          68 98.2  F (36.8  C) (Tympanic) 5' 8.5\" (1.74 m) 34.01 kg/m2         Blood Pressure from Last 3 Encounters:   06/11/18 122/84   12/20/17 123/78   12/14/17 125/78    Weight from Last 3 Encounters:   06/11/18 227 lb (103 kg)   12/28/17 229 lb (103.9 kg)   12/15/17 229 lb 8 oz (104.1 kg)              We Performed the Following     Basic " metabolic panel     GASTROENTEROLOGY ADULT REF PROCEDURE ONLY     Lipid panel reflex to direct LDL Fasting     Prostate spec antigen screen          Today's Medication Changes          These changes are accurate as of 6/11/18  4:47 PM.  If you have any questions, ask your nurse or doctor.               These medicines have changed or have updated prescriptions.        Dose/Directions    tadalafil 10 MG tablet   Commonly known as:  CIALIS   This may have changed:  how much to take   Used for:  Erectile dysfunction, unspecified erectile dysfunction type   Changed by:  Gerald Roche MD        Dose:  20 mg   Take 2 tablets (20 mg) by mouth daily as needed Hold on file until needed - Do Not take at same time as nitroglycerin   Quantity:  6 tablet   Refills:  11         Stop taking these medicines if you haven't already. Please contact your care team if you have questions.     amoxicillin-clavulanate 875-125 MG per tablet   Commonly known as:  AUGMENTIN   Stopped by:  Gerald Roche MD           docusate sodium 100 MG capsule   Commonly known as:  COLACE   Stopped by:  Gerald Roche MD           IBUPROFEN PO   Stopped by:  Gerald Roche MD           order for DME   Stopped by:  Gerald Roche MD           oxyCODONE IR 5 MG tablet   Commonly known as:  ROXICODONE   Stopped by:  Gerald Roche MD                Where to get your medicines      These medications were sent to [facility] - [place], [place] - [address] [facility] [address] [facility]  [address] [facility] [address]     Phone:  [phone]     atenolol 50 MG tablet    lisinopril 10 MG tablet    omeprazole 20 MG tablet    simvastatin 40 MG tablet         These medications were sent to [place], [place] - [address] [facility]  [address] [place] [address]     Phone:  [phone]     nitroGLYcerin 0.4 MG sublingual tablet    tadalafil 10 MG tablet                 Primary Care Provider Office Phone # Fax #    Gerald Roche [phone] [address] [facility] [address]        Equal Access to Services     NATHAN HUGHES : Hadii aad ku hadcarmelinao Sotoi, waaxda luqadaha, qaybta kaalmada juanda, veronique burton paulabdiel greenberg laPalomojesica flor. So [facility] [phone].    ATENCIÓN: Si habla español, tiene a adam disposición servicios gratuitos de asistencia lingüística. Llame al [phone].    We comply with applicable federal civil rights laws and [place] laws. We do not discriminate on the basis of race, color, national origin, age, disability, sex, sexual orientation, or gender identity.            Thank you!     Thank you for choosing [facility]  for your care. Our goal is always to provide you with excellent care. Hearing back from our patients is one way we can continue to improve our services. Please take a few minutes to complete the written survey that you may receive in the mail after your visit with us. Thank you!             Your Updated Medication List - Protect others around you: Learn how to safely use, store and throw away your medicines at www.disposemymeds.org.          This list is accurate as of 6/11/18  4:47 PM.  Always use your most recent med list.                   Brand Name Dispense Instructions for use Diagnosis    aspirin 81 MG tablet      ONE DAILY        atenolol 50 MG tablet    TENORMIN    90 tablet    Take 1 tablet (50 mg) by mouth daily    Cardiovascular disease       clotrimazole 1 % cream    LOTRIMIN    30 g    Apply topically 2 times daily Dispense 1 tube    Tinea pedis of both feet       lisinopril 10 MG tablet    PRINIVIL/ZESTRIL    90 tablet    Take 1 tablet (10 mg) by mouth daily    Cardiovascular disease       nitroGLYcerin 0.4 MG sublingual tablet    NITROSTAT    25 tablet    Place 1 tablet (0.4 mg) under the tongue every 5 minutes as needed up to 3 tablets per episode Do  Not take if taking Cialis     Cardiovascular disease       omeprazole 20 MG tablet    priLOSEC OTC    90 tablet    Take 1 tablet (20 mg) by mouth daily    Gastroesophageal reflux disease without esophagitis       simvastatin 40 MG tablet    ZOCOR    90 tablet    Take 1 tablet (40 mg) by mouth At Bedtime at bedtime.    Hyperlipidemia LDL goal <70       tadalafil 10 MG tablet    CIALIS    6 tablet    Take 2 tablets (20 mg) by mouth daily as needed Hold on file until needed - Do Not take at same time as nitroglycerin    Erectile dysfunction, unspecified erectile dysfunction type

## 2018-06-13 ENCOUNTER — TELEPHONE (OUTPATIENT)
Dept: FAMILY MEDICINE | Facility: CLINIC | Age: 53
End: 2018-06-13

## 2018-06-13 DIAGNOSIS — N52.9 ERECTILE DYSFUNCTION, UNSPECIFIED ERECTILE DYSFUNCTION TYPE: Primary | ICD-10-CM

## 2018-06-13 NOTE — TELEPHONE ENCOUNTER
Patient would like to try and alternative to Cialis that will be less expensive, like viagra or revatio. Pleas call patient with questions.    Thanks,   Elly Alcantar  Certified Pharmacy Technician  [facility]  [phone]

## 2018-06-14 RX ORDER — SILDENAFIL 100 MG/1
100 TABLET, FILM COATED ORAL DAILY PRN
Qty: 6 TABLET | Refills: 11 | Status: SHIPPED | OUTPATIENT
Start: 2018-06-14 | End: 2019-02-15

## 2018-06-15 NOTE — TELEPHONE ENCOUNTER
Ok for  to share this information with pt.   Alternative to Cialis has been sent to pharmacy.   Left message for pt to return a call to the clinic.  KAREL Barkley RN

## 2018-07-19 ENCOUNTER — ANESTHESIA EVENT (OUTPATIENT)
Dept: GASTROENTEROLOGY | Facility: CLINIC | Age: 53
End: 2018-07-19
Payer: COMMERCIAL

## 2018-07-19 ASSESSMENT — LIFESTYLE VARIABLES: TOBACCO_USE: 1

## 2018-07-19 NOTE — ANESTHESIA PREPROCEDURE EVALUATION
Anesthesia Evaluation     . Pt has had prior anesthetic. Type: MAC           ROS/MED HX    ENT/Pulmonary:     (+)tobacco use, Current use 1 packs/day  , . .    Neurologic:  - neg neurologic ROS     Cardiovascular:     (+) Dyslipidemia, hypertension--CAD, -past MI,-. : . . . :. . Previous cardiac testing Echodate:4/7/2009results:Order     ECHO HEART XTHORACIC, STRESS/REST (81300 (CPT )) (Order [id number])      Exam Information     Exam Date Exam Time Accession # Results     4/7/09  8:07 AM [id number]      Polar OLED Interactive Report and [employer]     View the interactive report     Component Results     Component    XCELERA       Interpretation Summary  THIS IS A NORMAL STRESS ECHOCARDIOGRAM.  Exercise and EKG portion reported separately.  There is no comparison study available.  No evidence of ischemia or infarction. Normal valves. There is trace   tricuspid regurgitation. The visual ejection fraction is estimated at 55-60%.   Normal left ventricular wall motion  PatientHeight: 69 in  PatientWeight: 235 lbs  SystolicPressure: 128 mmHg  DiastolicPressure: 74 mmHg  HeartRate: 77 bpm  BSA 2.2 m^2     Baseline  Normal left ventricular wall motion.  The visual ejection fraction is estimated at 55-60%.     Stress  Exercise and EKG portion reported separately.  Left ventricular cavity size decreases with exercise.  Global LV systolic function augments with exercise.  Normal resting wall motion and no stress-induced wall motion abnormality.     Left Ventricle  There is normal left ventricular wall thickness.     Atria  Normal left atrial size.     Mitral Valve  The mitral valve is normal in structure and function.  There is no evidence of mitral valve prolapse.  There is no mitral regurgitation noted.     Tricuspid Valve  The tricuspid valve is normal in structure and function.  There is trace tricuspid regurgitation.     Aortic Valve  The aortic valve is normal in structure and function.     Vessels  The aortic root  is normal size.     Pericardial/Pleural  There is no pericardial effusion.     Procedure  Stress Echo Complete.        Stress Results  Protocol:  [place]odi Protocol    Target HR: 149 bpm            Maximum Predicted HR: 175 bpm      Stage        Duration(mm:ss)  HR(bpm)   BP        DOSE  Comment         Stress Duration: 10:00 mm:ss  Recovery Time: 00:00 mm:ss    Maximum Stress HR: 171 bpm    METS: 13      Interpreting Physician:  Roby Miranda MD electronically signed on   04- 10:22:20       Stress Testdate:10-26-05 results:Small reversible mid and anterior wall defectECG reviewed date:3/2/2009 results:SB (58) date: results:          METS/Exercise Tolerance:  >4 METS   Hematologic:  - neg hematologic  ROS       Musculoskeletal:  - neg musculoskeletal ROS       GI/Hepatic: Comment: Increased risk of aspiration due to GERD    (+) GERD      (-) liver disease   Renal/Genitourinary: Comment: ED        Endo:     (+) Obesity, .      Psychiatric:  - neg psychiatric ROS       Infectious Disease:  - neg infectious disease ROS       Malignancy:      - no malignancy   Other:    - neg other ROS                 Physical Exam  Normal systems: cardiovascular, pulmonary and dental    Airway   Mallampati: II    Dental     Cardiovascular       Pulmonary                     Anesthesia Plan      History & Physical Review  History and physical reviewed and following examination; no interval change.    ASA Status:  2 .        Plan for General and MAC Reason for MAC:  Deep or markedly invasive procedure (G8)         Postoperative Care      Consents  Anesthetic plan, risks, benefits and alternatives discussed with:  Patient..                          .

## 2018-07-30 ENCOUNTER — HOSPITAL ENCOUNTER (OUTPATIENT)
Facility: CLINIC | Age: 53
Discharge: HOME | End: 2018-07-30
Attending: SURGERY | Admitting: SURGERY
Payer: COMMERCIAL

## 2018-07-30 ENCOUNTER — SURGERY (OUTPATIENT)
Age: 53
End: 2018-07-30

## 2018-07-30 ENCOUNTER — ANESTHESIA (OUTPATIENT)
Dept: GASTROENTEROLOGY | Facility: CLINIC | Age: 53
End: 2018-07-30
Payer: COMMERCIAL

## 2018-07-30 VITALS
BODY MASS INDEX: 34.46 KG/M2 | DIASTOLIC BLOOD PRESSURE: 71 MMHG | SYSTOLIC BLOOD PRESSURE: 106 MMHG | WEIGHT: 230 LBS | RESPIRATION RATE: 14 BRPM | TEMPERATURE: 97.9 F | OXYGEN SATURATION: 95 %

## 2018-07-30 LAB — COLONOSCOPY: NORMAL

## 2018-07-30 PROCEDURE — 25000125 ZZHC RX 250: Performed by: SURGERY

## 2018-07-30 PROCEDURE — 25000128 H RX IP 250 OP 636: Performed by: NURSE ANESTHETIST, CERTIFIED REGISTERED

## 2018-07-30 PROCEDURE — G0105 COLORECTAL SCRN; HI RISK IND: HCPCS | Performed by: SURGERY

## 2018-07-30 PROCEDURE — 37000008 ZZH ANESTHESIA TECHNICAL FEE, 1ST 30 MIN: Performed by: SURGERY

## 2018-07-30 PROCEDURE — 25000125 ZZHC RX 250: Performed by: NURSE ANESTHETIST, CERTIFIED REGISTERED

## 2018-07-30 PROCEDURE — 45378 DIAGNOSTIC COLONOSCOPY: CPT | Performed by: SURGERY

## 2018-07-30 RX ORDER — GLYCOPYRROLATE 0.2 MG/ML
INJECTION, SOLUTION INTRAMUSCULAR; INTRAVENOUS PRN
Status: DISCONTINUED | OUTPATIENT
Start: 2018-07-30 | End: 2018-07-30

## 2018-07-30 RX ORDER — PROPOFOL 10 MG/ML
INJECTION, EMULSION INTRAVENOUS CONTINUOUS PRN
Status: DISCONTINUED | OUTPATIENT
Start: 2018-07-30 | End: 2018-07-30

## 2018-07-30 RX ORDER — SODIUM CHLORIDE, SODIUM LACTATE, POTASSIUM CHLORIDE, CALCIUM CHLORIDE 600; 310; 30; 20 MG/100ML; MG/100ML; MG/100ML; MG/100ML
INJECTION, SOLUTION INTRAVENOUS CONTINUOUS
Status: DISCONTINUED | OUTPATIENT
Start: 2018-07-30 | End: 2018-07-30 | Stop reason: HOSPADM

## 2018-07-30 RX ORDER — PROPOFOL 10 MG/ML
INJECTION, EMULSION INTRAVENOUS PRN
Status: DISCONTINUED | OUTPATIENT
Start: 2018-07-30 | End: 2018-07-30

## 2018-07-30 RX ORDER — LIDOCAINE 40 MG/G
CREAM TOPICAL
Status: DISCONTINUED | OUTPATIENT
Start: 2018-07-30 | End: 2018-07-30 | Stop reason: HOSPADM

## 2018-07-30 RX ORDER — ONDANSETRON 2 MG/ML
4 INJECTION INTRAMUSCULAR; INTRAVENOUS
Status: DISCONTINUED | OUTPATIENT
Start: 2018-07-30 | End: 2018-07-30 | Stop reason: HOSPADM

## 2018-07-30 RX ADMIN — LIDOCAINE HYDROCHLORIDE 1 ML: 10 INJECTION, SOLUTION EPIDURAL; INFILTRATION; INTRACAUDAL; PERINEURAL at 11:35

## 2018-07-30 RX ADMIN — GLYCOPYRROLATE 0.2 MG: 0.2 INJECTION, SOLUTION INTRAMUSCULAR; INTRAVENOUS at 12:21

## 2018-07-30 RX ADMIN — SODIUM CHLORIDE, POTASSIUM CHLORIDE, SODIUM LACTATE AND CALCIUM CHLORIDE: 600; 310; 30; 20 INJECTION, SOLUTION INTRAVENOUS at 11:34

## 2018-07-30 RX ADMIN — PROPOFOL 30 MG: 10 INJECTION, EMULSION INTRAVENOUS at 12:21

## 2018-07-30 RX ADMIN — PROPOFOL 30 MG: 10 INJECTION, EMULSION INTRAVENOUS at 12:22

## 2018-07-30 RX ADMIN — PROPOFOL 10 MG: 10 INJECTION, EMULSION INTRAVENOUS at 12:23

## 2018-07-30 RX ADMIN — PROPOFOL 30 MG: 10 INJECTION, EMULSION INTRAVENOUS at 12:20

## 2018-07-30 RX ADMIN — PROPOFOL 200 MCG/KG/MIN: 10 INJECTION, EMULSION INTRAVENOUS at 12:23

## 2018-07-30 NOTE — H&P
52 year old year old male here for colonoscopy for screening.    Patient Active Problem List   Diagnosis     Cardiovascular disease     Essential hypertension     Hyperlipidemia LDL goal <70     GERD (gastroesophageal reflux disease)     ED (erectile dysfunction)     Right foot infection     Cellulitis of foot, right     Tobacco abuse       Past Medical History:   Diagnosis Date     Esophageal reflux      TOBACCO ABUSE-CONTINUOUS        Past Surgical History:   Procedure Laterality Date     COLONOSCOPY       COLONOSCOPY N/A 7/13/2015    Procedure: COLONOSCOPY;  Surgeon: Maycol Singer MD;  Location: [facility]     SURGICAL HISTORY OF -   12/10/2004    Right Knee Medial Meniscectomy     SURGICAL HISTORY OF -   9/4/2001    EGD     SURGICAL HISTORY OF -   2/7/1995    Partial medial meniscectomy posteromedial horn meniscus, right knee       @FMX@    No current outpatient prescriptions on file.       No Known Allergies    Pt reports that he has been smoking Cigarettes and Cigars.  He has a 6.25 pack-year smoking history. He has never used smokeless tobacco. He reports that he drinks alcohol. He reports that he does not use illicit drugs.    Exam:  There were no vitals taken for this visit.    Awake, Alert OX3  Lungs - CTA bilaterally  CV - RRR, no murmurs, distal pulses intact  Abd - soft, non-distended, non-tender, +BS  Extr - No cyanosis or edema    A/P 52 year old year old male in need of colonoscopy for screening. Risks, benefits, alternatives, and complications were discussed including the possibility of perforation and the patient agreed to proceed    Latrell Proctor MD

## 2018-07-30 NOTE — ANESTHESIA CARE TRANSFER NOTE
Patient: Latrell Al    Procedure(s):  colonoscopy - Wound Class: II-Clean Contaminated    Diagnosis: screening  Diagnosis Additional Information: No value filed.    Anesthesia Type:   General, MAC     Note:  Airway :Room Air  Patient transferred to:Phase II  Handoff Report: Identifed the Patient, Identified the Reponsible Provider, Reviewed the pertinent medical history, Discussed the surgical course, Reviewed Intra-OP anesthesia mangement and issues during anesthesia, Set expectations for post-procedure period and Allowed opportunity for questions and acknowledgement of understanding      Vitals: (Last set prior to Anesthesia Care Transfer)    CRNA VITALS  7/30/2018 1202 - 7/30/2018 1235      7/30/2018             Pulse: 69    Ht Rate: 69    SpO2: 97 %                Electronically Signed By: Sidney Nayak CRNA, APRN CRNA  July 30, 2018  12:35 PM

## 2018-07-30 NOTE — ANESTHESIA POSTPROCEDURE EVALUATION
Patient: Latrell Al    Procedure(s):  colonoscopy - Wound Class: II-Clean Contaminated    Diagnosis:screening  Diagnosis Additional Information: No value filed.    Anesthesia Type:  General, MAC    Note:  Anesthesia Post Evaluation    Patient location during evaluation: Phase 2 and Bedside  Patient participation: Able to fully participate in evaluation  Level of consciousness: awake  Pain management: adequate  Airway patency: patent  Cardiovascular status: acceptable  Respiratory status: acceptable  Hydration status: acceptable  PONV: none     Anesthetic complications: None          Last vitals:  Vitals:    07/30/18 1127   BP: 116/79   Resp: 17   Temp: 36.6  C (97.9  F)   SpO2: 98%         Electronically Signed By: Sidney Nayak CRNA, APRN CRNA  July 30, 2018  12:36 PM

## 2018-11-24 ENCOUNTER — MYC REFILL (OUTPATIENT)
Dept: FAMILY MEDICINE | Facility: CLINIC | Age: 53
End: 2018-11-24

## 2018-11-24 DIAGNOSIS — I25.10 CARDIOVASCULAR DISEASE: ICD-10-CM

## 2018-11-24 DIAGNOSIS — E78.5 HYPERLIPIDEMIA LDL GOAL <70: ICD-10-CM

## 2018-11-24 RX ORDER — SIMVASTATIN 40 MG
40 TABLET ORAL AT BEDTIME
Qty: 90 TABLET | Refills: 3 | Status: CANCELLED | OUTPATIENT
Start: 2018-11-24

## 2018-11-24 RX ORDER — ATENOLOL 50 MG/1
50 TABLET ORAL DAILY
Qty: 90 TABLET | Refills: 3 | Status: CANCELLED | OUTPATIENT
Start: 2018-11-24

## 2018-11-24 RX ORDER — LISINOPRIL 10 MG/1
10 TABLET ORAL DAILY
Qty: 90 TABLET | Refills: 3 | Status: CANCELLED | OUTPATIENT
Start: 2018-11-24

## 2018-11-26 NOTE — TELEPHONE ENCOUNTER
Message from [employer]:  Original authorizing provider: Gerald Roche MD    Ishaan Al would like a refill of the following medications:  atenolol (TENORMIN) 50 MG tablet [Gerald Roche MD]  lisinopril (PRINIVIL/ZESTRIL) 10 MG tablet [Gerald Roche MD]  simvastatin (ZOCOR) 40 MG tablet [Gerald Roche MD]    Preferred pharmacy: Other - [facility]    Comment:  I have new insurance. Please send these to [facility].

## 2018-11-26 NOTE — TELEPHONE ENCOUNTER
"Requested Prescriptions   Pending Prescriptions Disp Refills     atenolol (TENORMIN) 50 MG tablet 90 tablet 3     Sig: Take 1 tablet (50 mg) by mouth daily    Beta-Blockers Protocol Passed    11/26/2018  7:01 AM       Passed - Blood pressure under 140/90 in past 12 months    BP Readings from Last 3 Encounters:   07/30/18 106/71   06/11/18 122/84   12/20/17 123/78                Passed - Patient is age 6 or older       Passed - Recent (12 mo) or future (30 days) visit within the authorizing provider's specialty    Patient had office visit in the last 12 months or has a visit in the next 30 days with authorizing provider or within the authorizing provider's specialty.  See \"Patient Info\" tab in inbasket, or \"Choose Columns\" in Meds & Orders section of the refill encounter.              lisinopril (PRINIVIL/ZESTRIL) 10 MG tablet 90 tablet 3     Sig: Take 1 tablet (10 mg) by mouth daily    ACE Inhibitors (Including Combos) Protocol Passed    11/26/2018  7:01 AM       Passed - Blood pressure under 140/90 in past 12 months    BP Readings from Last 3 Encounters:   07/30/18 106/71   06/11/18 122/84   12/20/17 123/78                Passed - Recent (12 mo) or future (30 days) visit within the authorizing provider's specialty    Patient had office visit in the last 12 months or has a visit in the next 30 days with authorizing provider or within the authorizing provider's specialty.  See \"Patient Info\" tab in inbasket, or \"Choose Columns\" in Meds & Orders section of the refill encounter.             Passed - Patient is age 18 or older       Passed - Normal serum creatinine on file in past 12 months    Recent Labs   Lab Test  06/11/18   1657   CR  0.96            Passed - Normal serum potassium on file in past 12 months    Recent Labs   Lab Test  06/11/18   1657   POTASSIUM  3.7             simvastatin (ZOCOR) 40 MG tablet 90 tablet 3     Sig: Take 1 tablet (40 mg) by mouth At Bedtime at bedtime.    Statins Protocol Passed    " "11/26/2018  7:01 AM       Passed - LDL on file in past 12 months    Recent Labs   Lab Test  06/11/18   1657   LDL  56            Passed - No abnormal creatine kinase in past 12 months    No lab results found.            Passed - Recent (12 mo) or future (30 days) visit within the authorizing provider's specialty    Patient had office visit in the last 12 months or has a visit in the next 30 days with authorizing provider or within the authorizing provider's specialty.  See \"Patient Info\" tab in inbasket, or \"Choose Columns\" in Meds & Orders section of the refill encounter.             Passed - Patient is age 18 or older          "

## 2019-01-09 ENCOUNTER — TELEPHONE (OUTPATIENT)
Dept: FAMILY MEDICINE | Facility: CLINIC | Age: 54
End: 2019-01-09

## 2019-01-09 DIAGNOSIS — E78.5 HYPERLIPIDEMIA LDL GOAL <70: ICD-10-CM

## 2019-01-09 DIAGNOSIS — I25.10 CARDIOVASCULAR DISEASE: ICD-10-CM

## 2019-01-09 RX ORDER — LISINOPRIL 10 MG/1
10 TABLET ORAL DAILY
Qty: 30 TABLET | Refills: 0 | Status: SHIPPED | OUTPATIENT
Start: 2019-01-09 | End: 2019-02-15

## 2019-01-09 RX ORDER — ATENOLOL 50 MG/1
50 TABLET ORAL DAILY
Qty: 30 TABLET | Refills: 0 | Status: SHIPPED | OUTPATIENT
Start: 2019-01-09 | End: 2019-02-15

## 2019-01-09 RX ORDER — SIMVASTATIN 40 MG
40 TABLET ORAL AT BEDTIME
Qty: 30 TABLET | Refills: 0 | Status: SHIPPED | OUTPATIENT
Start: 2019-01-09 | End: 2019-02-15

## 2019-01-09 NOTE — TELEPHONE ENCOUNTER
Patient Comments:   Yearly Physical.     I have new insurance and I have been told that I need to see the doctor before I can get any refills.  However, I will run out of my medication before my appointment.  Can I get a 30 day supply for the following medications sent to [facility]? Atenolol, Simvastatin, Lisinopril.

## 2019-02-15 ENCOUNTER — OFFICE VISIT (OUTPATIENT)
Dept: FAMILY MEDICINE | Facility: CLINIC | Age: 54
End: 2019-02-15
Payer: COMMERCIAL

## 2019-02-15 VITALS
HEART RATE: 66 BPM | WEIGHT: 234.4 LBS | OXYGEN SATURATION: 96 % | TEMPERATURE: 98.6 F | RESPIRATION RATE: 16 BRPM | BODY MASS INDEX: 35.12 KG/M2 | DIASTOLIC BLOOD PRESSURE: 76 MMHG | SYSTOLIC BLOOD PRESSURE: 135 MMHG

## 2019-02-15 DIAGNOSIS — Z00.00 ENCOUNTER FOR ROUTINE ADULT HEALTH EXAMINATION WITHOUT ABNORMAL FINDINGS: Primary | ICD-10-CM

## 2019-02-15 DIAGNOSIS — E78.5 HYPERLIPIDEMIA LDL GOAL <70: ICD-10-CM

## 2019-02-15 DIAGNOSIS — E66.01 MORBID OBESITY (H): ICD-10-CM

## 2019-02-15 DIAGNOSIS — Z12.5 SCREENING FOR PROSTATE CANCER: ICD-10-CM

## 2019-02-15 DIAGNOSIS — K21.9 GASTROESOPHAGEAL REFLUX DISEASE WITHOUT ESOPHAGITIS: ICD-10-CM

## 2019-02-15 DIAGNOSIS — N52.9 ERECTILE DYSFUNCTION, UNSPECIFIED ERECTILE DYSFUNCTION TYPE: ICD-10-CM

## 2019-02-15 DIAGNOSIS — I10 ESSENTIAL HYPERTENSION: ICD-10-CM

## 2019-02-15 DIAGNOSIS — Z72.0 TOBACCO ABUSE: ICD-10-CM

## 2019-02-15 DIAGNOSIS — I25.10 CARDIOVASCULAR DISEASE: ICD-10-CM

## 2019-02-15 LAB
ANION GAP SERPL CALCULATED.3IONS-SCNC: 4 MMOL/L (ref 3–14)
BUN SERPL-MCNC: 16 MG/DL (ref 7–30)
CALCIUM SERPL-MCNC: 8.8 MG/DL (ref 8.5–10.1)
CHLORIDE SERPL-SCNC: 102 MMOL/L (ref 94–109)
CHOLEST SERPL-MCNC: 129 MG/DL
CO2 SERPL-SCNC: 32 MMOL/L (ref 20–32)
CREAT SERPL-MCNC: 0.99 MG/DL (ref 0.66–1.25)
GFR SERPL CREATININE-BSD FRML MDRD: 85 ML/MIN/{1.73_M2}
GLUCOSE SERPL-MCNC: 98 MG/DL (ref 70–99)
HDLC SERPL-MCNC: 43 MG/DL
LDLC SERPL CALC-MCNC: 63 MG/DL
NONHDLC SERPL-MCNC: 86 MG/DL
POTASSIUM SERPL-SCNC: 4 MMOL/L (ref 3.4–5.3)
PSA SERPL-ACNC: 0.65 UG/L (ref 0–4)
SODIUM SERPL-SCNC: 138 MMOL/L (ref 133–144)
TRIGL SERPL-MCNC: 113 MG/DL

## 2019-02-15 PROCEDURE — G0103 PSA SCREENING: HCPCS | Performed by: FAMILY MEDICINE

## 2019-02-15 PROCEDURE — 99396 PREV VISIT EST AGE 40-64: CPT | Performed by: FAMILY MEDICINE

## 2019-02-15 PROCEDURE — 36415 COLL VENOUS BLD VENIPUNCTURE: CPT | Performed by: FAMILY MEDICINE

## 2019-02-15 PROCEDURE — 80061 LIPID PANEL: CPT | Performed by: FAMILY MEDICINE

## 2019-02-15 PROCEDURE — 80048 BASIC METABOLIC PNL TOTAL CA: CPT | Performed by: FAMILY MEDICINE

## 2019-02-15 RX ORDER — ATENOLOL 50 MG/1
50 TABLET ORAL DAILY
Qty: 90 TABLET | Refills: 3 | Status: SHIPPED | OUTPATIENT
Start: 2019-02-15 | End: 2020-02-21

## 2019-02-15 RX ORDER — LISINOPRIL 10 MG/1
10 TABLET ORAL DAILY
Qty: 90 TABLET | Refills: 3 | Status: SHIPPED | OUTPATIENT
Start: 2019-02-15 | End: 2020-02-21

## 2019-02-15 RX ORDER — SILDENAFIL 100 MG/1
100 TABLET, FILM COATED ORAL DAILY PRN
Qty: 6 TABLET | Refills: 11 | Status: SHIPPED | OUTPATIENT
Start: 2019-02-15 | End: 2020-02-21

## 2019-02-15 RX ORDER — SIMVASTATIN 40 MG
40 TABLET ORAL AT BEDTIME
Qty: 90 TABLET | Refills: 3 | Status: SHIPPED | OUTPATIENT
Start: 2019-02-15 | End: 2020-02-21

## 2019-02-15 RX ORDER — OMEPRAZOLE 20 MG/1
20 TABLET, DELAYED RELEASE ORAL DAILY
Qty: 90 TABLET | Refills: 3 | Status: SHIPPED | OUTPATIENT
Start: 2019-02-15 | End: 2019-02-25

## 2019-02-15 RX ORDER — NITROGLYCERIN 0.4 MG/1
0.4 TABLET SUBLINGUAL EVERY 5 MIN PRN
Qty: 25 TABLET | Refills: 3 | Status: SHIPPED | OUTPATIENT
Start: 2019-02-15 | End: 2020-02-21

## 2019-02-15 ASSESSMENT — PAIN SCALES - GENERAL: PAINLEVEL: MILD PAIN (2)

## 2019-02-15 NOTE — LETTER
February 19, 2019      Latrell Al  [address] [facility] [address]        Dear ,    We are writing to inform you of your test results.  You have no signs of prostate cancer.  You have normal cholesterol.You have normal kidney function and glucose.  Resulted Orders   Prostate spec antigen screen   Result Value Ref Range    PSA 0.65 0 - 4 ug/L      Comment:      Assay Method:  Chemiluminescence using Siemens Vista analyzer   Basic metabolic panel   Result Value Ref Range    Sodium 138 133 - 144 mmol/L    Potassium 4.0 3.4 - 5.3 mmol/L    Chloride 102 94 - 109 mmol/L    Carbon Dioxide 32 20 - 32 mmol/L    Anion Gap 4 3 - 14 mmol/L    Glucose 98 70 - 99 mg/dL      Comment:      Fasting specimen    Urea Nitrogen 16 7 - 30 mg/dL    Creatinine 0.99 0.66 - 1.25 mg/dL    GFR Estimate 85 >60 mL/min/[1.73_m2]      Comment:      Non  GFR Calc  Starting 12/18/2018, serum creatinine based estimated GFR (eGFR) will be   calculated using the Chronic Kidney Disease Epidemiology Collaboration   (CKD-EPI) equation.      GFR Estimate If Black >90 >60 mL/min/[1.73_m2]      Comment:       GFR Calc  Starting 12/18/2018, serum creatinine based estimated GFR (eGFR) will be   calculated using the Chronic Kidney Disease Epidemiology Collaboration   (CKD-EPI) equation.      Calcium 8.8 8.5 - 10.1 mg/dL   Lipid panel reflex to direct LDL Fasting   Result Value Ref Range    Cholesterol 129 <200 mg/dL    Triglycerides 113 <150 mg/dL      Comment:      Fasting specimen    HDL Cholesterol 43 >39 mg/dL    LDL Cholesterol Calculated 63 <100 mg/dL      Comment:      Desirable:       <100 mg/dl    Non HDL Cholesterol 86 <130 mg/dL       If you have any questions or concerns, please call the clinic at the number listed above.       Sincerely,        Gerald Roche MD/sampson

## 2019-02-15 NOTE — PATIENT INSTRUCTIONS
Please go to lab.    I refilled your medications.    I recommend to quit smoking completely.    For your skin tags if you want them removed please make a 20 minutes office visit.  You have one on the left side of your neck and one in right axilla.    Please stay active and try to lose 10-15 pounds over the next year.      Preventive Health Recommendations  Male Ages 50 - 64    Yearly exam:             See your health care provider every year in order to  o   Review health changes.   o   Discuss preventive care.    o   Review your medicines if your doctor has prescribed any.     Have a cholesterol test every 5 years, or more frequently if you are at risk for high cholesterol/heart disease.     Have a diabetes test (fasting glucose) every three years. If you are at risk for diabetes, you should have this test more often.     Have a colonoscopy at age 50, or have a yearly FIT test (stool test). These exams will check for colon cancer.      Talk with your health care provider about whether or not a prostate cancer screening test (PSA) is right for you.    You should be tested each year for STDs (sexually transmitted diseases), if you re at risk.     Shots: Get a flu shot each year. Get a tetanus shot every 10 years.     Nutrition:    Eat at least 5 servings of fruits and vegetables daily.     Eat whole-grain bread, whole-wheat pasta and brown rice instead of white grains and rice.     Get adequate Calcium and Vitamin D.     Lifestyle    Exercise for at least 150 minutes a week (30 minutes a day, 5 days a week). This will help you control your weight and prevent disease.     Limit alcohol to one drink per day.     No smoking.     Wear sunscreen to prevent skin cancer.     See your dentist every six months for an exam and cleaning.     See your eye doctor every 1 to 2 years.

## 2019-02-15 NOTE — NURSING NOTE
"Chief Complaint   Patient presents with     Physical       Initial /76 (BP Location: Right arm, Patient Position: Chair, Cuff Size: Adult Large)   Pulse 66   Temp 98.6  F (37  C) (Tympanic)   Resp 16   Wt 106.3 kg (234 lb 6.4 oz)   SpO2 96%   BMI 35.12 kg/m   Estimated body mass index is 35.12 kg/m  as calculated from the following:    Height as of 6/11/18: 1.74 m (5' 8.5\").    Weight as of this encounter: 106.3 kg (234 lb 6.4 oz).    Medication Reconciliation: complete  Dinorah Pierce MA  "

## 2019-02-15 NOTE — PROGRESS NOTES
SUBJECTIVE:   CC: Latrell Al is an 53 year old male who presents for preventive health visit.     Healthy Habits:    Do you get at least three servings of calcium containing foods daily (dairy, green leafy vegetables, etc.)? yes    Amount of exercise or daily activities, outside of work: 0 day(s) per week outside of work.     Problems taking medications regularly No    Medication side effects: No    Have you had an eye exam in the past two years? Yes-no concerns     Do you see a dentist twice per year? No- annually for cleanings     Do you have sleep apnea, excessive snoring or daytime drowsiness?Wife c/o snoring     Today's PHQ-2 Score:   PHQ-2 (  [employer]) 2/15/2019 2018   Q1: Little interest or pleasure in doing things 0 0   Q2: Feeling down, depressed or hopeless 0 0   PHQ-2 Score 0 0     Abuse: Current or Past(Physical, Sexual or Emotional)- No  Do you feel safe in your environment? Yes    Social History     Tobacco Use     Smoking status: Current Some Day Smoker     Packs/day: 0.25     Years: 25.00     Pack years: 6.25     Types: Cigarettes, Cigars     Last attempt to quit: 2008     Years since quittin.1     Smokeless tobacco: Never Used   Substance Use Topics     Alcohol use: Yes     Comment: 3-6 cans of beer per month     If you drink alcohol do you typically have >3 drinks per day or >7 drinks per week? No                      Last PSA:   PSA   Date Value Ref Range Status   2018 0.71 0 - 4 ug/L Final     Comment:     Assay Method:  Chemiluminescence using Siemens Vista analyzer     Reviewed orders with patient. Reviewed health maintenance and updated orders accordingly - Yes      Reviewed and updated as needed this visit by clinical staff  Tobacco  Allergies  Meds  Med Hx  Surg Hx  Fam Hx  Soc Hx      Reviewed and updated as needed this visit by Provider            ROS:  Review Of Systems  Skin: negative  Eyes: negative  Ears/Nose/Throat: negative  Respiratory: No  shortness of breath, dyspnea on exertion, cough, or hemoptysis  Cardiovascular: negative  Gastrointestinal: negative  Genitourinary: has ED  Musculoskeletal: negative  Neurologic: negative  Psychiatric: negative  Hematologic/Lymphatic/Immunologic: negative  Endocrine: negative      OBJECTIVE:   /76 (BP Location: Right arm, Patient Position: Chair, Cuff Size: Adult Large)   Pulse 66   Temp 98.6  F (37  C) (Tympanic)   Resp 16   Wt 106.3 kg (234 lb 6.4 oz)   SpO2 96%   BMI 35.12 kg/m    EXAM:  GENERAL: healthy, alert and no distress  EYES: Eyes grossly normal to inspection, PERRL and conjunctivae and sclerae normal  HENT: ear canals and TM's normal, nose and mouth without ulcers or lesions  NECK: no adenopathy, no asymmetry, masses, or scars and thyroid normal to palpation  RESP: lungs clear to auscultation - no rales, rhonchi or wheezes  CV: regular rate and rhythm, normal S1 S2, no S3 or S4, no murmur, click or rub, no peripheral edema and peripheral pulses strong  ABDOMEN: soft, nontender, no hepatosplenomegaly, no masses and bowel sounds normal   (male): normal male genitalia without lesions or urethral discharge, no hernia  MS: no gross musculoskeletal defects noted, no edema  SKIN: no suspicious lesions or rashes  SKIN: noted two skin tags  NEURO: Normal strength and tone, mentation intact and speech normal  PSYCH: mentation appears normal, affect normal/bright        ASSESSMENT/PLAN:   (Z00.00) Encounter for routine adult health examination without abnormal findings  (primary encounter diagnosis)  Comment: Discussed healthy lifestyle and preventative cares.    Plan:     (E66.01) Morbid obesity (H)  Comment: recommend to lose 10-15# over the next year  Plan:     (I10) Essential hypertension  Comment: controlled  Plan: Basic metabolic panel            (E78.5) Hyperlipidemia LDL goal <70  Comment: check lab and refilled med  Plan: Lipid panel reflex to direct LDL Fasting,         simvastatin (ZOCOR)  "40 MG tablet            (Z12.5) Screening for prostate cancer  Comment:   Plan: Prostate spec antigen screen            (I25.10) Cardiovascular disease  Comment:   Plan: atenolol (TENORMIN) 50 MG tablet, lisinopril         (PRINIVIL/ZESTRIL) 10 MG tablet, nitroGLYcerin         (NITROSTAT) 0.4 MG sublingual tablet            (K21.9) Gastroesophageal reflux disease without esophagitis  Comment:   Plan: omeprazole (PRILOSEC OTC) 20 MG EC tablet            (N52.9) Erectile dysfunction, unspecified erectile dysfunction type  Comment: refilled discussed also potential drug interaction if med used within 48 hrs of ntg use  Plan: sildenafil (VIAGRA) 100 MG tablet            (Z72.0) Tobacco abuse  Comment: recommend to quit completely  Plan:     COUNSELING:  Reviewed preventive health counseling, as reflected in patient instructions       Regular exercise       Healthy diet/nutrition       Colon cancer screening       Prostate cancer screening    BP Readings from Last 1 Encounters:   02/15/19 135/76     Estimated body mass index is 35.12 kg/m  as calculated from the following:    Height as of 6/11/18: 1.74 m (5' 8.5\").    Weight as of this encounter: 106.3 kg (234 lb 6.4 oz).      Weight management plan: as above     reports that he has been smoking cigarettes and cigars.  He has a 6.25 pack-year smoking history. he has never used smokeless tobacco.  Tobacco Cessation Action Plan: Information offered: Patient not interested at this time    Counseling Resources:  ATP IV Guidelines  Pooled Cohorts Equation Calculator  FRAX Risk Assessment  ICSI Preventive Guidelines  Dietary Guidelines for Americans, 2010  USDA's MyPlate  ASA Prophylaxis  Lung CA Screening    Gerald Roche MD  [facility]  "

## 2019-02-18 DIAGNOSIS — K21.9 GASTROESOPHAGEAL REFLUX DISEASE WITHOUT ESOPHAGITIS: ICD-10-CM

## 2019-02-18 RX ORDER — OMEPRAZOLE 20 MG/1
20 TABLET, DELAYED RELEASE ORAL DAILY
Qty: 90 TABLET | Refills: 3 | Status: CANCELLED | OUTPATIENT
Start: 2019-02-18

## 2019-02-18 NOTE — TELEPHONE ENCOUNTER
NO PRILOSEC OTC 20MG tab stocked only OMEPRAZOLE 20mg        Requested Prescriptions   Pending Prescriptions Disp Refills     omeprazole (PRILOSEC OTC) 20 MG EC tablet 90 tablet 3     Sig: Take 1 tablet (20 mg) by mouth daily    There is no refill protocol information for this order   Last Written Prescription Date:  2/15/19  Last Fill Quantity: 90 tab,  # refills: 36   Last office visit: 2/15/2019 with prescribing provider:  Gerald Roche     Future Office Visit:

## 2019-02-19 ENCOUNTER — MYC MEDICAL ADVICE (OUTPATIENT)
Dept: FAMILY MEDICINE | Facility: CLINIC | Age: 54
End: 2019-02-19

## 2019-02-21 ENCOUNTER — MYC REFILL (OUTPATIENT)
Dept: FAMILY MEDICINE | Facility: CLINIC | Age: 54
End: 2019-02-21

## 2019-02-21 DIAGNOSIS — I25.10 CARDIOVASCULAR DISEASE: ICD-10-CM

## 2019-02-21 DIAGNOSIS — E78.5 HYPERLIPIDEMIA LDL GOAL <70: ICD-10-CM

## 2019-02-21 RX ORDER — ATENOLOL 50 MG/1
50 TABLET ORAL DAILY
Qty: 90 TABLET | Refills: 3 | Status: CANCELLED | OUTPATIENT
Start: 2019-02-21

## 2019-02-21 RX ORDER — LISINOPRIL 10 MG/1
10 TABLET ORAL DAILY
Qty: 90 TABLET | Refills: 3 | Status: CANCELLED | OUTPATIENT
Start: 2019-02-21

## 2019-02-21 RX ORDER — SIMVASTATIN 40 MG
40 TABLET ORAL AT BEDTIME
Qty: 90 TABLET | Refills: 3 | Status: CANCELLED | OUTPATIENT
Start: 2019-02-21

## 2019-02-22 NOTE — TELEPHONE ENCOUNTER
We do not stock PRILOSEC OTC 20MG TABLETS We do stock OMEPRAZOLE 20MG CAPSULES The prescriptions could be changed to an alternative medication. Please include drug, strength and directions.  Thank you

## 2019-11-03 ENCOUNTER — HEALTH MAINTENANCE LETTER (OUTPATIENT)
Age: 54
End: 2019-11-03

## 2020-02-21 ENCOUNTER — OFFICE VISIT (OUTPATIENT)
Dept: FAMILY MEDICINE | Facility: CLINIC | Age: 55
End: 2020-02-21
Payer: COMMERCIAL

## 2020-02-21 VITALS
RESPIRATION RATE: 16 BRPM | OXYGEN SATURATION: 96 % | TEMPERATURE: 97.6 F | BODY MASS INDEX: 35.1 KG/M2 | HEART RATE: 59 BPM | SYSTOLIC BLOOD PRESSURE: 130 MMHG | WEIGHT: 237 LBS | DIASTOLIC BLOOD PRESSURE: 88 MMHG | HEIGHT: 69 IN

## 2020-02-21 DIAGNOSIS — E66.01 MORBID OBESITY (H): ICD-10-CM

## 2020-02-21 DIAGNOSIS — M25.532 PAIN IN BOTH WRISTS: ICD-10-CM

## 2020-02-21 DIAGNOSIS — E78.5 HYPERLIPIDEMIA LDL GOAL <70: ICD-10-CM

## 2020-02-21 DIAGNOSIS — M25.531 PAIN IN BOTH WRISTS: ICD-10-CM

## 2020-02-21 DIAGNOSIS — N52.9 ERECTILE DYSFUNCTION, UNSPECIFIED ERECTILE DYSFUNCTION TYPE: ICD-10-CM

## 2020-02-21 DIAGNOSIS — I25.10 CARDIOVASCULAR DISEASE: ICD-10-CM

## 2020-02-21 DIAGNOSIS — Z00.00 ROUTINE GENERAL MEDICAL EXAMINATION AT A HEALTH CARE FACILITY: Primary | ICD-10-CM

## 2020-02-21 DIAGNOSIS — D17.30 LIPOMA OF SKIN AND SUBCUTANEOUS TISSUE: ICD-10-CM

## 2020-02-21 DIAGNOSIS — Z12.5 SCREENING FOR PROSTATE CANCER: ICD-10-CM

## 2020-02-21 LAB
ANION GAP SERPL CALCULATED.3IONS-SCNC: <1 MMOL/L (ref 3–14)
BUN SERPL-MCNC: 19 MG/DL (ref 7–30)
CALCIUM SERPL-MCNC: 8.5 MG/DL (ref 8.5–10.1)
CHLORIDE SERPL-SCNC: 104 MMOL/L (ref 94–109)
CHOLEST SERPL-MCNC: 142 MG/DL
CO2 SERPL-SCNC: 33 MMOL/L (ref 20–32)
CREAT SERPL-MCNC: 0.94 MG/DL (ref 0.66–1.25)
GFR SERPL CREATININE-BSD FRML MDRD: 90 ML/MIN/{1.73_M2}
GLUCOSE SERPL-MCNC: 105 MG/DL (ref 70–99)
HDLC SERPL-MCNC: 44 MG/DL
LDLC SERPL CALC-MCNC: 69 MG/DL
NONHDLC SERPL-MCNC: 98 MG/DL
POTASSIUM SERPL-SCNC: 4 MMOL/L (ref 3.4–5.3)
PSA SERPL-ACNC: 0.66 UG/L (ref 0–4)
SODIUM SERPL-SCNC: 137 MMOL/L (ref 133–144)
TRIGL SERPL-MCNC: 143 MG/DL

## 2020-02-21 PROCEDURE — 99396 PREV VISIT EST AGE 40-64: CPT | Performed by: FAMILY MEDICINE

## 2020-02-21 PROCEDURE — 80048 BASIC METABOLIC PNL TOTAL CA: CPT | Performed by: FAMILY MEDICINE

## 2020-02-21 PROCEDURE — 80061 LIPID PANEL: CPT | Performed by: FAMILY MEDICINE

## 2020-02-21 PROCEDURE — 99213 OFFICE O/P EST LOW 20 MIN: CPT | Mod: 25 | Performed by: FAMILY MEDICINE

## 2020-02-21 PROCEDURE — G0103 PSA SCREENING: HCPCS | Performed by: FAMILY MEDICINE

## 2020-02-21 PROCEDURE — 36415 COLL VENOUS BLD VENIPUNCTURE: CPT | Performed by: FAMILY MEDICINE

## 2020-02-21 RX ORDER — ATENOLOL 50 MG/1
50 TABLET ORAL DAILY
Qty: 90 TABLET | Refills: 3 | Status: SHIPPED | OUTPATIENT
Start: 2020-02-21 | End: 2020-02-28

## 2020-02-21 RX ORDER — SILDENAFIL 100 MG/1
100 TABLET, FILM COATED ORAL DAILY PRN
Qty: 6 TABLET | Refills: 11 | Status: SHIPPED | OUTPATIENT
Start: 2020-02-21 | End: 2020-02-28

## 2020-02-21 RX ORDER — LISINOPRIL 10 MG/1
10 TABLET ORAL DAILY
Qty: 90 TABLET | Refills: 3 | Status: SHIPPED | OUTPATIENT
Start: 2020-02-21 | End: 2020-02-28

## 2020-02-21 RX ORDER — SIMVASTATIN 40 MG
40 TABLET ORAL AT BEDTIME
Qty: 90 TABLET | Refills: 3 | Status: SHIPPED | OUTPATIENT
Start: 2020-02-21 | End: 2020-02-28

## 2020-02-21 RX ORDER — NITROGLYCERIN 0.4 MG/1
0.4 TABLET SUBLINGUAL EVERY 5 MIN PRN
Qty: 25 TABLET | Refills: 3 | Status: SHIPPED | OUTPATIENT
Start: 2020-02-21 | End: 2020-02-28

## 2020-02-21 ASSESSMENT — MIFFLIN-ST. JEOR: SCORE: 1891.43

## 2020-02-21 NOTE — PATIENT INSTRUCTIONS
Please go to lab.    I refilled your medications for the year.    I do recommend to quit smoking.    Please work on some weight loss.  Please try to lose 15-20 pounds over the next year.    I am checking for control of your cholesterol, checking your kidney function and side effects of your medications, screening for diabetes and screening for prostate cancer.    I did do a referral to sports medicine since I think you are developing early carpal tunnel syndrome.  I want to see what sports medicine can do for you.      You have a lipoma on your left wrist, it does not seem to be a ganglion cyst since it is does not seem to be at a joint.  Just monitor it at this time.  It is benign.  It is a collection of fat cells.    Please be aware that there will be an additional charge during your preventative visit due to either a new diagnosis and/or chronic disease management.    Preventative visits screen for diseases prior to they occur.  They do not cover for any new diagnosis or chronic disease management which would include medication refills, labs etc.    If you have questions regarding your coverage please check with your insurance provider.  At [facility] we need to code correctly to be in compliance with all insurance companies.          Thank you for choosing [facility].  You may be receiving an email and/or telephone survey request from [facility] Experience regarding your visit today.  Please take a few minutes to respond to the survey to let us know how we are doing.      If you have questions or concerns, please contact us via [employer] or you can contact your care team at [phone].    Our Clinic hours are:  Monday 6:40 am  to 7:00 pm  Tuesday -Friday 6:40 am to 5:00 pm    The [place] outpatient lab hours are:  Monday - Friday 6:10 am to 4:45 pm  Saturdays 7:00 am to 11:00 am  Appointments are required, call [phone]    If you have clinical questions after hours or would like to schedule an  appointment,  call the clinic at [phone].    Preventive Health Recommendations  Male Ages 50 - 64    Yearly exam:             See your health care provider every year in order to  o   Review health changes.   o   Discuss preventive care.    o   Review your medicines if your doctor has prescribed any.     Have a cholesterol test every 5 years, or more frequently if you are at risk for high cholesterol/heart disease.     Have a diabetes test (fasting glucose) every three years. If you are at risk for diabetes, you should have this test more often.     Have a colonoscopy at age 50, or have a yearly FIT test (stool test). These exams will check for colon cancer.      Talk with your health care provider about whether or not a prostate cancer screening test (PSA) is right for you.    You should be tested each year for STDs (sexually transmitted diseases), if you re at risk.     Shots: Get a flu shot each year. Get a tetanus shot every 10 years.     Nutrition:    Eat at least 5 servings of fruits and vegetables daily.     Eat whole-grain bread, whole-wheat pasta and brown rice instead of white grains and rice.     Get adequate Calcium and Vitamin D.     Lifestyle    Exercise for at least 150 minutes a week (30 minutes a day, 5 days a week). This will help you control your weight and prevent disease.     Limit alcohol to one drink per day.     No smoking.     Wear sunscreen to prevent skin cancer.     See your dentist every six months for an exam and cleaning.     See your eye doctor every 1 to 2 years.

## 2020-02-21 NOTE — PROGRESS NOTES
SUBJECTIVE:   CC: Latrell Al is an 54 year old male who presents for preventative health visit.     HPI        Musculoskeletal problem/pain      Duration: 6 months    Description    Location: left wrist and right wrist     Intensity:  moderate    Accompanying signs and symptoms: radiation of pain to shoulder, numbness, tingling and swelling    History  Previous similar problem: no   Previous evaluation:  none    Precipitating or alleviating factors:  Trauma or overuse: YES-  at work  Aggravating factors include: lifting, exercise and overuse    Therapies tried and outcome: heat, ice, immobilization, stretching, exercises, support wrap and acetaminophen      Today's PHQ-2 Score:   PHQ-2 (  [employer]) 2020   Q1: Little interest or pleasure in doing things 0   Q2: Feeling down, depressed or hopeless 0   PHQ-2 Score 0       Abuse: Current or Past(Physical, Sexual or Emotional)- No  Do you feel safe in your environment? Yes    Have you ever done Advance Care Planning? (For example, a Health Directive, POLST, or a discussion with a medical provider or your loved ones about your wishes): No, advance care planning information given to patient to review.  Patient plans to discuss their wishes with loved ones or provider.      Social History     Tobacco Use     Smoking status: Current Some Day Smoker     Years: 36.00     Types: Cigarettes, Cigars     Last attempt to quit: 2008     Years since quittin.1     Smokeless tobacco: Never Used     Tobacco comment: occ   Substance Use Topics     Alcohol use: Yes     Comment: 3-6 cans of beer per month or less     If you drink alcohol do you typically have >3 drinks per day or >7 drinks per week? No    Alcohol Use 2020   Prescreen: >3 drinks/day or >7 drinks/week? No       Last PSA:   PSA   Date Value Ref Range Status   02/15/2019 0.65 0 - 4 ug/L Final     Comment:     Assay Method:  Chemiluminescence using Siemens Vista analyzer       Reviewed orders  "with patient. Reviewed health maintenance and updated orders accordingly - Yes      Reviewed and updated as needed this visit by clinical staff  Tobacco  Allergies  Meds  Med Hx  Surg Hx  Fam Hx  Soc Hx        Reviewed and updated as needed this visit by Provider            Review of Systems  Review Of Systems  Skin: has a small mass on left wrist volar surface about 0.5 cm  Eyes: negative  Ears/Nose/Throat: slight hearing loss based on work audiology.  Respiratory: No shortness of breath, dyspnea on exertion, cough, or hemoptysis  Cardiovascular: negative  Gastrointestinal: negative  Genitourinary: negative  Musculoskeletal: negative  Neurologic: negative  Psychiatric: negative  Hematologic/Lymphatic/Immunologic: negative  Endocrine: negative      OBJECTIVE:   /88 (BP Location: Right arm, Patient Position: Chair, Cuff Size: Adult Large)   Pulse 59   Temp 97.6  F (36.4  C) (Tympanic)   Resp 16   Ht 1.746 m (5' 8.75\")   Wt 107.5 kg (237 lb)   SpO2 96%   BMI 35.25 kg/m      Physical Exam  GENERAL: healthy, alert and no distress  EYES: Eyes grossly normal to inspection, PERRL and conjunctivae and sclerae normal  HENT: ear canals and TM's normal, nose and mouth without ulcers or lesions  NECK: no adenopathy, no asymmetry, masses, or scars and thyroid normal to palpation  RESP: lungs clear to auscultation - no rales, rhonchi or wheezes  CV: regular rate and rhythm, normal S1 S2, no S3 or S4, no murmur, click or rub, no peripheral edema and peripheral pulses strong  ABDOMEN: soft, nontender, no hepatosplenomegaly, no masses and bowel sounds normal   (male): normal male genitalia without lesions or urethral discharge, no hernia  MS: no gross musculoskeletal defects noted, no edema  SKIN: no suspicious lesions or rashes  SKIN: has small rubbery mass volar of left wrist c/w lipoma.  Noted recurrent sebaceous cyst on his mid upper back.  NEURO: Normal strength and tone, mentation intact and speech " "normal  PSYCH: mentation appears normal, affect normal/bright  LYMPH: anterior cervical: no adenopathy  posterior cervical: no adenopathy        ASSESSMENT/PLAN:   (Z00.00) Routine general medical examination at a health care facility  (primary encounter diagnosis)  Comment: Discussed healthy lifestyle and preventative cares.    Plan:     (E66.01) Morbid obesity (H)  Comment: recommend to lose weight  Plan:     (N52.9) Erectile dysfunction, unspecified erectile dysfunction type  Comment: refilled med locally no side effects  Plan: sildenafil 100 MG PO tablet            (I25.10) Cardiovascular disease  Comment: refilled meds  Plan: atenolol 50 MG PO tablet, lisinopril 10 MG PO         tablet, nitroGLYcerin 0.4 MG SL sublingual         tablet, Basic metabolic panel, Lipid panel         reflex to direct LDL Fasting            (M25.531,  M25.532) Pain in both wrists  Comment: referral is done  Plan: Orthopedic & Spine  Referral            (E78.5) Hyperlipidemia LDL goal <70  Comment: refilled med and  Check lab  Plan: simvastatin 40 MG PO tablet, Lipid panel reflex        to direct LDL Fasting            (D17.30) Lipoma of skin and subcutaneous tissue  Comment: noted and reassured  Plan:     (Z12.5) Screening for prostate cancer  Comment:   Plan: Prostate spec antigen screen              COUNSELING:   Reviewed preventive health counseling, as reflected in patient instructions       Regular exercise       Healthy diet/nutrition       Vision screening       Hearing screening    Estimated body mass index is 35.25 kg/m  as calculated from the following:    Height as of this encounter: 1.746 m (5' 8.75\").    Weight as of this encounter: 107.5 kg (237 lb).     Weight management plan: diet and exercise     reports that he has been smoking cigarettes and cigars. He has smoked for the past 36.00 years. He has never used smokeless tobacco.  Tobacco Cessation Action Plan: Information offered: Patient not interested at " this time    Counseling Resources:  ATP IV Guidelines  Pooled Cohorts Equation Calculator  FRAX Risk Assessment  ICSI Preventive Guidelines  Dietary Guidelines for Americans, 2010  [email or web address]'s MyPlate  ASA Prophylaxis  Lung CA Screening    Gerald Roche MD  [facility]

## 2020-02-21 NOTE — PROGRESS NOTES
3  SUBJECTIVE:   CC: Latrell Al is an 54 year old male who presents for preventive health visit.     Healthy Habits:    Do you get at least three servings of calcium containing foods daily (dairy, green leafy vegetables, etc.)? yes    Amount of exercise or daily activities, outside of work: on and off    Problems taking medications regularly No    Medication side effects: No    Have you had an eye exam in the past two years? yes    Do you see a dentist twice per year? Once a year    Do you have sleep apnea, excessive snoring or daytime drowsiness?yes-snores      Hyperlipidemia Follow-Up      Are you regularly taking any medication or supplement to lower your cholesterol?   Yes- simvastatin    Are you having muscle aches or other side effects that you think could be caused by your cholesterol lowering medication?  No    Hypertension Follow-up      Do you check your blood pressure regularly outside of the clinic? No     Are you following a low salt diet? Yes    Are your blood pressures ever more than 140 on the top number (systolic) OR more   than 90 on the bottom number (diastolic), for example 140/90? NA    Today's PHQ-2 Score:   PHQ-2 (  [employer]) 2020 2/15/2019   Q1: Little interest or pleasure in doing things 0 0   Q2: Feeling down, depressed or hopeless 0 0   PHQ-2 Score 0 0       Abuse: Current or Past(Physical, Sexual or Emotional)- No  Do you feel safe in your environment? No    Have you ever done Advance Care Planning? (For example, a Health Directive, POLST, or a discussion with a medical provider or your loved ones about your wishes): No, advance care planning information given to patient to review.  Patient plans to discuss their wishes with loved ones or provider.      Social History     Tobacco Use     Smoking status: Current Some Day Smoker     Years: 36.00     Types: Cigarettes, Cigars     Last attempt to quit: 2008     Years since quittin.1     Smokeless tobacco: Never Used      "Tobacco comment: occ   Substance Use Topics     Alcohol use: Yes     Comment: 3-6 cans of beer per month or less     If you drink alcohol do you typically have >3 drinks per day or >7 drinks per week? No                      Last PSA:   PSA   Date Value Ref Range Status   02/15/2019 0.65 0 - 4 ug/L Final     Comment:     Assay Method:  Chemiluminescence using Siemens Vista analyzer       Reviewed orders with patient. Reviewed health maintenance and updated orders accordingly - Yes      Reviewed and updated as needed this visit by clinical staff  Tobacco  Allergies  Meds  Med Hx  Surg Hx  Fam Hx  Soc Hx        Reviewed and updated as needed this visit by Provider            ROS:      OBJECTIVE:   /88 (BP Location: Right arm, Patient Position: Chair, Cuff Size: Adult Large)   Pulse 59   Temp 97.6  F (36.4  C) (Tympanic)   Resp 16   Ht 1.746 m (5' 8.75\")   Wt 107.5 kg (237 lb)   SpO2 96%   BMI 35.25 kg/m    EXAM:          ASSESSMENT/PLAN:       COUNSELING:      Estimated body mass index is 35.25 kg/m  as calculated from the following:    Height as of this encounter: 1.746 m (5' 8.75\").    Weight as of this encounter: 107.5 kg (237 lb).         reports that he has been smoking cigarettes and cigars. He has smoked for the past 36.00 years. He has never used smokeless tobacco.  Counseling Resources:  ATP IV Guidelines  Pooled Cohorts Equation Calculator  FRAX Risk Assessment  ICSI Preventive Guidelines  Dietary Guidelines for Americans, 2010  USDA's MyPlate  ASA Prophylaxis  Lung CA Screening    Gerald Roche MD  [facility]  "

## 2020-02-28 ENCOUNTER — ANCILLARY PROCEDURE (OUTPATIENT)
Dept: GENERAL RADIOLOGY | Facility: CLINIC | Age: 55
End: 2020-02-28
Attending: PEDIATRICS
Payer: COMMERCIAL

## 2020-02-28 ENCOUNTER — ALLIED HEALTH/NURSE VISIT (OUTPATIENT)
Dept: FAMILY MEDICINE | Facility: CLINIC | Age: 55
End: 2020-02-28
Payer: COMMERCIAL

## 2020-02-28 ENCOUNTER — OFFICE VISIT (OUTPATIENT)
Dept: ORTHOPEDICS | Facility: CLINIC | Age: 55
End: 2020-02-28
Payer: COMMERCIAL

## 2020-02-28 VITALS
BODY MASS INDEX: 35.1 KG/M2 | HEIGHT: 69 IN | DIASTOLIC BLOOD PRESSURE: 75 MMHG | SYSTOLIC BLOOD PRESSURE: 129 MMHG | WEIGHT: 237 LBS

## 2020-02-28 DIAGNOSIS — M25.531 PAIN IN BOTH WRISTS: ICD-10-CM

## 2020-02-28 DIAGNOSIS — I25.10 CARDIOVASCULAR DISEASE: ICD-10-CM

## 2020-02-28 DIAGNOSIS — E78.5 HYPERLIPIDEMIA LDL GOAL <70: ICD-10-CM

## 2020-02-28 DIAGNOSIS — M25.532 PAIN IN BOTH WRISTS: ICD-10-CM

## 2020-02-28 DIAGNOSIS — R20.0 NUMBNESS AND TINGLING IN BOTH HANDS: Primary | ICD-10-CM

## 2020-02-28 DIAGNOSIS — N52.9 ERECTILE DYSFUNCTION, UNSPECIFIED ERECTILE DYSFUNCTION TYPE: ICD-10-CM

## 2020-02-28 DIAGNOSIS — R20.2 NUMBNESS AND TINGLING IN BOTH HANDS: Primary | ICD-10-CM

## 2020-02-28 PROCEDURE — 73110 X-RAY EXAM OF WRIST: CPT | Mod: LT

## 2020-02-28 PROCEDURE — 99204 OFFICE O/P NEW MOD 45 MIN: CPT | Performed by: PEDIATRICS

## 2020-02-28 PROCEDURE — 99207 ZZC NO CHARGE NURSE ONLY: CPT

## 2020-02-28 RX ORDER — SILDENAFIL 100 MG/1
100 TABLET, FILM COATED ORAL DAILY PRN
Qty: 6 TABLET | Refills: 11 | Status: SHIPPED | OUTPATIENT
Start: 2020-02-28 | End: 2021-03-10

## 2020-02-28 RX ORDER — LISINOPRIL 10 MG/1
10 TABLET ORAL DAILY
Qty: 90 TABLET | Refills: 3 | Status: SHIPPED | OUTPATIENT
Start: 2020-02-28 | End: 2021-03-10

## 2020-02-28 RX ORDER — ATENOLOL 50 MG/1
50 TABLET ORAL DAILY
Qty: 90 TABLET | Refills: 3 | Status: SHIPPED | OUTPATIENT
Start: 2020-02-28 | End: 2021-03-10

## 2020-02-28 RX ORDER — SIMVASTATIN 40 MG
40 TABLET ORAL AT BEDTIME
Qty: 90 TABLET | Refills: 3 | Status: SHIPPED | OUTPATIENT
Start: 2020-02-28 | End: 2021-03-10

## 2020-02-28 RX ORDER — NITROGLYCERIN 0.4 MG/1
0.4 TABLET SUBLINGUAL EVERY 5 MIN PRN
Qty: 25 TABLET | Refills: 3 | Status: SHIPPED | OUTPATIENT
Start: 2020-02-28 | End: 2021-03-10

## 2020-02-28 ASSESSMENT — MIFFLIN-ST. JEOR: SCORE: 1891.43

## 2020-02-28 NOTE — RESULT ENCOUNTER NOTE
These results were discussed during office visit.    Lucía Carmichael MD, CAQ  Primary Care Sports Medicine  [facility]

## 2020-02-28 NOTE — LETTER
2/28/2020         RE: Latrell Al  [address] [facility] [address]        Dear Colleague,    Thank you for referring your patient, Latrell Al, to the [facility]. Please see a copy of my visit note below.    Sports Medicine Clinic Visit    PCP: Gerald Roche    Latrell Al is a 54 year old male who is seen  in consultation at the request of  Gerald Roche M.D. presenting with bilateral wrist pain    Injury: He reports bilateral wrist pain for 6-7 months. He reports no injury. He has pain in the dorsal wrist and pain increases with reaching and gripping. He is getting some occasional numbness and tingling in her his hands especially with gripping. He is right hand dominate    Location of Pain: bilateral wrist  Duration of Pain: 6-7 month(s)  Rating of Pain at worst: 8/10  Rating of Pain Currently: 3/10  Symptoms are better with: Ice  Symptoms are worse with: gripping and reaching  Additional Features:   Positive: popping, grinding, paresthesias, numbness and weakness   Negative: swelling, bruising, catching, locking and instability  Other evaluation and/or treatments so far consists of: Heat  Prior History of related problems: nothing    Social History: [employer]    Review of Systems  Skin: no bruising, no swelling  Musculoskeletal: as above  Neurologic: yes numbness, paresthesias  Remainder of review of systems is negative including constitutional, CV, pulmonary, GI, except as noted in HPI or medical history.    Patient's current problem list, past medical and surgical history, and family history were reviewed.    Patient Active Problem List   Diagnosis     Cardiovascular disease     Essential hypertension     Hyperlipidemia LDL goal <70     GERD (gastroesophageal reflux disease)     ED (erectile dysfunction)     Right foot infection     Cellulitis of foot, right     Tobacco abuse     Obesity (BMI 35.0-39.9) with comorbidity (H)      "Lipoma of skin and subcutaneous tissue     Pain in both wrists     Past Medical History:   Diagnosis Date     Esophageal reflux      TOBACCO ABUSE-CONTINUOUS      Past Surgical History:   Procedure Laterality Date     COLONOSCOPY       COLONOSCOPY N/A 7/13/2015    Procedure: COLONOSCOPY;  Surgeon: Maycol Singer MD;  Location: [facility]     COLONOSCOPY N/A 7/30/2018    Procedure: COLONOSCOPY;  colonoscopy;  Surgeon: Latrell Proctor MD;  Location: [facility]     SURGICAL HISTORY OF -   12/10/2004    Right Knee Medial Meniscectomy     SURGICAL HISTORY OF -   9/4/2001    EGD     SURGICAL HISTORY OF -   2/7/1995    Partial medial meniscectomy posteromedial horn meniscus, right knee     Family History   Problem Relation Age of Onset     C.A.D. Father         MI 42     Cancer Father         kidney     Hypertension Mother      Diabetes Mother      Heart Disease Mother         CABG     Cancer Mother         lung     Breast Cancer Sister      Cancer Maternal Grandmother         skin     Cancer - colorectal No family hx of      Prostate Cancer No family hx of          Objective  /75   Ht 1.746 m (5' 8.75\")   Wt 107.5 kg (237 lb)   BMI 35.25 kg/m       GENERAL APPEARANCE: healthy, alert and no distress   GAIT: NORMAL  SKIN: no suspicious lesions or rashes  HEENT: Sclera clear, anicteric  CV: good peripheral pulses  RESP: Breathing not labored  NEURO: Normal strength and tone, mentation intact and speech normal  PSYCH:  mentation appears normal and affect normal/bright    Bilateral Wrist and Hand exam  Inspection:       No swelling, bruising or deformity bilateral    Tender:       Mild volar wrist    Non Tender:       Remainder of the Wrist and Hand bilateral    ROM:       Decreased flexion and extension of wrists - symmetric bilaterally    Strength:       5/5 strength in the muscles of the hand, wrist and forearm bilateral    Special Tests:        positive (+) Tinel's test left and       positive (+) Phalen's test " bilateral    Neurovascular:       2+ radial pulses bilaterally with brisk capillary refill and      normal sensation to light touch in the radial, median and ulnar nerve distributions    Radiology  I ordered, visualized and reviewed these images with the patient  Xr Wrist Bilateral G/e 3 Vw  Result Date: 2/28/2020  WRIST BILATERAL THREE OR MORE VIEWS  February 28, 2020 10:13 AM HISTORY: Pain in both wrists. COMPARISON: None.   IMPRESSION: Left wrist: Small cyst in the mid to distal scaphoid measuring approximately 0.7 cm. Joint spaces are well-preserved. There are mild marginal osteophytes involving the third finger DIP joint indicating degenerative change. No erosive changes. Right wrist: Small 0.4 cm cyst in the mid scaphoid. Small cyst in the third metatarsal head. Otherwise unremarkable. No erosive changes.    Assessment:  1. Numbness and tingling in both hands    2. Pain in both wrists      Discussed anatomy and pathophysiology of carpal tunnel. Discussed avoidance of exacerbating activities and use of braces.  We also discussed possible occupational therapy, EMG to evaluate.  Would consider referral pending clinical course.    Plan:  - Today's Plan of Care:  Bracing with activities    -We also discussed other future treatment options:  Referral to Occupational Hand Therapy - consider custom bracing  Consideration of EMG testing    Follow Up: 1 month and as needed    Concerning signs and symptoms were reviewed.  The patient expressed understanding of this management plan and all questions were answered at this time.    Thanks for the opportunity to participate in the care of this patient, I will keep you updated on their progress.    CC: Gerald Carmichael MD [facility]  Primary Care Sports Medicine  [facility]    Again, thank you for allowing me to participate in the care of your patient.        Sincerely,        Lucía Carmichael MD

## 2020-02-28 NOTE — PROGRESS NOTES
Sports Medicine Clinic Visit    PCP: Gerald Roche    Latrell Al is a 54 year old male who is seen  in consultation at the request of  Gerald Roche M.D. presenting with bilateral wrist pain    Injury: He reports bilateral wrist pain for 6-7 months. He reports no injury. He has pain in the dorsal wrist and pain increases with reaching and gripping. He is getting some occasional numbness and tingling in her his hands especially with gripping. He is right hand dominate    Location of Pain: bilateral wrist  Duration of Pain: 6-7 month(s)  Rating of Pain at worst: 8/10  Rating of Pain Currently: 3/10  Symptoms are better with: Ice  Symptoms are worse with: gripping and reaching  Additional Features:   Positive: popping, grinding, paresthesias, numbness and weakness   Negative: swelling, bruising, catching, locking and instability  Other evaluation and/or treatments so far consists of: Heat  Prior History of related problems: nothing    Social History:  at [employer]    Review of Systems  Skin: no bruising, no swelling  Musculoskeletal: as above  Neurologic: yes numbness, paresthesias  Remainder of review of systems is negative including constitutional, CV, pulmonary, GI, except as noted in HPI or medical history.    Patient's current problem list, past medical and surgical history, and family history were reviewed.    Patient Active Problem List   Diagnosis     Cardiovascular disease     Essential hypertension     Hyperlipidemia LDL goal <70     GERD (gastroesophageal reflux disease)     ED (erectile dysfunction)     Right foot infection     Cellulitis of foot, right     Tobacco abuse     Obesity (BMI 35.0-39.9) with comorbidity (H)     Lipoma of skin and subcutaneous tissue     Pain in both wrists     Past Medical History:   Diagnosis Date     Esophageal reflux      TOBACCO ABUSE-CONTINUOUS      Past Surgical History:   Procedure Laterality Date     COLONOSCOPY       COLONOSCOPY N/A 7/13/2015  "   Procedure: COLONOSCOPY;  Surgeon: Maycol Singer MD;  Location: [facility]     COLONOSCOPY N/A 7/30/2018    Procedure: COLONOSCOPY;  colonoscopy;  Surgeon: Latrell Proctor MD;  Location: [facility]     SURGICAL HISTORY OF -   12/10/2004    Right Knee Medial Meniscectomy     SURGICAL HISTORY OF -   9/4/2001    EGD     SURGICAL HISTORY OF -   2/7/1995    Partial medial meniscectomy posteromedial horn meniscus, right knee     Family History   Problem Relation Age of Onset     C.A.D. Father         MI 42     Cancer Father         kidney     Hypertension Mother      Diabetes Mother      Heart Disease Mother         CABG     Cancer Mother         lung     Breast Cancer Sister      Cancer Maternal Grandmother         skin     Cancer - colorectal No family hx of      Prostate Cancer No family hx of          Objective  /75   Ht 1.746 m (5' 8.75\")   Wt 107.5 kg (237 lb)   BMI 35.25 kg/m      GENERAL APPEARANCE: healthy, alert and no distress   GAIT: NORMAL  SKIN: no suspicious lesions or rashes  HEENT: Sclera clear, anicteric  CV: good peripheral pulses  RESP: Breathing not labored  NEURO: Normal strength and tone, mentation intact and speech normal  PSYCH:  mentation appears normal and affect normal/bright    Bilateral Wrist and Hand exam  Inspection:       No swelling, bruising or deformity bilateral    Tender:       Mild volar wrist    Non Tender:       Remainder of the Wrist and Hand bilateral    ROM:       Decreased flexion and extension of wrists - symmetric bilaterally    Strength:       5/5 strength in the muscles of the hand, wrist and forearm bilateral    Special Tests:        positive (+) Tinel's test left and       positive (+) Phalen's test bilateral    Neurovascular:       2+ radial pulses bilaterally with brisk capillary refill and      normal sensation to light touch in the radial, median and ulnar nerve distributions    Radiology  I ordered, visualized and reviewed these images with the patient  Xr " Wrist Bilateral G/e 3 Vw  Result Date: 2/28/2020  WRIST BILATERAL THREE OR MORE VIEWS  February 28, 2020 10:13 AM HISTORY: Pain in both wrists. COMPARISON: None.   IMPRESSION: Left wrist: Small cyst in the mid to distal scaphoid measuring approximately 0.7 cm. Joint spaces are well-preserved. There are mild marginal osteophytes involving the third finger DIP joint indicating degenerative change. No erosive changes. Right wrist: Small 0.4 cm cyst in the mid scaphoid. Small cyst in the third metatarsal head. Otherwise unremarkable. No erosive changes.    Assessment:  1. Numbness and tingling in both hands    2. Pain in both wrists      Discussed anatomy and pathophysiology of carpal tunnel. Discussed avoidance of exacerbating activities and use of braces.  We also discussed possible occupational therapy, EMG to evaluate.  Would consider referral pending clinical course.    Plan:  - Today's Plan of Care:  Bracing with activities    -We also discussed other future treatment options:  Referral to Occupational Hand Therapy - consider custom bracing  Consideration of EMG testing    Follow Up: 1 month and as needed    Concerning signs and symptoms were reviewed.  The patient expressed understanding of this management plan and all questions were answered at this time.    Thanks for the opportunity to participate in the care of this patient, I will keep you updated on their progress.    CC: Gerald Carmichael MD [facility]  Primary Care Sports Medicine  [facility]

## 2020-02-28 NOTE — NURSING NOTE
Patient was fitted for left and right wrist braces in Clinic. He opted to take both brace after trying them on.  Bradley Cerna ATC

## 2020-02-28 NOTE — PATIENT INSTRUCTIONS
Plan:  - Today's Plan of Care:  Bracing with activities    -We also discussed other future treatment options:  Referral to Occupational Hand Therapy - consider custom bracing  Consideration of EMG testing    Follow Up: 1 month and as needed    If you have any further questions for your physician or physician s care team you can call [phone] and use option 3 to leave a voice message. Calls received during business hours will be returned same day.

## 2020-02-28 NOTE — PROGRESS NOTES
Patient needing most recent refills sent to [facility]. This was completed.      SHARON LoydN, RN

## 2020-11-11 NOTE — PLAN OF CARE
Problem: Patient Care Overview  Goal: Plan of Care/Patient Progress Review  Outcome: Adequate for Discharge Date Met: 12/20/17  [facility] DISCHARGE NOTE    Patient discharged to home at 6:00 PM via wheel chair. Accompanied by spouse and staff. Discharge instructions reviewed with patient and spouse, opportunity offered to ask questions. Prescriptions sent to patients preferred pharmacy. All belongings sent with patient.  Instructed pt's wife on wound cares and dressing change prior to discharge.   Tara Aleman       No

## 2020-11-16 ENCOUNTER — HEALTH MAINTENANCE LETTER (OUTPATIENT)
Age: 55
End: 2020-11-16

## 2021-03-10 ENCOUNTER — OFFICE VISIT (OUTPATIENT)
Dept: FAMILY MEDICINE | Facility: CLINIC | Age: 56
End: 2021-03-10
Payer: COMMERCIAL

## 2021-03-10 VITALS
OXYGEN SATURATION: 97 % | SYSTOLIC BLOOD PRESSURE: 128 MMHG | TEMPERATURE: 96.9 F | RESPIRATION RATE: 18 BRPM | DIASTOLIC BLOOD PRESSURE: 68 MMHG | WEIGHT: 236.6 LBS | BODY MASS INDEX: 35.86 KG/M2 | HEIGHT: 68 IN | HEART RATE: 61 BPM

## 2021-03-10 DIAGNOSIS — I25.10 CARDIOVASCULAR DISEASE: ICD-10-CM

## 2021-03-10 DIAGNOSIS — N52.9 ERECTILE DYSFUNCTION, UNSPECIFIED ERECTILE DYSFUNCTION TYPE: ICD-10-CM

## 2021-03-10 DIAGNOSIS — Z12.5 SCREENING FOR PROSTATE CANCER: ICD-10-CM

## 2021-03-10 DIAGNOSIS — Z00.00 ROUTINE GENERAL MEDICAL EXAMINATION AT A HEALTH CARE FACILITY: Primary | ICD-10-CM

## 2021-03-10 DIAGNOSIS — E66.01 MORBID OBESITY (H): ICD-10-CM

## 2021-03-10 DIAGNOSIS — Z72.0 TOBACCO ABUSE: ICD-10-CM

## 2021-03-10 DIAGNOSIS — E78.5 HYPERLIPIDEMIA LDL GOAL <70: ICD-10-CM

## 2021-03-10 LAB
ANION GAP SERPL CALCULATED.3IONS-SCNC: 4 MMOL/L (ref 3–14)
BUN SERPL-MCNC: 20 MG/DL (ref 7–30)
CALCIUM SERPL-MCNC: 9.2 MG/DL (ref 8.5–10.1)
CHLORIDE SERPL-SCNC: 102 MMOL/L (ref 94–109)
CHOLEST SERPL-MCNC: 144 MG/DL
CO2 SERPL-SCNC: 33 MMOL/L (ref 20–32)
CREAT SERPL-MCNC: 1.04 MG/DL (ref 0.66–1.25)
GFR SERPL CREATININE-BSD FRML MDRD: 79 ML/MIN/{1.73_M2}
GLUCOSE SERPL-MCNC: 87 MG/DL (ref 70–99)
HDLC SERPL-MCNC: 45 MG/DL
LDLC SERPL CALC-MCNC: 81 MG/DL
NONHDLC SERPL-MCNC: 99 MG/DL
POTASSIUM SERPL-SCNC: 4.2 MMOL/L (ref 3.4–5.3)
PSA SERPL-ACNC: 0.56 UG/L (ref 0–4)
SODIUM SERPL-SCNC: 139 MMOL/L (ref 133–144)
TRIGL SERPL-MCNC: 91 MG/DL

## 2021-03-10 PROCEDURE — 80048 BASIC METABOLIC PNL TOTAL CA: CPT | Performed by: FAMILY MEDICINE

## 2021-03-10 PROCEDURE — 36415 COLL VENOUS BLD VENIPUNCTURE: CPT | Performed by: FAMILY MEDICINE

## 2021-03-10 PROCEDURE — 80061 LIPID PANEL: CPT | Performed by: FAMILY MEDICINE

## 2021-03-10 PROCEDURE — 99213 OFFICE O/P EST LOW 20 MIN: CPT | Mod: 25 | Performed by: FAMILY MEDICINE

## 2021-03-10 PROCEDURE — G0103 PSA SCREENING: HCPCS | Performed by: FAMILY MEDICINE

## 2021-03-10 PROCEDURE — 99396 PREV VISIT EST AGE 40-64: CPT | Performed by: FAMILY MEDICINE

## 2021-03-10 RX ORDER — ATENOLOL 50 MG/1
50 TABLET ORAL DAILY
Qty: 90 TABLET | Refills: 3 | Status: SHIPPED | OUTPATIENT
Start: 2021-03-10 | End: 2022-03-25

## 2021-03-10 RX ORDER — NITROGLYCERIN 0.4 MG/1
0.4 TABLET SUBLINGUAL EVERY 5 MIN PRN
Qty: 25 TABLET | Refills: 3 | Status: SHIPPED | OUTPATIENT
Start: 2021-03-10 | End: 2022-03-25

## 2021-03-10 RX ORDER — SILDENAFIL 100 MG/1
100 TABLET, FILM COATED ORAL DAILY PRN
Qty: 6 TABLET | Refills: 11 | Status: SHIPPED | OUTPATIENT
Start: 2021-03-10 | End: 2022-03-25

## 2021-03-10 RX ORDER — SIMVASTATIN 40 MG
40 TABLET ORAL AT BEDTIME
Qty: 90 TABLET | Refills: 3 | Status: SHIPPED | OUTPATIENT
Start: 2021-03-10 | End: 2022-03-25

## 2021-03-10 RX ORDER — LISINOPRIL 10 MG/1
10 TABLET ORAL DAILY
Qty: 90 TABLET | Refills: 3 | Status: SHIPPED | OUTPATIENT
Start: 2021-03-10 | End: 2022-03-25

## 2021-03-10 ASSESSMENT — MIFFLIN-ST. JEOR: SCORE: 1877.58

## 2021-03-10 ASSESSMENT — PAIN SCALES - GENERAL: PAINLEVEL: NO PAIN (0)

## 2021-03-10 NOTE — PROGRESS NOTES
3  SUBJECTIVE:   CC: Latrell Al is an 55 year old male who presents for preventive health visit.       Patient has been advised of split billing requirements and indicates understanding: Yes  Healthy Habits:    Do you get at least three servings of calcium containing foods daily (dairy, green leafy vegetables, etc.)? yes    Amount of exercise or daily activities, outside of work: 3 day(s) per week    Problems taking medications regularly No    Medication side effects: No    Have you had an eye exam in the past two years? yes    Do you see a dentist twice per year? no    Do you have sleep apnea, excessive snoring or daytime drowsiness?no      He also needs to have medications refills.  No side effect of his medication of Viagra for ED.  He is taking his medication for his cholesterol and no side effects.  He is on medication for his heart and no side effect.    Today's PHQ-2 Score:   PHQ-2 (  [employer]) 3/10/2021 2020   Q1: Little interest or pleasure in doing things 0 0   Q2: Feeling down, depressed or hopeless 0 0   PHQ-2 Score 0 0       Abuse: Current or Past(Physical, Sexual or Emotional)- No  Do you feel safe in your environment? Yes        Social History     Tobacco Use     Smoking status: Current Some Day Smoker     Years: 36.00     Types: Cigarettes, Cigars     Last attempt to quit: 2008     Years since quittin.1     Smokeless tobacco: Never Used     Tobacco comment: occ   Substance Use Topics     Alcohol use: Yes     Comment: 3-6 cans of beer per month or less     If you drink alcohol do you typically have >3 drinks per day or >7 drinks per week? No                      Last PSA:   PSA   Date Value Ref Range Status   2020 0.66 0 - 4 ug/L Final     Comment:     Assay Method:  Chemiluminescence using Siemens Vista analyzer       Reviewed orders with patient. Reviewed health maintenance and updated orders accordingly - Yes      Reviewed and updated as needed this visit by clinical  "staff  Tobacco  Allergies  Meds  Problems  Med Hx  Surg Hx  Fam Hx  Soc Hx          Reviewed and updated as needed this visit by Provider  Tobacco  Allergies  Meds  Problems  Med Hx  Surg Hx  Fam Hx             ROS:  Review Of Systems  Skin: negative  Eyes: negative  Ears/Nose/Throat: negative  Respiratory: No shortness of breath, dyspnea on exertion, cough, or hemoptysis  Cardiovascular: negative  Gastrointestinal: negative  Genitourinary: negative  Musculoskeletal: negative  Neurologic: negative  Psychiatric: negative  Hematologic/Lymphatic/Immunologic: negative  Endocrine: negative      OBJECTIVE:   /68 (BP Location: Left arm, Patient Position: Sitting, Cuff Size: Adult Large)   Pulse 61   Temp 96.9  F (36.1  C) (Tympanic)   Resp 18   Ht 1.735 m (5' 8.31\")   Wt 107.3 kg (236 lb 9.6 oz)   SpO2 97%   BMI 35.65 kg/m    EXAM:  GENERAL: healthy, alert and no distress  EYES: Eyes grossly normal to inspection, PERRL and conjunctivae and sclerae normal  HENT: ear canals and TM's normal, nose and mouth without ulcers or lesions  NECK: no adenopathy, no asymmetry, masses, or scars and thyroid normal to palpation  RESP: lungs clear to auscultation - no rales, rhonchi or wheezes  CV: regular rate and rhythm, normal S1 S2, no S3 or S4, no murmur, click or rub, no peripheral edema and peripheral pulses strong  ABDOMEN: soft, nontender, no hepatosplenomegaly, no masses and bowel sounds normal   (male): normal male genitalia without lesions or urethral discharge, no hernia  MS: no gross musculoskeletal defects noted, no edema  SKIN: no suspicious lesions or rashes  NEURO: Normal strength and tone, mentation intact and speech normal  PSYCH: mentation appears normal, affect normal/bright  LYMPH: anterior cervical: no adenopathy  posterior cervical: no adenopathy        ASSESSMENT/PLAN:   (Z00.00) Routine general medical examination at a health care facility  (primary encounter diagnosis)  Comment: " "Discussed healthy lifestyle and preventative cares.    Plan:     (E66.01) Morbid obesity (H)  Comment: work on weight loss  Plan:     (I25.10) Cardiovascular disease  Comment: stable and refilled med  Plan: atenolol (TENORMIN) 50 MG tablet, lisinopril         (ZESTRIL) 10 MG tablet, nitroGLYcerin         (NITROSTAT) 0.4 MG sublingual tablet,         OFFICE/OUTPT VISIT,EST,LEVL III, Basic         metabolic panel            (N52.9) Erectile dysfunction, unspecified erectile dysfunction type  Comment: using med no side effects  Plan: sildenafil (VIAGRA) 100 MG tablet, OFFICE/OUTPT        VISIT,EST,LEVL III            (E78.5) Hyperlipidemia LDL goal <70  Comment: check lab and refill med  Plan: simvastatin (ZOCOR) 40 MG tablet, OFFICE/OUTPT         VISIT,EST,LEVL III, Lipid panel reflex to         direct LDL Fasting            (Z12.5) Screening for prostate cancer  Comment:   Plan: Prostate spec antigen screen              Patient has been advised of split billing requirements and indicates understanding: Yes  COUNSELING:  Reviewed preventive health counseling, as reflected in patient instructions       Regular exercise       Healthy diet/nutrition       Prostate cancer screening    Estimated body mass index is 35.65 kg/m  as calculated from the following:    Height as of this encounter: 1.735 m (5' 8.31\").    Weight as of this encounter: 107.3 kg (236 lb 9.6 oz).    Weight management plan: diet and activity    He reports that he has been smoking cigarettes and cigars. He has smoked for the past 36.00 years. He has never used smokeless tobacco.  Tobacco Cessation Action Plan:   Information offered: Patient not interested at this time  Recommend to quit smoking    Counseling Resources:  ATP IV Guidelines  Pooled Cohorts Equation Calculator  FRAX Risk Assessment  ICSI Preventive Guidelines  Dietary Guidelines for Americans, 2010  [employer]'s MyPlate  ASA Prophylaxis  Lung CA Screening    Gerald Roche MD  [facility] " [facility]

## 2021-03-10 NOTE — PATIENT INSTRUCTIONS
Please go to lab.    I refilled your medications for the year.  I recommend to work on weight loss by losing 15-20# over the next year.    You are doing well.    I do recommend to quit all forms of nicotine use    If the pharmacy was not correct please let us know.  I sent to the [facility] for your 3 medications.    Please be aware that there will be an additional charge during your preventative visit due to either a new diagnosis and/or chronic disease management.    Preventative visits screen for diseases prior to they occur.  They do not cover for any new diagnosis or chronic disease management which would include medication refills, labs etc.    If you have questions regarding your coverage please check with your insurance provider.  At [facility] we need to code correctly to be in compliance with all insurance companies.          Thank you for choosing [facility].  You may be receiving an email and/or telephone survey request from [facility] Customer Experience regarding your visit today.  Please take a few minutes to respond to the survey to let us know how we are doing.      If you have questions or concerns, please contact us via [employer] or you can contact your care team at [phone].    Our Clinic hours are:  Monday 6:40 am  to 7:00 pm  Tuesday -Friday 6:40 am to 5:00 pm    The [place] outpatient lab hours are:  Monday - Friday 6:10 am to 4:45 pm  Saturdays 7:00 am to 11:00 am  Appointments are required, call [phone]    If you have clinical questions after hours or would like to schedule an appointment,  call the clinic at [phone].    Preventive Health Recommendations  Male Ages 50 - 64    Yearly exam:             See your health care provider every year in order to  o   Review health changes.   o   Discuss preventive care.    o   Review your medicines if your doctor has prescribed any.     Have a cholesterol test every 5 years, or more frequently if you are at risk for high  cholesterol/heart disease.     Have a diabetes test (fasting glucose) every three years. If you are at risk for diabetes, you should have this test more often.     Have a colonoscopy at age 50, or have a yearly FIT test (stool test). These exams will check for colon cancer.      Talk with your health care provider about whether or not a prostate cancer screening test (PSA) is right for you.    You should be tested each year for STDs (sexually transmitted diseases), if you re at risk.     Shots: Get a flu shot each year. Get a tetanus shot every 10 years.     Nutrition:    Eat at least 5 servings of fruits and vegetables daily.     Eat whole-grain bread, whole-wheat pasta and brown rice instead of white grains and rice.     Get adequate Calcium and Vitamin D.     Lifestyle    Exercise for at least 150 minutes a week (30 minutes a day, 5 days a week). This will help you control your weight and prevent disease.     Limit alcohol to one drink per day.     No smoking.     Wear sunscreen to prevent skin cancer.     See your dentist every six months for an exam and cleaning.     See your eye doctor every 1 to 2 years.

## 2021-03-10 NOTE — LETTER
March 12, 2021      Latrell Al  [address] [address] [facility] [address]        Dear ,    We are writing to inform you of your test results.    You have good cholesterol.   You have no signs of any prostate cancer.   You have normal kidney function.     Resulted Orders   Basic metabolic panel   Result Value Ref Range    Sodium 139 133 - 144 mmol/L    Potassium 4.2 3.4 - 5.3 mmol/L    Chloride 102 94 - 109 mmol/L    Carbon Dioxide 33 (H) 20 - 32 mmol/L    Anion Gap 4 3 - 14 mmol/L    Glucose 87 70 - 99 mg/dL    Urea Nitrogen 20 7 - 30 mg/dL    Creatinine 1.04 0.66 - 1.25 mg/dL    GFR Estimate 79 >60 mL/min/[1.73_m2]      Comment:      Non  GFR Calc  Starting 12/18/2018, serum creatinine based estimated GFR (eGFR) will be   calculated using the Chronic Kidney Disease Epidemiology Collaboration   (CKD-EPI) equation.      GFR Estimate If Black >90 >60 mL/min/[1.73_m2]      Comment:       GFR Calc  Starting 12/18/2018, serum creatinine based estimated GFR (eGFR) will be   calculated using the Chronic Kidney Disease Epidemiology Collaboration   (CKD-EPI) equation.      Calcium 9.2 8.5 - 10.1 mg/dL   Prostate spec antigen screen   Result Value Ref Range    PSA 0.56 0 - 4 ug/L      Comment:      Assay Method:  Chemiluminescence using Siemens Vista analyzer   Lipid panel reflex to direct LDL Fasting   Result Value Ref Range    Cholesterol 144 <200 mg/dL    Triglycerides 91 <150 mg/dL    HDL Cholesterol 45 >39 mg/dL    LDL Cholesterol Calculated 81 <100 mg/dL      Comment:      Desirable:       <100 mg/dl    Non HDL Cholesterol 99 <130 mg/dL       If you have any questions or concerns, please call the clinic at the number listed above.       Sincerely,      Gerald Roche MD

## 2021-06-05 ENCOUNTER — HOSPITAL ENCOUNTER (EMERGENCY)
Facility: CLINIC | Age: 56
Discharge: HOME | End: 2021-06-05
Attending: PHYSICIAN ASSISTANT | Admitting: PHYSICIAN ASSISTANT
Payer: COMMERCIAL

## 2021-06-05 VITALS
OXYGEN SATURATION: 95 % | BODY MASS INDEX: 34.5 KG/M2 | HEART RATE: 68 BPM | WEIGHT: 241 LBS | DIASTOLIC BLOOD PRESSURE: 88 MMHG | RESPIRATION RATE: 18 BRPM | HEIGHT: 70 IN | SYSTOLIC BLOOD PRESSURE: 130 MMHG | TEMPERATURE: 98 F

## 2021-06-05 DIAGNOSIS — L08.9 INFECTED EPIDERMOID CYST: ICD-10-CM

## 2021-06-05 DIAGNOSIS — L72.0 INFECTED EPIDERMOID CYST: ICD-10-CM

## 2021-06-05 LAB
ANION GAP SERPL CALCULATED.3IONS-SCNC: 3 MMOL/L (ref 3–14)
BASOPHILS # BLD AUTO: 0.1 10E9/L (ref 0–0.2)
BASOPHILS NFR BLD AUTO: 0.5 %
BUN SERPL-MCNC: 18 MG/DL (ref 7–30)
CALCIUM SERPL-MCNC: 8.7 MG/DL (ref 8.5–10.1)
CHLORIDE SERPL-SCNC: 108 MMOL/L (ref 94–109)
CO2 SERPL-SCNC: 30 MMOL/L (ref 20–32)
CREAT SERPL-MCNC: 0.98 MG/DL (ref 0.66–1.25)
DIFFERENTIAL METHOD BLD: ABNORMAL
EOSINOPHIL # BLD AUTO: 0.2 10E9/L (ref 0–0.7)
EOSINOPHIL NFR BLD AUTO: 1.8 %
ERYTHROCYTE [DISTWIDTH] IN BLOOD BY AUTOMATED COUNT: 13.7 % (ref 10–15)
GFR SERPL CREATININE-BSD FRML MDRD: 85 ML/MIN/{1.73_M2}
GLUCOSE SERPL-MCNC: 98 MG/DL (ref 70–99)
HCT VFR BLD AUTO: 46.5 % (ref 40–53)
HGB BLD-MCNC: 15.4 G/DL (ref 13.3–17.7)
IMM GRANULOCYTES # BLD: 0 10E9/L (ref 0–0.4)
IMM GRANULOCYTES NFR BLD: 0.3 %
LYMPHOCYTES # BLD AUTO: 2.7 10E9/L (ref 0.8–5.3)
LYMPHOCYTES NFR BLD AUTO: 23.5 %
MCH RBC QN AUTO: 29.9 PG (ref 26.5–33)
MCHC RBC AUTO-ENTMCNC: 33.1 G/DL (ref 31.5–36.5)
MCV RBC AUTO: 90 FL (ref 78–100)
MONOCYTES # BLD AUTO: 0.8 10E9/L (ref 0–1.3)
MONOCYTES NFR BLD AUTO: 7.1 %
NEUTROPHILS # BLD AUTO: 7.7 10E9/L (ref 1.6–8.3)
NEUTROPHILS NFR BLD AUTO: 66.8 %
NRBC # BLD AUTO: 0 10*3/UL
NRBC BLD AUTO-RTO: 0 /100
PLATELET # BLD AUTO: 217 10E9/L (ref 150–450)
POTASSIUM SERPL-SCNC: 4.1 MMOL/L (ref 3.4–5.3)
RBC # BLD AUTO: 5.15 10E12/L (ref 4.4–5.9)
SODIUM SERPL-SCNC: 141 MMOL/L (ref 133–144)
WBC # BLD AUTO: 11.6 10E9/L (ref 4–11)

## 2021-06-05 PROCEDURE — 10060 I&D ABSCESS SIMPLE/SINGLE: CPT | Performed by: PHYSICIAN ASSISTANT

## 2021-06-05 PROCEDURE — 80048 BASIC METABOLIC PNL TOTAL CA: CPT | Performed by: PHYSICIAN ASSISTANT

## 2021-06-05 PROCEDURE — 99284 EMERGENCY DEPT VISIT MOD MDM: CPT | Mod: 25 | Performed by: PHYSICIAN ASSISTANT

## 2021-06-05 PROCEDURE — 85025 COMPLETE CBC W/AUTO DIFF WBC: CPT | Performed by: PHYSICIAN ASSISTANT

## 2021-06-05 PROCEDURE — 99283 EMERGENCY DEPT VISIT LOW MDM: CPT | Mod: 25 | Performed by: PHYSICIAN ASSISTANT

## 2021-06-05 ASSESSMENT — ENCOUNTER SYMPTOMS
NEUROLOGICAL NEGATIVE: 1
CARDIOVASCULAR NEGATIVE: 1
DIARRHEA: 0
CONSTIPATION: 0
ABDOMINAL PAIN: 0
MUSCULOSKELETAL NEGATIVE: 1
FATIGUE: 0
RESPIRATORY NEGATIVE: 1
WOUND: 1
CHILLS: 0
APPETITE CHANGE: 1
NAUSEA: 0
ACTIVITY CHANGE: 0
FEVER: 0
VOMITING: 0
GASTROINTESTINAL NEGATIVE: 1
PSYCHIATRIC NEGATIVE: 1

## 2021-06-05 ASSESSMENT — MIFFLIN-ST. JEOR: SCORE: 1924.42

## 2021-06-05 NOTE — DISCHARGE INSTRUCTIONS
Use medication as directed.     Warm compresses and sitz baths to area.     Can follow up with general surgery for further evaluation or treatment.     Follow up with primary care provider or general surgery in 3 days for recheck of cellulitis.   Keep area clean.    Return to the Emergency Room if fevers, chills, increased redness, rectal pain, abdominal pain, or change in symptoms occur.

## 2021-06-05 NOTE — ED PROVIDER NOTES
History     Chief Complaint   Patient presents with     Cyst     pilonidal     HPI  Latrell Al is a 55 year old male who presents today with infected cyst. Patient states about 3 years ago he noticed a small lump to the left cheek about a month after having a colonoscopy done.  About 2 inches away from the rectum on the buttocks.  Patient states about a month ago he noticed that the lump has increased in size and last week it started to become painful and draining on its own.  He states that there was pus and bloody malodorous drainage that has since continued.  Patient states now that there is some surrounding redness.  He rates the pain a 5 out of 10 on pain scale.  Patient denies any fevers, chills, rectal pain, abdominal pain, nausea or vomiting, urinary symptoms.  He states that he has had a slight decrease in appetite yesterday.  Patient has not been doing anything for the area and keeping it clean and bandaged.  Patient denies history of MRSA.    Allergies:  No Known Allergies    Problem List:    Patient Active Problem List    Diagnosis Date Noted     Lipoma of skin and subcutaneous tissue 02/21/2020     Priority: Medium     Pain in both wrists 02/21/2020     Priority: Medium     Obesity (BMI 35.0-39.9) with comorbidity (H) 02/15/2019     Priority: Medium     Tobacco abuse 06/11/2018     Priority: Medium     Cellulitis of foot, right 12/16/2017     Priority: Medium     Right foot infection 12/15/2017     Priority: Medium     ED (erectile dysfunction) 04/22/2013     Priority: Medium     GERD (gastroesophageal reflux disease) 04/25/2011     Priority: Medium     Hyperlipidemia LDL goal <70 10/26/2009     Priority: Medium     Essential hypertension 12/29/2005     Priority: Medium     Problem list name updated by automated process. Provider to review       Cardiovascular disease 10/11/2005     Priority: Medium     Patient had a partial blockage noted at age 39  Problem list name updated by automated  process. Provider to review          Past Medical History:    Past Medical History:   Diagnosis Date     Esophageal reflux      TOBACCO ABUSE-CONTINUOUS        Past Surgical History:    Past Surgical History:   Procedure Laterality Date     COLONOSCOPY       COLONOSCOPY N/A 2015    Procedure: COLONOSCOPY;  Surgeon: Maycol Singer MD;  Location: [facility]     COLONOSCOPY N/A 2018    Procedure: COLONOSCOPY;  colonoscopy;  Surgeon: Latrell Proctor MD;  Location: [facility]     SURGICAL HISTORY OF -   12/10/2004    Right Knee Medial Meniscectomy     SURGICAL HISTORY OF -   2001    EGD     SURGICAL HISTORY OF -   1995    Partial medial meniscectomy posteromedial horn meniscus, right knee       Family History:    Family History   Problem Relation Age of Onset     C.A.D. Father         MI 42     Cancer Father         kidney     Hypertension Mother      Diabetes Mother      Heart Disease Mother         CABG     Cancer Mother         lung     Breast Cancer Sister      Cancer Maternal Grandmother         skin     Cancer - colorectal No family hx of      Prostate Cancer No family hx of        Social History:  Marital Status:   [2]  Social History     Tobacco Use     Smoking status: Current Some Day Smoker     Years: 36.00     Types: Cigarettes, Cigars     Last attempt to quit: 2008     Years since quittin.4     Smokeless tobacco: Never Used     Tobacco comment: occ   Substance Use Topics     Alcohol use: Yes     Comment: 3-6 cans of beer per month or less     Drug use: No        Medications:    amoxicillin-clavulanate (AUGMENTIN) 875-125 MG tablet  ASPIRIN 81 MG OR TABS  atenolol (TENORMIN) 50 MG tablet  lisinopril (ZESTRIL) 10 MG tablet  nitroGLYcerin (NITROSTAT) 0.4 MG sublingual tablet  order for DME  sildenafil (VIAGRA) 100 MG tablet  simvastatin (ZOCOR) 40 MG tablet          Review of Systems   Constitutional: Positive for appetite change. Negative for activity change, chills, fatigue and  "fever.   HENT: Negative.    Respiratory: Negative.    Cardiovascular: Negative.    Gastrointestinal: Negative.  Negative for abdominal pain, constipation, diarrhea, nausea and vomiting.   Genitourinary: Negative.    Musculoskeletal: Negative.    Skin: Positive for wound (draining cyst to the left buttock ).   Neurological: Negative.    Psychiatric/Behavioral: Negative.    All other systems reviewed and are negative.      Physical Exam   BP: 130/88  Pulse: 68  Temp: 98  F (36.7  C)  Resp: 18  Height: 177.8 cm (5' 10\")  Weight: 109.3 kg (241 lb)  SpO2: 95 %      Physical Exam  Vitals signs and nursing note reviewed. Exam conducted with a chaperone present.   Constitutional:       General: He is not in acute distress.     Appearance: Normal appearance. He is normal weight. He is not ill-appearing or toxic-appearing.   Genitourinary:     Rectum: No tenderness or external hemorrhoid. Normal anal tone.       Skin:     General: Skin is warm.      Capillary Refill: Capillary refill takes less than 2 seconds.      Findings: No bruising, erythema or rash.   Neurological:      General: No focal deficit present.      Mental Status: He is alert and oriented to person, place, and time.      Sensory: No sensory deficit.      Motor: No weakness.      Gait: Gait normal.   Psychiatric:         Mood and Affect: Mood normal.         Behavior: Behavior normal.         Thought Content: Thought content normal.         Judgment: Judgment normal.         ED [facility]    PROCEDURE: -Incision/Drainage    Date/Time: 6/5/2021 5:15 PM  Performed by: Sarah Zayas PA-C  Authorized by: Sarah Zayas PA-C       LOCATION:      Type:  Cyst    Size:  1.5cm    Location:  Anogenital    Anogenital location: left buttock of gluteal cleft about 2.5 inches away from rectum.    PRE-PROCEDURE DETAILS:     Skin preparation:  Betadine (alcohol swabs )    ANESTHESIA (see MAR for exact dosages):     " Anesthesia method:  Local infiltration    Local anesthetic:  Bupivacaine 0.25% w/o epi    PROCEDURE TYPE:     Complexity:  Simple    PROCEDURE DETAILS:     Incision types:  Single straight    Incision depth:  Dermal    Scalpel blade:  11    Drainage:  Bloody (thick whitish epidermoid discharge )    Drainage amount:  Scant    Wound treatment:  Wound left open    Packing materials:  None  Post-procedure details:     PROCEDURE   Patient Tolerance:  Patient tolerated the procedure well with no immediate complications                    Critical Care time:  none               Results for orders placed or performed during the hospital encounter of 06/05/21 (from the past 24 hour(s))   CBC with platelets differential   Result Value Ref Range    WBC 11.6 (H) 4.0 - 11.0 10e9/L    RBC Count 5.15 4.4 - 5.9 10e12/L    Hemoglobin 15.4 13.3 - 17.7 g/dL    Hematocrit 46.5 40.0 - 53.0 %    MCV 90 78 - 100 fl    MCH 29.9 26.5 - 33.0 pg    MCHC 33.1 31.5 - 36.5 g/dL    RDW 13.7 10.0 - 15.0 %    Platelet Count 217 150 - 450 10e9/L    Diff Method Automated Method     % Neutrophils 66.8 %    % Lymphocytes 23.5 %    % Monocytes 7.1 %    % Eosinophils 1.8 %    % Basophils 0.5 %    % Immature Granulocytes 0.3 %    Nucleated RBCs 0 0 /100    Absolute Neutrophil 7.7 1.6 - 8.3 10e9/L    Absolute Lymphocytes 2.7 0.8 - 5.3 10e9/L    Absolute Monocytes 0.8 0.0 - 1.3 10e9/L    Absolute Eosinophils 0.2 0.0 - 0.7 10e9/L    Absolute Basophils 0.1 0.0 - 0.2 10e9/L    Abs Immature Granulocytes 0.0 0 - 0.4 10e9/L    Absolute Nucleated RBC 0.0    Basic metabolic panel   Result Value Ref Range    Sodium 141 133 - 144 mmol/L    Potassium 4.1 3.4 - 5.3 mmol/L    Chloride 108 94 - 109 mmol/L    Carbon Dioxide 30 20 - 32 mmol/L    Anion Gap 3 3 - 14 mmol/L    Glucose 98 70 - 99 mg/dL    Urea Nitrogen 18 7 - 30 mg/dL    Creatinine 0.98 0.66 - 1.25 mg/dL    GFR Estimate 85 >60 mL/min/[1.73_m2]    GFR Estimate If Black >90 >60 mL/min/[1.73_m2]    Calcium 8.7  8.5 - 10.1 mg/dL       Medications - No data to display    Assessments & Plan (with Medical Decision Making)     I have reviewed the nursing notes.    I have reviewed the findings, diagnosis, plan and need for follow up with the patient.  Latrell Al is a 55 year old male who presents today with infected cyst. Patient states about 3 years ago he noticed a small lump to the left cheek about a month after having a colonoscopy done.  About 2 inches away from the rectum on the buttocks.  Patient states about a month ago he noticed that the lump has increased in size and last week it started to become painful and draining on its own.  He states that there was pus and bloody malodorous drainage that has since continued.  Patient states now that there is some surrounding redness.  He rates the pain a 5 out of 10 on pain scale.  Patient denies any fevers, chills, rectal pain, abdominal pain, nausea or vomiting, urinary symptoms.  He states that he has had a slight decrease in appetite yesterday.  Patient has not been doing anything for the area and keeping it clean and bandaged.  Patient denies history of MRSA.    See exam findings above.  Patient's exam findings consistent with epidermoid cyst that is draining on its own some with surrounding cellulitis on the left buttocks.  This is not perirectal in origin but is in the gluteal fold on the left butt cheek.  See incision and drainage procedure above.  Wound culture was not sent since there was no purulent drainage.  Patient sent home with prescription Augmentin twice daily for 10 days and given general surgery referral for further evaluation management.  Patient to keep area clean, Tylenol and ibuprofen over-the-counter as needed for pain and sits baths recommended.  This was discussed with patient and given a discharge paperwork.  Patient discharged in stable condition.    New Prescriptions    AMOXICILLIN-CLAVULANATE (AUGMENTIN) 875-125 MG TABLET    Take 1 tablet by  mouth 2 times daily for 10 days       Final diagnoses:   Infected epidermoid cyst - of buttock       6/5/2021   [facility] EMERGENCY DEPT     Sarah Zayas PA-C  06/05/21 0019

## 2021-06-05 NOTE — ED TRIAGE NOTES
"Patient reports ~ 3 years ago following his colonoscopy (~ 1 month after) he noted a small \"lump\" about ~ 1.5 inches above rectum. Patient believed had been a lymphoma. ~ 1 month ago patient he noted the \"lump\" increasing in size. Last week, started to leak. Pus and bloody, malodorous drainage continues. Pain 5/10 while sitting.    "

## 2021-06-08 ENCOUNTER — OFFICE VISIT (OUTPATIENT)
Dept: FAMILY MEDICINE | Facility: CLINIC | Age: 56
End: 2021-06-08
Payer: COMMERCIAL

## 2021-06-08 VITALS
HEIGHT: 70 IN | RESPIRATION RATE: 16 BRPM | TEMPERATURE: 98.7 F | SYSTOLIC BLOOD PRESSURE: 120 MMHG | WEIGHT: 236 LBS | OXYGEN SATURATION: 98 % | DIASTOLIC BLOOD PRESSURE: 80 MMHG | BODY MASS INDEX: 33.79 KG/M2 | HEART RATE: 65 BPM

## 2021-06-08 DIAGNOSIS — L72.0 INFECTED EPIDERMOID CYST: Primary | ICD-10-CM

## 2021-06-08 DIAGNOSIS — L08.9 INFECTED EPIDERMOID CYST: Primary | ICD-10-CM

## 2021-06-08 PROCEDURE — 99213 OFFICE O/P EST LOW 20 MIN: CPT | Performed by: FAMILY MEDICINE

## 2021-06-08 ASSESSMENT — MIFFLIN-ST. JEOR: SCORE: 1901.74

## 2021-06-08 NOTE — PROGRESS NOTES
"    Assessment & Plan     Infected epidermoid cyst vs perirectal abscess  Patient reports improvement s/p I&D completed in ED on 6/5/2021. Continues to be on Augmentin for a 10 day total course. He was suppose to schedule with general surgery but has not done this. He is afebrile, VSS, and pain is greatly improved which is reassuring. I am concerned regarding the location and induration as this could represent a perirectal abscess and may need further intervention. Will refer to general surgery for further evaluation and cares. Appreciate Surgery seeing the patient.   - GENERAL SURG ADULT REFERRAL    Follow up with General Surgery.     Tommie Worrell DO  [facility]    Marnie Quiros is a 55 year old who presents for the following health issues     HPI     ED/UC Followup:    Facility:  [facility]  Date of visit: 6/5/2021  Reason for visit: infected epidermoid cyst  Current Status: doing better.       Patiently recently seen in the emergency department on 6/5/2021 due to an infected epidermoid cyst on his left buttock.  This was incised and drained.  He was started on Augmentin twice daily for 10 days.  He was supposed to follow-up with general surgery but has not yet scheduled this appointment.     Reports doing well since being discharged from the ED. Pain improved.  Has been taking his antibiotic as prescribed.  No fevers, chills, rigors.  Reports occasional slight drainage from where the  Cyst/abscesses is located.       Review of Systems   Constitutional, HEENT, cardiovascular, pulmonary, gi and gu systems are negative, except as otherwise noted.      Objective    /80 (BP Location: Right arm, Patient Position: Chair, Cuff Size: Adult Large)   Pulse 65   Temp 98.7  F (37.1  C) (Tympanic)   Resp 16   Ht 1.778 m (5' 10\")   Wt 107 kg (236 lb)   SpO2 98%   BMI 33.86 kg/m    Body mass index is 33.86 kg/m .  Physical Exam   General: alert, cooperative, no acute " distress  Skin: On left buttock roughly 1.5 inches from the rectum there is a prior stab incision which remains open. No drainage present. No surrounding erythema. Mildly tender to palpation. Able to appreciate induration under the skin near stab incision site.

## 2021-06-17 ENCOUNTER — OFFICE VISIT (OUTPATIENT)
Dept: SURGERY | Facility: CLINIC | Age: 56
End: 2021-06-17
Payer: COMMERCIAL

## 2021-06-17 VITALS
RESPIRATION RATE: 16 BRPM | TEMPERATURE: 97.7 F | HEART RATE: 59 BPM | SYSTOLIC BLOOD PRESSURE: 116 MMHG | DIASTOLIC BLOOD PRESSURE: 80 MMHG

## 2021-06-17 DIAGNOSIS — L72.0 EPIDERMAL INCLUSION CYST: Primary | ICD-10-CM

## 2021-06-17 PROCEDURE — 99213 OFFICE O/P EST LOW 20 MIN: CPT | Performed by: SURGERY

## 2021-06-17 NOTE — NURSING NOTE
"Initial /80 (BP Location: Right arm, Patient Position: Chair, Cuff Size: Adult Regular)   Pulse 59   Temp 97.7  F (36.5  C) (Oral)   Resp 16  Estimated body mass index is 33.86 kg/m  as calculated from the following:    Height as of 6/8/21: 1.778 m (5' 10\").    Weight as of 6/8/21: 107 kg (236 lb). .    Patient is here for a cyst.  stacy ding LPN    "

## 2021-06-17 NOTE — LETTER
"    6/17/2021         RE: Latrell Al  [address] [place] [facility] [address]        Dear Colleague,    Thank you for referring your patient, Latrell Al, to the [facility]. Please see a copy of my visit note below.    Surgical Consultation/History and Physical  [facility]    Latrell is seen in consultation for gluteal cyst, at the request of Gerald Roche MD.    Chief Complaint:  Gluteal cyst    History of Present Illness: Latrell Al is a 55 year old male presents with gluteal cyst.  Patient was seen in ED, had incision and drainage.  He has completed course of antibiotics and feels back to normal other than the \"pea sized\" residual mass.  No change in stool habits. No pain at site.  He feels this may relate to the weather at the time and his protective equipment he wears at work.    Patient Active Problem List   Diagnosis     Cardiovascular disease     Essential hypertension     Hyperlipidemia LDL goal <70     GERD (gastroesophageal reflux disease)     ED (erectile dysfunction)     Right foot infection     Cellulitis of foot, right     Tobacco abuse     Obesity (BMI 35.0-39.9) with comorbidity (H)     Lipoma of skin and subcutaneous tissue     Pain in both wrists       Past Medical History:   Diagnosis Date     Esophageal reflux      TOBACCO ABUSE-CONTINUOUS        Past Surgical History:   Procedure Laterality Date     COLONOSCOPY       COLONOSCOPY N/A 7/13/2015    Procedure: COLONOSCOPY;  Surgeon: Maycol Singer MD;  Location: [facility]     COLONOSCOPY N/A 7/30/2018    Procedure: COLONOSCOPY;  colonoscopy;  Surgeon: Latrell Proctor MD;  Location: [facility]     SURGICAL HISTORY OF -   12/10/2004    Right Knee Medial Meniscectomy     SURGICAL HISTORY OF -   9/4/2001    EGD     SURGICAL HISTORY OF -   2/7/1995    Partial medial meniscectomy posteromedial horn meniscus, right knee       Family History   Problem Relation Age of Onset     C.A.D. Father         " MI 42     Cancer Father         kidney     Hypertension Mother      Diabetes Mother      Heart Disease Mother         CABG     Cancer Mother         lung     Breast Cancer Sister      Cancer Maternal Grandmother         skin     Cancer - colorectal No family hx of      Prostate Cancer No family hx of        Social History     Tobacco Use     Smoking status: Current Some Day Smoker     Years: 36.00     Types: Cigarettes, Cigars     Last attempt to quit: 2008     Years since quittin.4     Smokeless tobacco: Never Used     Tobacco comment: 1-2 ciggs a day   Substance Use Topics     Alcohol use: Yes     Comment: 3-6 cans of beer per month or less        History   Drug Use No       Current Outpatient Medications   Medication Sig Dispense Refill     ASPIRIN 81 MG OR TABS ONE DAILY       atenolol (TENORMIN) 50 MG tablet Take 1 tablet (50 mg) by mouth daily 90 tablet 3     lisinopril (ZESTRIL) 10 MG tablet Take 1 tablet (10 mg) by mouth daily 90 tablet 3     nitroGLYcerin (NITROSTAT) 0.4 MG sublingual tablet Place 1 tablet (0.4 mg) under the tongue every 5 minutes as needed for chest pain up to 3 tablets per episode. 25 tablet 3     order for DME Equipment being ordered: left and right wrist braces 1 Device 0     sildenafil (VIAGRA) 100 MG tablet Take 1 tablet (100 mg) by mouth daily as needed (ED) 30 min to 4 hrs before sex.  Hold on file until needed 6 tablet 11     simvastatin (ZOCOR) 40 MG tablet Take 1 tablet (40 mg) by mouth At Bedtime at bedtime. 90 tablet 3       No Known Allergies    Review of Systems:   10 point ROS otherwise negative    Physical Exam:  /80 (BP Location: Right arm, Patient Position: Chair, Cuff Size: Adult Regular)   Pulse 59   Temp 97.7  F (36.5  C) (Oral)   Resp 16     Constitutional- No acute distress, well nourished, non-toxic  Eyes: Anicteric, no injection.  PERRL  ENT:  Normocephalic, atraumatic, Nose midline, moist mucus membranes  Neck - supple, no LAD  Respiratory- Clear  to auscultation bilaterally, good inspiratory effort  Cardiovascular - Heart RRR, no lift's, thrills, murmurs, rubs, or gallop.  No peripheral edema.  No clubbing.  Abdomen - Soft, non-tender, +BS, no hepatosplenomegaly, no palpable masses  Rectal - good tone, no masses, guaiac negative; left gluteal region: 5mm mass with scarring toward anus from previous incision and drainage; 4.0 cm from anal verge at 5 O'clock.  No tenderness in this region on rectal  Neuro - No focal neuro deficits, Alert and oriented x 3  Psych: Appropriate mood and affect  Musculoskeletal: Normal gait, symmetric strength.  FROM upper and lower extremities.  Skin: Warm, Dry    Assessment:  1. Epidermal inclusion cyst       Plan:   Mr. Al presents for follow-up of epidermal inclusion cyst.  He has no signs of infection and no signs of perianal involvement or rectal involvement.  Discussed options of office based excision versus watchful waiting, patient wishes to continue with watchful waiting.  Advised if this increases in size, begins to bother him would advise office based excision.  Patient comfortable with plan.      Tal Gates DO on 6/17/2021 at 2:02 PM        Again, thank you for allowing me to participate in the care of your patient.        Sincerely,        Tal Gates DO

## 2021-09-12 ENCOUNTER — HEALTH MAINTENANCE LETTER (OUTPATIENT)
Age: 56
End: 2021-09-12

## 2022-03-25 ENCOUNTER — OFFICE VISIT (OUTPATIENT)
Dept: FAMILY MEDICINE | Facility: CLINIC | Age: 57
End: 2022-03-25
Payer: COMMERCIAL

## 2022-03-25 VITALS
OXYGEN SATURATION: 96 % | HEART RATE: 58 BPM | HEIGHT: 69 IN | WEIGHT: 237 LBS | SYSTOLIC BLOOD PRESSURE: 132 MMHG | TEMPERATURE: 98.9 F | BODY MASS INDEX: 35.1 KG/M2 | RESPIRATION RATE: 18 BRPM | DIASTOLIC BLOOD PRESSURE: 78 MMHG

## 2022-03-25 DIAGNOSIS — N52.9 ERECTILE DYSFUNCTION, UNSPECIFIED ERECTILE DYSFUNCTION TYPE: ICD-10-CM

## 2022-03-25 DIAGNOSIS — I25.10 CARDIOVASCULAR DISEASE: ICD-10-CM

## 2022-03-25 DIAGNOSIS — Z00.00 ROUTINE GENERAL MEDICAL EXAMINATION AT A HEALTH CARE FACILITY: Primary | ICD-10-CM

## 2022-03-25 DIAGNOSIS — E78.5 HYPERLIPIDEMIA LDL GOAL <70: ICD-10-CM

## 2022-03-25 DIAGNOSIS — Z12.5 SCREENING FOR PROSTATE CANCER: ICD-10-CM

## 2022-03-25 DIAGNOSIS — I10 ESSENTIAL HYPERTENSION: ICD-10-CM

## 2022-03-25 LAB
ANION GAP SERPL CALCULATED.3IONS-SCNC: 1 MMOL/L (ref 3–14)
BUN SERPL-MCNC: 14 MG/DL (ref 7–30)
CALCIUM SERPL-MCNC: 8.8 MG/DL (ref 8.5–10.1)
CHLORIDE BLD-SCNC: 104 MMOL/L (ref 94–109)
CHOLEST SERPL-MCNC: 138 MG/DL
CO2 SERPL-SCNC: 33 MMOL/L (ref 20–32)
CREAT SERPL-MCNC: 0.97 MG/DL (ref 0.66–1.25)
FASTING STATUS PATIENT QL REPORTED: YES
GFR SERPL CREATININE-BSD FRML MDRD: 90 ML/MIN/1.73M2
GLUCOSE BLD-MCNC: 96 MG/DL (ref 70–99)
HDLC SERPL-MCNC: 43 MG/DL
LDLC SERPL CALC-MCNC: 71 MG/DL
NONHDLC SERPL-MCNC: 95 MG/DL
POTASSIUM BLD-SCNC: 4.3 MMOL/L (ref 3.4–5.3)
PSA SERPL-MCNC: 0.62 UG/L (ref 0–4)
SODIUM SERPL-SCNC: 138 MMOL/L (ref 133–144)
TRIGL SERPL-MCNC: 120 MG/DL

## 2022-03-25 PROCEDURE — 36415 COLL VENOUS BLD VENIPUNCTURE: CPT | Performed by: FAMILY MEDICINE

## 2022-03-25 PROCEDURE — 99214 OFFICE O/P EST MOD 30 MIN: CPT | Mod: 25 | Performed by: FAMILY MEDICINE

## 2022-03-25 PROCEDURE — 99396 PREV VISIT EST AGE 40-64: CPT | Performed by: FAMILY MEDICINE

## 2022-03-25 PROCEDURE — 80048 BASIC METABOLIC PNL TOTAL CA: CPT | Performed by: FAMILY MEDICINE

## 2022-03-25 PROCEDURE — G0103 PSA SCREENING: HCPCS | Performed by: FAMILY MEDICINE

## 2022-03-25 PROCEDURE — 80061 LIPID PANEL: CPT | Performed by: FAMILY MEDICINE

## 2022-03-25 RX ORDER — ATENOLOL 50 MG/1
50 TABLET ORAL DAILY
Qty: 90 TABLET | Refills: 3 | Status: SHIPPED | OUTPATIENT
Start: 2022-03-25 | End: 2023-03-31

## 2022-03-25 RX ORDER — NITROGLYCERIN 0.4 MG/1
0.4 TABLET SUBLINGUAL EVERY 5 MIN PRN
Qty: 25 TABLET | Refills: 3 | Status: SHIPPED | OUTPATIENT
Start: 2022-03-25 | End: 2023-03-31

## 2022-03-25 RX ORDER — SIMVASTATIN 40 MG
40 TABLET ORAL AT BEDTIME
Qty: 90 TABLET | Refills: 3 | Status: SHIPPED | OUTPATIENT
Start: 2022-03-25 | End: 2023-03-31

## 2022-03-25 RX ORDER — SILDENAFIL 100 MG/1
100 TABLET, FILM COATED ORAL DAILY PRN
Qty: 18 TABLET | Refills: 3 | Status: SHIPPED | OUTPATIENT
Start: 2022-03-25 | End: 2023-03-31

## 2022-03-25 RX ORDER — LISINOPRIL 10 MG/1
10 TABLET ORAL DAILY
Qty: 90 TABLET | Refills: 3 | Status: SHIPPED | OUTPATIENT
Start: 2022-03-25 | End: 2023-03-31

## 2022-03-25 ASSESSMENT — ENCOUNTER SYMPTOMS
CHILLS: 0
FEVER: 0
SHORTNESS OF BREATH: 0
COUGH: 0
HEADACHES: 0
ABDOMINAL PAIN: 0
DIARRHEA: 0
HEMATOCHEZIA: 0
MYALGIAS: 0
FREQUENCY: 0
ARTHRALGIAS: 0
SORE THROAT: 0
PARESTHESIAS: 0
DYSURIA: 0
JOINT SWELLING: 0
HEMATURIA: 0
PALPITATIONS: 0
CONSTIPATION: 0
NERVOUS/ANXIOUS: 0
HEARTBURN: 0
EYE PAIN: 0
WEAKNESS: 0
NAUSEA: 0
DIZZINESS: 0

## 2022-03-25 ASSESSMENT — PAIN SCALES - GENERAL: PAINLEVEL: NO PAIN (0)

## 2022-03-25 NOTE — PROGRESS NOTES
SUBJECTIVE:   CC: Latrell Al is an 56 year old male who presents for preventative health visit.       Patient has been advised of split billing requirements and indicates understanding: Yes  Healthy Habits:     Getting at least 3 servings of Calcium per day:  Yes    Bi-annual eye exam:  Yes    Dental care twice a year:  Yes    Sleep apnea or symptoms of sleep apnea:  None    Diet:  Regular (no restrictions)    Frequency of exercise:  None    Taking medications regularly:  Yes    Medication side effects:  None    PHQ-2 Total Score: 0    Additional concerns today:  No              Today's PHQ-2 Score:   PHQ-2 (  [employer]) 3/25/2022   Q1: Little interest or pleasure in doing things 0   Q2: Feeling down, depressed or hopeless 0   PHQ-2 Score 0   PHQ-2 Total Score (12-17 Years)- Positive if 3 or more points; Administer PHQ-A if positive -   Q1: Little interest or pleasure in doing things Not at all   Q2: Feeling down, depressed or hopeless Not at all   PHQ-2 Score 0       Abuse: Current or Past(Physical, Sexual or Emotional)- No  Do you feel safe in your environment? Yes        Social History     Tobacco Use     Smoking status: Current Some Day Smoker     Years: 36.00     Types: Cigarettes, Cigars     Last attempt to quit: 2008     Years since quittin.2     Smokeless tobacco: Never Used     Tobacco comment: 1-2 ciggs a day   Substance Use Topics     Alcohol use: Yes     Comment: 3-6 cans of beer per month or less     If you drink alcohol do you typically have >3 drinks per day or >7 drinks per week? No    Alcohol Use 3/25/2022   Prescreen: >3 drinks/day or >7 drinks/week? No   Prescreen: >3 drinks/day or >7 drinks/week? -       Last PSA:   PSA   Date Value Ref Range Status   03/10/2021 0.56 0 - 4 ug/L Final     Comment:     Assay Method:  Chemiluminescence using Siemens Vista analyzer       Reviewed orders with patient. Reviewed health maintenance and updated orders accordingly - Yes      Reviewed and  "updated as needed this visit by clinical staff   Tobacco  Allergies  Meds   Med Hx  Surg Hx  Fam Hx  Soc Hx        Reviewed and updated as needed this visit by Provider                     Review of Systems   Constitutional: Negative for chills and fever.   HENT: Positive for hearing loss. Negative for congestion, ear pain and sore throat.    Eyes: Negative for pain and visual disturbance.   Respiratory: Negative for cough and shortness of breath.    Cardiovascular: Negative for chest pain, palpitations and peripheral edema.   Gastrointestinal: Negative for abdominal pain, constipation, diarrhea, heartburn, hematochezia and nausea.   Genitourinary: Negative for dysuria, frequency, genital sores, hematuria, impotence, penile discharge and urgency.   Musculoskeletal: Negative for arthralgias, joint swelling and myalgias.   Skin: Negative for rash.   Neurological: Negative for dizziness, weakness, headaches and paresthesias.   Psychiatric/Behavioral: Negative for mood changes. The patient is not nervous/anxious.          OBJECTIVE:   /78 (BP Location: Right arm, Patient Position: Sitting, Cuff Size: Adult Large)   Pulse 58   Temp 98.9  F (37.2  C) (Tympanic)   Resp 18   Ht 1.74 m (5' 8.5\")   Wt 107.5 kg (237 lb)   SpO2 96%   BMI 35.51 kg/m      Physical Exam  GENERAL: healthy, alert and no distress  EYES: Eyes grossly normal to inspection, PERRL and conjunctivae and sclerae normal  HENT: ear canals and TM's normal, nose and mouth without ulcers or lesions  NECK: no adenopathy, no asymmetry, masses, or scars and thyroid normal to palpation  RESP: lungs clear to auscultation - no rales, rhonchi or wheezes  CV: regular rate and rhythm, normal S1 S2, no S3 or S4, no murmur, click or rub, no peripheral edema and peripheral pulses strong  ABDOMEN: soft, nontender, no hepatosplenomegaly, no masses and bowel sounds normal   (male): normal male genitalia without lesions or urethral discharge, no hernia  MS: " "no gross musculoskeletal defects noted, no edema  SKIN: no suspicious lesions or rashes  NEURO: Normal strength and tone, mentation intact and speech normal  PSYCH: mentation appears normal, affect normal/bright        ASSESSMENT/PLAN:   (Z00.00) Routine general medical examination at a health care facility  (primary encounter diagnosis)  Comment: Discussed healthy lifestyle and preventative cares.    Plan:     (I25.10) Cardiovascular disease  Comment: stable and refilled med and check lab  Plan: Lipid panel reflex to direct LDL Fasting,         atenolol (TENORMIN) 50 MG tablet, lisinopril         (ZESTRIL) 10 MG tablet, nitroGLYcerin         (NITROSTAT) 0.4 MG sublingual tablet            (N52.9) Erectile dysfunction, unspecified erectile dysfunction type  Comment: refilled med  Plan: Basic metabolic panel, sildenafil (VIAGRA) 100         MG tablet            (E78.5) Hyperlipidemia LDL goal <70  Comment: check lab  Plan: Lipid panel reflex to direct LDL Fasting,         simvastatin (ZOCOR) 40 MG tablet        controlled    (I10) Essential hypertension  Comment: controlled refilled, check lab  Plan: Basic metabolic panel            (Z12.5) Screening for prostate cancer  Comment:   Plan: Prostate Specific Antigen Screen              Patient has been advised of split billing requirements and indicates understanding: Yes    COUNSELING:   Reviewed preventive health counseling, as reflected in patient instructions       Regular exercise       Healthy diet/nutrition       Colorectal cancer screening       Prostate cancer screening    Estimated body mass index is 35.51 kg/m  as calculated from the following:    Height as of this encounter: 1.74 m (5' 8.5\").    Weight as of this encounter: 107.5 kg (237 lb).     Weight management plan: diet    He reports that he has been smoking cigarettes and cigars. He has smoked for the past 36.00 years. He has never used smokeless tobacco.  Tobacco Cessation Action Plan:   Information " offered: Patient not interested at this time      Counseling Resources:  ATP IV Guidelines  Pooled Cohorts Equation Calculator  FRAX Risk Assessment  ICSI Preventive Guidelines  Dietary Guidelines for Americans, 2010  USDA's MyPlate  ASA Prophylaxis  Lung CA Screening    Gerald Roche MD  [facility]

## 2022-03-25 NOTE — LETTER
March 28, 2022      Ishaan Al  [address] [address] [facility] [id number]        Dear ,    We are writing to inform you of your test results.    You have normal cholesterol, well controlled!  You have normal kidney function.    Resulted Orders   Basic metabolic panel   Result Value Ref Range    Sodium 138 133 - 144 mmol/L    Potassium 4.3 3.4 - 5.3 mmol/L    Chloride 104 94 - 109 mmol/L    Carbon Dioxide (CO2) 33 (H) 20 - 32 mmol/L    Anion Gap 1 (L) 3 - 14 mmol/L    Urea Nitrogen 14 7 - 30 mg/dL    Creatinine 0.97 0.66 - 1.25 mg/dL    Calcium 8.8 8.5 - 10.1 mg/dL    Glucose 96 70 - 99 mg/dL    GFR Estimate 90 >60 mL/min/1.73m2      Comment:      Effective December 21, 2021 eGFRcr in adults is calculated using the 2021 CKD-EPI creatinine equation which includes age and gender (Pillo palacio al., NE, DOI: 10.1056/LNSIzt0518425)   Lipid panel reflex to direct LDL Fasting   Result Value Ref Range    Cholesterol 138 <200 mg/dL    Triglycerides 120 <150 mg/dL    Direct Measure HDL 43 >=40 mg/dL    LDL Cholesterol Calculated 71 <=100 mg/dL    Non HDL Cholesterol 95 <130 mg/dL    Patient Fasting > 8hrs? Yes     Narrative    Cholesterol  Desirable:  <200 mg/dL    Triglycerides  Normal:  Less than 150 mg/dL  Borderline High:  150-199 mg/dL  High:  200-499 mg/dL  Very High:  Greater than or equal to 500 mg/dL    Direct Measure HDL  Female:  Greater than or equal to 50 mg/dL   Male:  Greater than or equal to 40 mg/dL    LDL Cholesterol  Desirable:  <100mg/dL  Above Desirable:  100-129 mg/dL   Borderline High:  130-159 mg/dL   High:  160-189 mg/dL   Very High:  >= 190 mg/dL    Non HDL Cholesterol  Desirable:  130 mg/dL  Above Desirable:  130-159 mg/dL  Borderline High:  160-189 mg/dL  High:  190-219 mg/dL  Very High:  Greater than or equal to 220 mg/dL   Prostate Specific Antigen Screen   Result Value Ref Range    Prostate Specific Antigen Screen 0.62 0.00 - 4.00 ug/L    Narrative    Assay Method:  Chemiluminescence  using Siemens   Vista analyzer.       If you have any questions or concerns, please call the clinic at the number listed above.       Sincerely,      Gerald Roche MD

## 2022-03-25 NOTE — PATIENT INSTRUCTIONS
Please go to lab.    I have refilled your medications.    Please be aware that there will be an additional charge during your preventative visit due to either a new diagnosis and/or chronic disease management.    Preventative visits screen for diseases prior to they occur.  They do not cover for any new diagnosis or chronic disease management which would include medication refills, labs etc.    If you have questions regarding your coverage please check with your insurance provider.  At [facility] we need to code correctly to be in compliance with all insurance companies.          Thank you for choosing [facility].  You may be receiving an email and/or telephone survey request from [facility] Customer Experience regarding your visit today.  Please take a few minutes to respond to the survey to let us know how we are doing.      If you have questions or concerns, please contact us via [employer] or you can contact your care team at [phone] option [phone].    Our Clinic hours are:  Monday - Thursday 7am-6pm  Friday 7am-5pm    The [place] outpatient lab hours are:  Monday - Friday 7am-4:30pm    Appointments are required, call [phone]    If you have clinical questions after hours or would like to schedule an appointment,  call the clinic at [phone].    Preventive Health Recommendations  Male Ages 50 - 64    Yearly exam:             See your health care provider every year in order to  o   Review health changes.   o   Discuss preventive care.    o   Review your medicines if your doctor has prescribed any.     Have a cholesterol test every 5 years, or more frequently if you are at risk for high cholesterol/heart disease.     Have a diabetes test (fasting glucose) every three years. If you are at risk for diabetes, you should have this test more often.     Have a colonoscopy at age 50, or have a yearly FIT test (stool test). These exams will check for colon cancer.      Talk with your health care provider about  whether or not a prostate cancer screening test (PSA) is right for you.    You should be tested each year for STDs (sexually transmitted diseases), if you re at risk.     Shots: Get a flu shot each year. Get a tetanus shot every 10 years.     Nutrition:    Eat at least 5 servings of fruits and vegetables daily.     Eat whole-grain bread, whole-wheat pasta and brown rice instead of white grains and rice.     Get adequate Calcium and Vitamin D.     Lifestyle    Exercise for at least 150 minutes a week (30 minutes a day, 5 days a week). This will help you control your weight and prevent disease.     Limit alcohol to one drink per day.     No smoking.     Wear sunscreen to prevent skin cancer.     See your dentist every six months for an exam and cleaning.     See your eye doctor every 1 to 2 years.

## 2022-11-19 ENCOUNTER — HEALTH MAINTENANCE LETTER (OUTPATIENT)
Age: 57
End: 2022-11-19

## 2023-03-30 ASSESSMENT — ENCOUNTER SYMPTOMS
ARTHRALGIAS: 0
DYSURIA: 0
PARESTHESIAS: 0
NAUSEA: 0
HEMATOCHEZIA: 0
WEAKNESS: 0
DIARRHEA: 0
CHILLS: 0
MYALGIAS: 0
HEADACHES: 0
FEVER: 0
NERVOUS/ANXIOUS: 0
ABDOMINAL PAIN: 0
CONSTIPATION: 0
EYE PAIN: 0
PALPITATIONS: 0
DIZZINESS: 0
FREQUENCY: 0
SHORTNESS OF BREATH: 0
HEMATURIA: 0
HEARTBURN: 0
JOINT SWELLING: 0
SORE THROAT: 0
COUGH: 0

## 2023-03-31 ENCOUNTER — OFFICE VISIT (OUTPATIENT)
Dept: FAMILY MEDICINE | Facility: CLINIC | Age: 58
End: 2023-03-31
Payer: COMMERCIAL

## 2023-03-31 VITALS
OXYGEN SATURATION: 97 % | WEIGHT: 239.8 LBS | HEART RATE: 60 BPM | BODY MASS INDEX: 34.33 KG/M2 | TEMPERATURE: 97.6 F | RESPIRATION RATE: 20 BRPM | HEIGHT: 70 IN | DIASTOLIC BLOOD PRESSURE: 88 MMHG | SYSTOLIC BLOOD PRESSURE: 135 MMHG

## 2023-03-31 DIAGNOSIS — I25.10 CARDIOVASCULAR DISEASE: ICD-10-CM

## 2023-03-31 DIAGNOSIS — E78.5 HYPERLIPIDEMIA LDL GOAL <70: ICD-10-CM

## 2023-03-31 DIAGNOSIS — N52.9 ERECTILE DYSFUNCTION, UNSPECIFIED ERECTILE DYSFUNCTION TYPE: ICD-10-CM

## 2023-03-31 DIAGNOSIS — Z00.00 ROUTINE GENERAL MEDICAL EXAMINATION AT A HEALTH CARE FACILITY: Primary | ICD-10-CM

## 2023-03-31 DIAGNOSIS — R14.0 BLOATING: ICD-10-CM

## 2023-03-31 DIAGNOSIS — Z12.5 SCREENING FOR PROSTATE CANCER: ICD-10-CM

## 2023-03-31 DIAGNOSIS — K59.00 CONSTIPATION, UNSPECIFIED CONSTIPATION TYPE: ICD-10-CM

## 2023-03-31 DIAGNOSIS — Z11.4 SCREENING FOR HIV (HUMAN IMMUNODEFICIENCY VIRUS): ICD-10-CM

## 2023-03-31 DIAGNOSIS — Z11.59 NEED FOR HEPATITIS C SCREENING TEST: ICD-10-CM

## 2023-03-31 DIAGNOSIS — I10 ESSENTIAL HYPERTENSION: ICD-10-CM

## 2023-03-31 PROBLEM — E66.01 MORBID OBESITY (H): Status: RESOLVED | Noted: 2019-02-15 | Resolved: 2023-03-31

## 2023-03-31 LAB
ALBUMIN SERPL BCG-MCNC: 4.4 G/DL (ref 3.5–5.2)
ALP SERPL-CCNC: 31 U/L (ref 40–129)
ALT SERPL W P-5'-P-CCNC: 15 U/L (ref 10–50)
ANION GAP SERPL CALCULATED.3IONS-SCNC: 11 MMOL/L (ref 7–15)
AST SERPL W P-5'-P-CCNC: 23 U/L (ref 10–50)
BASOPHILS # BLD AUTO: 0.1 10E3/UL (ref 0–0.2)
BASOPHILS NFR BLD AUTO: 1 %
BILIRUB DIRECT SERPL-MCNC: <0.2 MG/DL (ref 0–0.3)
BILIRUB SERPL-MCNC: 0.8 MG/DL
BUN SERPL-MCNC: 13.7 MG/DL (ref 8–23)
CALCIUM SERPL-MCNC: 9.6 MG/DL (ref 8.6–10)
CHLORIDE SERPL-SCNC: 100 MMOL/L (ref 98–107)
CHOLEST SERPL-MCNC: 128 MG/DL
CREAT SERPL-MCNC: 0.99 MG/DL (ref 0.67–1.17)
DEPRECATED HCO3 PLAS-SCNC: 32 MMOL/L (ref 22–29)
EOSINOPHIL # BLD AUTO: 0.2 10E3/UL (ref 0–0.7)
EOSINOPHIL NFR BLD AUTO: 2 %
ERYTHROCYTE [DISTWIDTH] IN BLOOD BY AUTOMATED COUNT: 13.6 % (ref 10–15)
GFR SERPL CREATININE-BSD FRML MDRD: 88 ML/MIN/1.73M2
GLUCOSE SERPL-MCNC: 95 MG/DL (ref 70–99)
HCT VFR BLD AUTO: 49.3 % (ref 40–53)
HCV AB SERPL QL IA: NONREACTIVE
HDLC SERPL-MCNC: 40 MG/DL
HGB BLD-MCNC: 16 G/DL (ref 13.3–17.7)
HIV 1+2 AB+HIV1 P24 AG SERPL QL IA: NONREACTIVE
LDLC SERPL CALC-MCNC: 68 MG/DL
LIPASE SERPL-CCNC: 9 U/L (ref 13–60)
LYMPHOCYTES # BLD AUTO: 3 10E3/UL (ref 0.8–5.3)
LYMPHOCYTES NFR BLD AUTO: 32 %
MCH RBC QN AUTO: 29.9 PG (ref 26.5–33)
MCHC RBC AUTO-ENTMCNC: 32.5 G/DL (ref 31.5–36.5)
MCV RBC AUTO: 92 FL (ref 78–100)
MONOCYTES # BLD AUTO: 0.6 10E3/UL (ref 0–1.3)
MONOCYTES NFR BLD AUTO: 7 %
NEUTROPHILS # BLD AUTO: 5.4 10E3/UL (ref 1.6–8.3)
NEUTROPHILS NFR BLD AUTO: 58 %
NONHDLC SERPL-MCNC: 88 MG/DL
PLATELET # BLD AUTO: 207 10E3/UL (ref 150–450)
POTASSIUM SERPL-SCNC: 4.1 MMOL/L (ref 3.4–5.3)
PROT SERPL-MCNC: 7.6 G/DL (ref 6.4–8.3)
PSA SERPL DL<=0.01 NG/ML-MCNC: 0.45 NG/ML (ref 0–3.5)
RBC # BLD AUTO: 5.35 10E6/UL (ref 4.4–5.9)
SODIUM SERPL-SCNC: 143 MMOL/L (ref 136–145)
TRIGL SERPL-MCNC: 98 MG/DL
WBC # BLD AUTO: 9.3 10E3/UL (ref 4–11)

## 2023-03-31 PROCEDURE — G0103 PSA SCREENING: HCPCS | Performed by: FAMILY MEDICINE

## 2023-03-31 PROCEDURE — 36415 COLL VENOUS BLD VENIPUNCTURE: CPT | Performed by: FAMILY MEDICINE

## 2023-03-31 PROCEDURE — 99396 PREV VISIT EST AGE 40-64: CPT | Performed by: FAMILY MEDICINE

## 2023-03-31 PROCEDURE — 82248 BILIRUBIN DIRECT: CPT | Performed by: FAMILY MEDICINE

## 2023-03-31 PROCEDURE — 87389 HIV-1 AG W/HIV-1&-2 AB AG IA: CPT | Performed by: FAMILY MEDICINE

## 2023-03-31 PROCEDURE — 80053 COMPREHEN METABOLIC PANEL: CPT | Performed by: FAMILY MEDICINE

## 2023-03-31 PROCEDURE — 86803 HEPATITIS C AB TEST: CPT | Performed by: FAMILY MEDICINE

## 2023-03-31 PROCEDURE — 80061 LIPID PANEL: CPT | Performed by: FAMILY MEDICINE

## 2023-03-31 PROCEDURE — 99214 OFFICE O/P EST MOD 30 MIN: CPT | Mod: 25 | Performed by: FAMILY MEDICINE

## 2023-03-31 PROCEDURE — 85025 COMPLETE CBC W/AUTO DIFF WBC: CPT | Performed by: FAMILY MEDICINE

## 2023-03-31 PROCEDURE — 83690 ASSAY OF LIPASE: CPT | Performed by: FAMILY MEDICINE

## 2023-03-31 RX ORDER — LISINOPRIL 10 MG/1
10 TABLET ORAL DAILY
Qty: 90 TABLET | Refills: 3 | Status: SHIPPED | OUTPATIENT
Start: 2023-03-31 | End: 2023-04-19

## 2023-03-31 RX ORDER — NITROGLYCERIN 0.4 MG/1
0.4 TABLET SUBLINGUAL EVERY 5 MIN PRN
Qty: 25 TABLET | Refills: 3 | Status: SHIPPED | OUTPATIENT
Start: 2023-03-31 | End: 2024-05-03

## 2023-03-31 RX ORDER — SIMVASTATIN 40 MG
40 TABLET ORAL AT BEDTIME
Qty: 90 TABLET | Refills: 3 | Status: SHIPPED | OUTPATIENT
Start: 2023-03-31 | End: 2023-04-19

## 2023-03-31 RX ORDER — ATENOLOL 50 MG/1
50 TABLET ORAL DAILY
Qty: 90 TABLET | Refills: 3 | Status: SHIPPED | OUTPATIENT
Start: 2023-03-31 | End: 2023-04-19

## 2023-03-31 RX ORDER — SILDENAFIL 100 MG/1
100 TABLET, FILM COATED ORAL DAILY PRN
Qty: 18 TABLET | Refills: 3 | Status: SHIPPED | OUTPATIENT
Start: 2023-03-31 | End: 2024-05-03

## 2023-03-31 ASSESSMENT — ENCOUNTER SYMPTOMS
MYALGIAS: 0
HEADACHES: 0
JOINT SWELLING: 0
FEVER: 0
FREQUENCY: 0
ARTHRALGIAS: 0
CONSTIPATION: 0
PARESTHESIAS: 0
HEMATURIA: 0
DIZZINESS: 0
DIARRHEA: 0
SORE THROAT: 0
ABDOMINAL PAIN: 0
EYE PAIN: 0
NAUSEA: 0
COUGH: 0
SHORTNESS OF BREATH: 0
CHILLS: 0
NERVOUS/ANXIOUS: 0
HEMATOCHEZIA: 0
PALPITATIONS: 0
WEAKNESS: 0
HEARTBURN: 0
DYSURIA: 0

## 2023-03-31 ASSESSMENT — PAIN SCALES - GENERAL: PAINLEVEL: NO PAIN (0)

## 2023-03-31 NOTE — NURSING NOTE
"Chief Complaint   Patient presents with     Physical     Bloated       Initial There were no vitals taken for this visit. Estimated body mass index is 35.51 kg/m  as calculated from the following:    Height as of 3/25/22: 1.74 m (5' 8.5\").    Weight as of 3/25/22: 107.5 kg (237 lb).    Patient presents to the clinic using No DME    Is there anyone who you would like to be able to receive your results? No  If yes have patient fill out [facility]SCAR    "

## 2023-03-31 NOTE — PROGRESS NOTES
SUBJECTIVE:   CC: Ishaan is an 57 year old who presents for preventative health visit.   Additional Questions 3/31/2023   Roomed by anuradha wayne cma   Patient has been advised of split billing requirements and indicates understanding: Yes  Healthy Habits:     Getting at least 3 servings of Calcium per day:  NO    Bi-annual eye exam:  Yes    Dental care twice a year:  NO    Sleep apnea or symptoms of sleep apnea:  None    Diet:  Regular (no restrictions)    Frequency of exercise:  None    Taking medications regularly:  Yes    Medication side effects:  None    PHQ-2 Total Score: 0    Additional concerns today:  No    Feeling bloated for the past 6 months.  Slight weight gain.  Slight constipation. No fever or chills.  No night sweats.              Today's PHQ-2 Score:   PHQ-2 ( 1999 [employer]) 3/30/2023   Q1: Little interest or pleasure in doing things 0   Q2: Feeling down, depressed or hopeless 0   PHQ-2 Score 0   PHQ-2 Total Score (12-17 Years)- Positive if 3 or more points; Administer PHQ-A if positive -   Q1: Little interest or pleasure in doing things Not at all   Q2: Feeling down, depressed or hopeless Not at all   PHQ-2 Score 0           Social History     Tobacco Use     Smoking status: Some Days     Years: 36.00     Types: Cigarettes, Cigars     Last attempt to quit: 1/1/2008     Years since quitting: 15.2     Smokeless tobacco: Never     Tobacco comments:     1-2 ciggs a day   Substance Use Topics     Alcohol use: Yes     Comment: 3-6 cans of beer per month or less         Alcohol Use 3/30/2023   Prescreen: >3 drinks/day or >7 drinks/week? No   No flowsheet data found.    Last PSA:   PSA   Date Value Ref Range Status   03/10/2021 0.56 0 - 4 ug/L Final     Comment:     Assay Method:  Chemiluminescence using Siemens Vista analyzer     Prostate Specific Antigen Screen   Date Value Ref Range Status   03/25/2022 0.62 0.00 - 4.00 ug/L Final       Reviewed orders with patient. Reviewed health maintenance and updated orders  "accordingly - Yes      Reviewed and updated as needed this visit by clinical staff   Tobacco  Allergies               Reviewed and updated as needed this visit by Provider                     Review of Systems   Constitutional: Negative for chills and fever.   HENT: Negative for congestion, ear pain, hearing loss and sore throat.    Eyes: Negative for pain and visual disturbance.   Respiratory: Negative for cough and shortness of breath.    Cardiovascular: Negative for chest pain, palpitations and peripheral edema.   Gastrointestinal: Negative for abdominal pain, constipation, diarrhea, heartburn, hematochezia and nausea.   Genitourinary: Positive for impotence. Negative for dysuria, frequency, genital sores, hematuria, penile discharge and urgency.   Musculoskeletal: Negative for arthralgias, joint swelling and myalgias.   Skin: Negative for rash.   Neurological: Negative for dizziness, weakness, headaches and paresthesias.   Psychiatric/Behavioral: Negative for mood changes. The patient is not nervous/anxious.      Prior cigarette use was 1 pack would last 2 weeks.  About 1-2 cigs per day.    OBJECTIVE:   /88 (BP Location: Left arm, Patient Position: Sitting, Cuff Size: Adult Large)   Pulse 60   Temp 97.6  F (36.4  C) (Tympanic)   Resp 20   Ht 1.778 m (5' 10\")   Wt 108.8 kg (239 lb 12.8 oz)   SpO2 97%   BMI 34.41 kg/m      Physical Exam  GENERAL: healthy, alert and no distress  EYES: Eyes grossly normal to inspection, PERRL and conjunctivae and sclerae normal  HENT: ear canals and TM's normal, nose and mouth without ulcers or lesions  NECK: no adenopathy, no asymmetry, masses, or scars and thyroid normal to palpation  RESP: lungs clear to auscultation - no rales, rhonchi or wheezes  CV: regular rate and rhythm, normal S1 S2, no S3 or S4, no murmur, click or rub, no peripheral edema and peripheral pulses strong  ABDOMEN: soft, nontender, no hepatosplenomegaly, no masses and bowel sounds normal   " (male): normal male genitalia without lesions or urethral discharge, no hernia  MS: no gross musculoskeletal defects noted, no edema  SKIN: no suspicious lesions or rashes  NEURO: Normal strength and tone, mentation intact and speech normal  PSYCH: mentation appears normal, affect normal/bright  LYMPH: no cervical adenopathy        ASSESSMENT/PLAN:   (Z00.00) Routine general medical examination at a health care facility  (primary encounter diagnosis)  Comment: Discussed healthy lifestyle and preventative cares.    Plan:     (I25.10) Cardiovascular disease  Comment: stable on medication  Plan: nitroGLYcerin (NITROSTAT) 0.4 MG sublingual         tablet, lisinopril (ZESTRIL) 10 MG tablet,         atenolol (TENORMIN) 50 MG tablet, Basic         metabolic panel            (N52.9) Erectile dysfunction, unspecified erectile dysfunction type  Comment: on med and no side effects  Plan: sildenafil (VIAGRA) 100 MG tablet            (E78.5) Hyperlipidemia LDL goal <70  Comment: check lab and refilled med  Plan: simvastatin (ZOCOR) 40 MG tablet, Lipid panel         reflex to direct LDL Fasting            (Z11.4) Screening for HIV (human immunodeficiency virus)  Comment:   Plan: HIV Antigen Antibody Combo            (Z11.59) Need for hepatitis C screening test  Comment:   Plan: Hepatitis C Screen Reflex to HCV RNA Quant and         Genotype            (I10) Essential hypertension  Comment: controlled  Plan: Basic metabolic panel        On medications.  stable    (Z12.5) Screening for prostate cancer  Comment:   Plan: Prostate Specific Antigen Screen, Lipid panel         reflex to direct LDL Fasting            (R14.0) Bloating  Comment: add fiber and supplement medications  Plan: CBC with Platelets & Differential, Hepatic         function panel, Lipase            (K59.00) Constipation, unspecified constipation type  Comment: information is given  Plan:     Patient has been advised of split billing requirements and indicates  understanding: Yes      COUNSELING:   Reviewed preventive health counseling, as reflected in patient instructions       Regular exercise       Healthy diet/nutrition       Colorectal cancer screening       Prostate cancer screening        He reports that he has been smoking cigarettes and cigars. He has never used smokeless tobacco.  Nicotine/Tobacco Cessation Plan:   Information offered: Patient not interested at this time        Gerald Roche MD  [facility]

## 2023-04-19 DIAGNOSIS — E78.5 HYPERLIPIDEMIA LDL GOAL <70: ICD-10-CM

## 2023-04-19 DIAGNOSIS — I25.10 CARDIOVASCULAR DISEASE: ICD-10-CM

## 2023-04-19 RX ORDER — SIMVASTATIN 40 MG
40 TABLET ORAL AT BEDTIME
Qty: 90 TABLET | Refills: 3 | Status: SHIPPED | OUTPATIENT
Start: 2023-04-19 | End: 2024-05-03

## 2023-04-19 RX ORDER — LISINOPRIL 10 MG/1
10 TABLET ORAL DAILY
Qty: 90 TABLET | Refills: 3 | Status: SHIPPED | OUTPATIENT
Start: 2023-04-19 | End: 2024-05-03

## 2023-04-19 RX ORDER — ATENOLOL 50 MG/1
50 TABLET ORAL DAILY
Qty: 90 TABLET | Refills: 3 | Status: SHIPPED | OUTPATIENT
Start: 2023-04-19 | End: 2024-05-03

## 2023-04-19 NOTE — TELEPHONE ENCOUNTER
Pending Prescriptions:                       Disp   Refills    atenolol (TENORMIN) 50 MG tablet          90 tab*3            Sig: Take 1 tablet (50 mg) by mouth daily    lisinopril (ZESTRIL) 10 MG tablet         90 tab*3            Sig: Take 1 tablet (10 mg) by mouth daily    simvastatin (ZOCOR) 40 MG tablet          90 tab*3            Sig: Take 1 tablet (40 mg) by mouth At Bedtime at           bedtime.

## 2024-05-02 SDOH — HEALTH STABILITY: PHYSICAL HEALTH
ON AVERAGE, HOW MANY DAYS PER WEEK DO YOU ENGAGE IN MODERATE TO STRENUOUS EXERCISE (LIKE A BRISK WALK)?: PATIENT DECLINED

## 2024-05-02 SDOH — HEALTH STABILITY: PHYSICAL HEALTH: ON AVERAGE, HOW MANY MINUTES DO YOU ENGAGE IN EXERCISE AT THIS LEVEL?: PATIENT DECLINED

## 2024-05-02 ASSESSMENT — SOCIAL DETERMINANTS OF HEALTH (SDOH): HOW OFTEN DO YOU GET TOGETHER WITH FRIENDS OR RELATIVES?: PATIENT DECLINED

## 2024-05-03 ENCOUNTER — OFFICE VISIT (OUTPATIENT)
Dept: FAMILY MEDICINE | Facility: CLINIC | Age: 59
End: 2024-05-03
Payer: COMMERCIAL

## 2024-05-03 VITALS
HEIGHT: 69 IN | DIASTOLIC BLOOD PRESSURE: 80 MMHG | RESPIRATION RATE: 20 BRPM | SYSTOLIC BLOOD PRESSURE: 120 MMHG | OXYGEN SATURATION: 95 % | TEMPERATURE: 97.9 F | HEART RATE: 59 BPM | WEIGHT: 241 LBS | BODY MASS INDEX: 35.7 KG/M2

## 2024-05-03 DIAGNOSIS — I25.10 CARDIOVASCULAR DISEASE: ICD-10-CM

## 2024-05-03 DIAGNOSIS — Z00.00 ROUTINE GENERAL MEDICAL EXAMINATION AT A HEALTH CARE FACILITY: Primary | ICD-10-CM

## 2024-05-03 DIAGNOSIS — Z12.5 ENCOUNTER FOR SCREENING FOR MALIGNANT NEOPLASM OF PROSTATE: ICD-10-CM

## 2024-05-03 DIAGNOSIS — I10 ESSENTIAL HYPERTENSION: ICD-10-CM

## 2024-05-03 DIAGNOSIS — E66.01 CLASS 2 SEVERE OBESITY DUE TO EXCESS CALORIES WITH SERIOUS COMORBIDITY AND BODY MASS INDEX (BMI) OF 35.0 TO 35.9 IN ADULT (H): ICD-10-CM

## 2024-05-03 DIAGNOSIS — E66.812 CLASS 2 SEVERE OBESITY DUE TO EXCESS CALORIES WITH SERIOUS COMORBIDITY AND BODY MASS INDEX (BMI) OF 35.0 TO 35.9 IN ADULT (H): ICD-10-CM

## 2024-05-03 DIAGNOSIS — N52.9 ERECTILE DYSFUNCTION, UNSPECIFIED ERECTILE DYSFUNCTION TYPE: ICD-10-CM

## 2024-05-03 DIAGNOSIS — E78.5 HYPERLIPIDEMIA LDL GOAL <70: ICD-10-CM

## 2024-05-03 LAB
ANION GAP SERPL CALCULATED.3IONS-SCNC: 8 MMOL/L (ref 7–15)
BUN SERPL-MCNC: 16.6 MG/DL (ref 8–23)
CALCIUM SERPL-MCNC: 9 MG/DL (ref 8.8–10.2)
CHLORIDE SERPL-SCNC: 101 MMOL/L (ref 98–107)
CHOLEST SERPL-MCNC: 122 MG/DL
CREAT SERPL-MCNC: 0.97 MG/DL (ref 0.67–1.17)
DEPRECATED HCO3 PLAS-SCNC: 31 MMOL/L (ref 22–29)
EGFRCR SERPLBLD CKD-EPI 2021: 89 ML/MIN/1.73M2
FASTING STATUS PATIENT QL REPORTED: YES
GLUCOSE SERPL-MCNC: 107 MG/DL (ref 70–99)
HDLC SERPL-MCNC: 37 MG/DL
LDLC SERPL CALC-MCNC: 68 MG/DL
NONHDLC SERPL-MCNC: 85 MG/DL
POTASSIUM SERPL-SCNC: 4.6 MMOL/L (ref 3.4–5.3)
PSA SERPL DL<=0.01 NG/ML-MCNC: 0.48 NG/ML (ref 0–4.5)
SODIUM SERPL-SCNC: 140 MMOL/L (ref 135–145)
TRIGL SERPL-MCNC: 84 MG/DL

## 2024-05-03 PROCEDURE — 36415 COLL VENOUS BLD VENIPUNCTURE: CPT | Performed by: FAMILY MEDICINE

## 2024-05-03 PROCEDURE — 80048 BASIC METABOLIC PNL TOTAL CA: CPT | Performed by: FAMILY MEDICINE

## 2024-05-03 PROCEDURE — 99214 OFFICE O/P EST MOD 30 MIN: CPT | Mod: 25 | Performed by: FAMILY MEDICINE

## 2024-05-03 PROCEDURE — 90471 IMMUNIZATION ADMIN: CPT | Performed by: FAMILY MEDICINE

## 2024-05-03 PROCEDURE — G0103 PSA SCREENING: HCPCS | Performed by: FAMILY MEDICINE

## 2024-05-03 PROCEDURE — 80061 LIPID PANEL: CPT | Performed by: FAMILY MEDICINE

## 2024-05-03 PROCEDURE — 99396 PREV VISIT EST AGE 40-64: CPT | Mod: 25 | Performed by: FAMILY MEDICINE

## 2024-05-03 PROCEDURE — 90715 TDAP VACCINE 7 YRS/> IM: CPT | Performed by: FAMILY MEDICINE

## 2024-05-03 RX ORDER — NITROGLYCERIN 0.4 MG/1
0.4 TABLET SUBLINGUAL EVERY 5 MIN PRN
Qty: 25 TABLET | Refills: 3 | Status: SHIPPED | OUTPATIENT
Start: 2024-05-03

## 2024-05-03 RX ORDER — ACETAMINOPHEN 500 MG
1000 TABLET ORAL EVERY 8 HOURS PRN
COMMUNITY

## 2024-05-03 RX ORDER — ATENOLOL 50 MG/1
50 TABLET ORAL DAILY
Qty: 90 TABLET | Refills: 3 | Status: SHIPPED | OUTPATIENT
Start: 2024-05-03

## 2024-05-03 RX ORDER — SILDENAFIL 100 MG/1
100 TABLET, FILM COATED ORAL DAILY PRN
Qty: 18 TABLET | Refills: 3 | Status: SHIPPED | OUTPATIENT
Start: 2024-05-03

## 2024-05-03 RX ORDER — LISINOPRIL 10 MG/1
10 TABLET ORAL DAILY
Qty: 90 TABLET | Refills: 3 | Status: SHIPPED | OUTPATIENT
Start: 2024-05-03

## 2024-05-03 RX ORDER — SIMVASTATIN 40 MG
40 TABLET ORAL AT BEDTIME
Qty: 90 TABLET | Refills: 3 | Status: SHIPPED | OUTPATIENT
Start: 2024-05-03

## 2024-05-03 ASSESSMENT — PAIN SCALES - GENERAL: PAINLEVEL: NO PAIN (0)

## 2024-05-03 NOTE — PROGRESS NOTES
Preventive Care Visit  [facility]  Tommie Worrell DO, Family Medicine  May 3, 2024      Assessment & Plan     Routine general medical examination at a health care facility  Immunizations: tetanus today. Will check with insurance.   Cholesterol: simvastatin 40 mg daily.   Aspirin: 81 mg daily.   Diabetes: screen today   Colon Cancer: Colonoscopy 2018, repeat 10 years  Prostate: screen today.   Diet/Exercise: active outside.    Tobacco: smoking a couple cigarettes per day. Not interested in quitting.     Encounter for screening for malignant neoplasm of prostate  - PSA, screen    Hyperlipidemia LDL goal <70  Stable. Check labs today. Refill provided.   - Lipid panel reflex to direct LDL Fasting  - simvastatin (ZOCOR) 40 MG tablet  Dispense: 90 tablet; Refill: 3  - OFFICE/OUTPT VISIT,ESTLEVL IV    Essential hypertension  Cardiovascular disease  Stable.  On lisinopril and atenolol.  Blood pressure well-controlled.  Asymptomatic.  Has not used nitroglycerin in 10+ years.   - Basic metabolic panel  (Ca, Cl, CO2, Creat, Gluc, K, Na, BUN)  - OFFICE/OUTPT VISIT,ESTLEVL IV    Cardiovascular disease  - nitroGLYcerin (NITROSTAT) 0.4 MG sublingual tablet  Dispense: 25 tablet; Refill: 3  - lisinopril (ZESTRIL) 10 MG tablet  Dispense: 90 tablet; Refill: 3  - atenolol (TENORMIN) 50 MG tablet  Dispense: 90 tablet; Refill: 3    Erectile dysfunction, unspecified erectile dysfunction type  Stable. Refill provided.   - sildenafil (VIAGRA) 100 MG tablet  Dispense: 18 tablet; Refill: 3  - OFFICE/OUTPT VISIT,EST,LEVL IV    Class 2 severe obesity due to excess calories with serious comorbidity and body mass index (BMI) of 35.0 to 35.9 in adult (H)  BMI 35, HTN improve with weight loss.       The risks, benefits and treatment options of prescribed medications or other treatments have been discussed with the patient. The patient verbalized their understanding and should call or follow up if no improvement or if  "they develop further problems.      Patient has been advised of split billing requirements and indicates understanding: Yes      Nicotine/Tobacco Cessation  He reports that he has been smoking cigarettes. He started smoking about 52 years ago. He has a 25 pack-year smoking history. He has never used smokeless tobacco.  Nicotine/Tobacco Cessation Plan  Information offered: Patient not interested at this time      BMI  Estimated body mass index is 35.9 kg/m  as calculated from the following:    Height as of this encounter: 1.745 m (5' 8.7\").    Weight as of this encounter: 109.3 kg (241 lb).   Weight management plan: Discussed healthy diet and exercise guidelines    Counseling  Appropriate preventive services were discussed with this patient, including applicable screening as appropriate for fall prevention, nutrition, physical activity, Tobacco-use cessation, weight loss and cognition.  Checklist reviewing preventive services available has been given to the patient.  Reviewed patient's diet, addressing concerns and/or questions.   The patient was instructed to see the dentist every 6 months.   He is at risk for psychosocial distress and has been provided with information to reduce risk.         Marnie Quiros is a 58 year old, presenting for the following:  Physical, Lipids, and Hypertension        5/3/2024     9:11 AM   Additional Questions   Roomed by Mariely FUCHS        Health Care Directive  Patient does not have a Health Care Directive or Living Will: Discussed advance care planning with patient; information given to patient to review.    HPI    58 year old male who presents for annual exam.     Immunizations: tetanus today. Will check with insurance.   Cholesterol: simvastatin 40 mg daily.   Aspirin: 81 mg daily.   Diabetes: screen today   Colon Cancer: Colonoscopy 2018, repeat 10 years  Prostate: screen today.   Diet/Exercise: active outside.    Tobacco: smoking a couple cigarettes per day. Not interested in " quitting.       Hyperlipidemia Follow-Up    Are you regularly taking any medication or supplement to lower your cholesterol?   Yes- Zocor  Are you having muscle aches or other side effects that you think could be caused by your cholesterol lowering medication?  No    Has not used nitroglycerin in over 10 years.   Simvastatin 40 mg daily.       Hypertension Follow-up    Do you check your blood pressure regularly outside of the clinic? No   Are you following a low salt diet? No  Are your blood pressures ever more than 140 on the top number (systolic) OR more   than 90 on the bottom number (diastolic), for example 140/90? No      5/2/2024   General Health   How would you rate your overall physical health? (!) FAIR   Feel stress (tense, anxious, or unable to sleep) Only a little   (!) STRESS CONCERN      5/2/2024   Nutrition   Three or more servings of calcium each day? (!) NO   Diet: Regular (no restrictions)   How many servings of fruit and vegetables per day? (!) 0-1   How many sweetened beverages each day? (!) 2         5/2/2024   Exercise   Days per week of moderate/strenous exercise Patient declined   Average minutes spent exercising at this level Patient declined         5/2/2024   Social Factors   Frequency of gathering with friends or relatives Patient declined   Worry food won't last until get money to buy more No   Food not last or not have enough money for food? No   Do you have housing?  Yes   Are you worried about losing your housing? No   Lack of transportation? No   Unable to get utilities (heat,electricity)? No         5/2/2024   Fall Risk   Fallen 2 or more times in the past year? No   Trouble with walking or balance? No          5/2/2024   Dental   Dentist two times every year? (!) NO         5/2/2024   TB Screening   Were you born outside of the [place]? No         Today's PHQ-2 Score:       5/2/2024     6:11 PM   PHQ-2 ( 1999 [employer])   Q1: Little interest or pleasure in doing things 0   Q2: Feeling down,  "depressed or hopeless 0   PHQ-2 Score 0   Q1: Little interest or pleasure in doing things Not at all   Q2: Feeling down, depressed or hopeless Not at all   PHQ-2 Score 0           2024   Substance Use   Alcohol more than 3/day or more than 7/wk No   Do you use any other substances recreationally? No     Social History     Tobacco Use    Smoking status: Some Days     Current packs/day: 0.00     Average packs/day: 1 pack/day for 25.0 years (25.0 ttl pk-yrs)     Types: Cigarettes     Start date: 1972     Last attempt to quit: 2008     Years since quittin.3    Smokeless tobacco: Never    Tobacco comments:     Smoked for many years, quit for a few & recently started   Vaping Use    Vaping status: Never Used   Substance Use Topics    Alcohol use: Yes     Comment: Occasional drink    Drug use: No           2024   STI Screening   New sexual partner(s) since last STI/HIV test? No   Last PSA:   PSA   Date Value Ref Range Status   03/10/2021 0.56 0 - 4 ug/L Final     Comment:     Assay Method:  Chemiluminescence using Siemens Vista analyzer     Prostate Specific Antigen Screen   Date Value Ref Range Status   2023 0.45 0.00 - 3.50 ng/mL Final   2022 0.62 0.00 - 4.00 ug/L Final     ASCVD Risk   The ASCVD Risk score (Nidia ADAME, et al., 2019) failed to calculate for the following reasons:    The valid total cholesterol range is 130 to 320 mg/dL         Reviewed and updated as needed this visit by Provider                        Review of Systems  Constitutional, HEENT, cardiovascular, pulmonary, gi and gu systems are negative, except as otherwise noted.     Objective    Exam  /80 (BP Location: Right arm, Patient Position: Chair, Cuff Size: Adult Regular)   Pulse 59   Temp 97.9  F (36.6  C) (Oral)   Resp 20   Ht 1.745 m (5' 8.7\")   Wt 109.3 kg (241 lb)   SpO2 95%   BMI 35.90 kg/m     Estimated body mass index is 35.9 kg/m  as calculated from the following:    Height as of this " "encounter: 1.745 m (5' 8.7\").    Weight as of this encounter: 109.3 kg (241 lb).    Physical Exam  GENERAL: alert and no distress  EYES: Eyes grossly normal to inspection, PERRL and conjunctivae and sclerae normal  HENT: ear canals and TM's normal, nose and mouth without ulcers or lesions  NECK: no adenopathy, no asymmetry, masses, or scars  RESP: lungs clear to auscultation - no rales, rhonchi or wheezes  CV: regular rate and rhythm, normal S1 S2, no S3 or S4, no murmur, click or rub, no peripheral edema  ABDOMEN: soft, nontender, no hepatosplenomegaly, no masses and bowel sounds normal  MS: no gross musculoskeletal defects noted, no edema  SKIN: no suspicious lesions or rashes  NEURO: Normal strength and tone, mentation intact and speech normal  PSYCH: mentation appears normal, affect normal/bright        Signed Electronically by: Tommie Worrell DO    "

## 2024-05-03 NOTE — PATIENT INSTRUCTIONS
Continue current cares.   Med refills provided today.     Follow up yearly or sooner if needed.   Lab work today.     Preventive Care Advice   This is general advice given by our system to help you stay healthy. However, your care team may have specific advice just for you. Please talk to your care team about your preventive care needs.  Nutrition  Eat 5 or more servings of fruits and vegetables each day.  Try wheat bread, brown rice and whole grain pasta (instead of white bread, rice, and pasta).  Get enough calcium and vitamin D. Check the label on foods and aim for 100% of the RDA (recommended daily allowance).  Lifestyle  Exercise at least 150 minutes each week   (30 minutes a day, 5 days a week).  Do muscle strengthening activities 2 days a week. These help control your weight and prevent disease.  No smoking.  Wear sunscreen to prevent skin cancer.  Have a dental exam and cleaning every 6 months.  Yearly exams  See your health care team every year to talk about:  Any changes in your health.  Any medicines your care team has prescribed.  Preventive care, family planning, and ways to prevent chronic diseases.  Shots (vaccines)   HPV shots (up to age 26), if you've never had them before.  Hepatitis B shots (up to age 59), if you've never had them before.  COVID-19 shot: Get this shot when it's due.  Flu shot: Get a flu shot every year.  Tetanus shot: Get a tetanus shot every 10 years.  Pneumococcal, hepatitis A, and RSV shots: Ask your care team if you need these based on your risk.  Shingles shot (for age 50 and up).  General health tests  Diabetes screening:  Starting at age 35, Get screened for diabetes at least every 3 years.  If you are younger than age 35, ask your care team if you should be screened for diabetes.  Cholesterol test: At age 39, start having a cholesterol test every 5 years, or more often if advised.  Bone density scan (DEXA): At age 50, ask your care team if you should have this scan for  osteoporosis (brittle bones).  Hepatitis C: Get tested at least once in your life.  STIs (sexually transmitted infections)  Before age 24: Ask your care team if you should be screened for STIs.  After age 24: Get screened for STIs if you're at risk. You are at risk for STIs (including HIV) if:  You are sexually active with more than one person.  You don't use condoms every time.  You or a partner was diagnosed with a sexually transmitted infection.  If you are at risk for HIV, ask about PrEP medicine to prevent HIV.  Get tested for HIV at least once in your life, whether you are at risk for HIV or not.  Cancer screening tests  Cervical cancer screening: If you have a cervix, begin getting regular cervical cancer screening tests at age 21. Most people who have regular screenings with normal results can stop after age 65. Talk about this with your provider.  Breast cancer scan (mammogram): If you've ever had breasts, begin having regular mammograms starting at age 40. This is a scan to check for breast cancer.  Colon cancer screening: It is important to start screening for colon cancer at age 45.  Have a colonoscopy test every 10 years (or more often if you're at risk) Or, ask your provider about stool tests like a FIT test every year or Cologuard test every 3 years.  To learn more about your testing options, visit: https://www.Jobspotting/415436.pdf.  For help making a decision, visit: https://bit.ly/ji83300.  Prostate cancer screening test: If you have a prostate and are age 55 to 69, ask your provider if you would benefit from a yearly prostate cancer screening test.  Lung cancer screening: If you are a current or former smoker age 50 to 80, ask your care team if ongoing lung cancer screenings are right for you.  For informational purposes only. Not to replace the advice of your health care provider. Copyright   2023 [employer]. All rights reserved. Clinically reviewed by the [facility]  Transitions Program. [employer] 293987 - REV 01/24.    Learning About Stress  What is stress?     Stress is your body's response to a hard situation. Your body can have a physical, emotional, or mental response. Stress is a fact of life for most people, and it affects everyone differently. What causes stress for you may not be stressful for someone else.  A lot of things can cause stress. You may feel stress when you go on a job interview, take a test, or run a race. This kind of short-term stress is normal and even useful. It can help you if you need to work hard or react quickly. For example, stress can help you finish an important job on time.  Long-term stress is caused by ongoing stressful situations or events. Examples of long-term stress include long-term health problems, ongoing problems at work, or conflicts in your family. Long-term stress can harm your health.  How does stress affect your health?  When you are stressed, your body responds as though you are in danger. It makes hormones that speed up your heart, make you breathe faster, and give you a burst of energy. This is called the fight-or-flight stress response. If the stress is over quickly, your body goes back to normal and no harm is done.  But if stress happens too often or lasts too long, it can have bad effects. Long-term stress can make you more likely to get sick, and it can make symptoms of some diseases worse. If you tense up when you are stressed, you may develop neck, shoulder, or low back pain. Stress is linked to high blood pressure and heart disease.  Stress also harms your emotional health. It can make you cameron, tense, or depressed. Your relationships may suffer, and you may not do well at work or school.  What can you do to manage stress?  You can try these things to help manage stress:   Do something active. Exercise or activity can help reduce stress. Walking is a great way to get started. Even everyday activities such as  housecleaning or yard work can help.  Try yoga or bunny chi. These techniques combine exercise and meditation. You may need some training at first to learn them.  Do something you enjoy. For example, listen to music or go to a movie. Practice your hobby or do volunteer work.  Meditate. This can help you relax, because you are not worrying about what happened before or what may happen in the future.  Do guided imagery. Imagine yourself in any setting that helps you feel calm. You can use online videos, books, or a teacher to guide you.  Do breathing exercises. For example:  From a standing position, bend forward from the waist with your knees slightly bent. Let your arms dangle close to the floor.  Breathe in slowly and deeply as you return to a standing position. Roll up slowly and lift your head last.  Hold your breath for just a few seconds in the standing position.  Breathe out slowly and bend forward from the waist.  Let your feelings out. Talk, laugh, cry, and express anger when you need to. Talking with supportive friends or family, a counselor, or a joseph leader about your feelings is a healthy way to relieve stress. Avoid discussing your feelings with people who make you feel worse.  Write. It may help to write about things that are bothering you. This helps you find out how much stress you feel and what is causing it. When you know this, you can find better ways to cope.  What can you do to prevent stress?  You might try some of these things to help prevent stress:  Manage your time. This helps you find time to do the things you want and need to do.  Get enough sleep. Your body recovers from the stresses of the day while you are sleeping.  Get support. Your family, friends, and community can make a difference in how you experience stress.  Limit your news feed. Avoid or limit time on social media or news that may make you feel stressed.  Do something active. Exercise or activity can help reduce stress.  "Walking is a great way to get started.  Where can you learn more?  Go to https://www.YOLLEGE.net/patiented  Enter N032 in the search box to learn more about \"Learning About Stress.\"  Current as of: October 24, 2023               Content Version: 14.0    8755-1979 [employer].   Care instructions adapted under license by your healthcare professional. If you have questions about a medical condition or this instruction, always ask your healthcare professional. Health[employer], [employer] disclaims any warranty or liability for your use of this information.      "

## 2024-05-03 NOTE — NURSING NOTE
Prior to immunization administration, verified patients identity using patient s name and date of birth. Please see Immunization Activity for additional information.     Screening Questionnaire for Adult Immunization    Are you sick today?   No   Do you have allergies to medications, food, a vaccine component or latex?   No   Have you ever had a serious reaction after receiving a vaccination?   No   Do you have a long-term health problem with heart, lung, kidney, or metabolic disease (e.g., diabetes), asthma, a blood disorder, no spleen, complement component deficiency, a cochlear implant, or a spinal fluid leak?  Are you on long-term aspirin therapy?   No   Do you have cancer, leukemia, HIV/AIDS, or any other immune system problem?   No   Do you have a parent, brother, or sister with an immune system problem?   No   In the past 3 months, have you taken medications that affect  your immune system, such as prednisone, other steroids, or anticancer drugs; drugs for the treatment of rheumatoid arthritis, Crohn s disease, or psoriasis; or have you had radiation treatments?   No   Have you had a seizure, or a brain or other nervous system problem?   No   During the past year, have you received a transfusion of blood or blood    products, or been given immune (gamma) globulin or antiviral drug?   No   For women: Are you pregnant or is there a chance you could become       pregnant during the next month?   No   Have you received any vaccinations in the past 4 weeks?   No     Immunization questionnaire answers were all negative.      Patient instructed to remain in clinic for 15 minutes afterwards, and to report any adverse reactions.     Screening performed by Mariely Gordon CMA on 5/3/2024 at 9:18 AM.

## 2024-05-06 PROBLEM — R73.03 PREDIABETES: Status: ACTIVE | Noted: 2024-05-06

## 2024-05-30 ENCOUNTER — MYC MEDICAL ADVICE (OUTPATIENT)
Dept: FAMILY MEDICINE | Facility: CLINIC | Age: 59
End: 2024-05-30
Payer: COMMERCIAL

## 2024-05-30 DIAGNOSIS — Z23 NEED FOR SHINGLES VACCINE: Primary | ICD-10-CM

## 2024-06-05 NOTE — TELEPHONE ENCOUNTER
I have placed an order for a shingles vaccine. I am not sure what needs to be faxed to insurance? The order? Please assist with faxing and next steps.

## 2024-06-07 ENCOUNTER — ALLIED HEALTH/NURSE VISIT (OUTPATIENT)
Dept: FAMILY MEDICINE | Facility: CLINIC | Age: 59
End: 2024-06-07
Payer: COMMERCIAL

## 2024-06-07 DIAGNOSIS — Z23 NEED FOR VACCINATION: Primary | ICD-10-CM

## 2024-06-07 PROCEDURE — 99207 PR NO CHARGE NURSE ONLY: CPT

## 2024-06-07 PROCEDURE — 90471 IMMUNIZATION ADMIN: CPT

## 2024-06-07 PROCEDURE — 90750 HZV VACC RECOMBINANT IM: CPT

## 2024-06-07 NOTE — PROGRESS NOTES
Prior to immunization administration, verified patients identity using patient s name and date of birth. Please see Immunization Activity for additional information.     Screening Questionnaire for Adult Immunization    Are you sick today?   No   Do you have allergies to medications, food, a vaccine component or latex?   No   Have you ever had a serious reaction after receiving a vaccination?   No   Do you have a long-term health problem with heart, lung, kidney, or metabolic disease (e.g., diabetes), asthma, a blood disorder, no spleen, complement component deficiency, a cochlear implant, or a spinal fluid leak?  Are you on long-term aspirin therapy?   No   Do you have cancer, leukemia, HIV/AIDS, or any other immune system problem?   No   Do you have a parent, brother, or sister with an immune system problem?   No   In the past 3 months, have you taken medications that affect  your immune system, such as prednisone, other steroids, or anticancer drugs; drugs for the treatment of rheumatoid arthritis, Crohn s disease, or psoriasis; or have you had radiation treatments?   No   Have you had a seizure, or a brain or other nervous system problem?   No   During the past year, have you received a transfusion of blood or blood    products, or been given immune (gamma) globulin or antiviral drug?   No   For women: Are you pregnant or is there a chance you could become       pregnant during the next month?   No   Have you received any vaccinations in the past 4 weeks?   No     Immunization questionnaire answers were all negative.    I have reviewed the following standing orders:   This patient is due and qualifies for the Zoster vaccine.    Click here for Zoster Standing Order    I have reviewed the vaccines inclusion and exclusion criteria; No concerns regarding eligibility.     Patient instructed to remain in clinic for 15 minutes afterwards, and to report any adverse reactions.     Screening performed by Monica Morrison  MA on 6/7/2024 at 2:06 PM.

## 2024-06-07 NOTE — TELEPHONE ENCOUNTER
Per station  note, order has been faxed to insurance company.    Karina FOSTER LPN on 6/7/2024 at 4:34 PM

## 2024-08-23 ENCOUNTER — ALLIED HEALTH/NURSE VISIT (OUTPATIENT)
Dept: FAMILY MEDICINE | Facility: CLINIC | Age: 59
End: 2024-08-23
Payer: COMMERCIAL

## 2024-08-23 VITALS — TEMPERATURE: 97.4 F

## 2024-08-23 DIAGNOSIS — Z23 ENCOUNTER FOR IMMUNIZATION: Primary | ICD-10-CM

## 2024-08-23 PROCEDURE — 90471 IMMUNIZATION ADMIN: CPT

## 2024-08-23 PROCEDURE — 99207 PR NO CHARGE NURSE ONLY: CPT

## 2024-08-23 PROCEDURE — 90750 HZV VACC RECOMBINANT IM: CPT

## 2024-08-23 NOTE — PROGRESS NOTES
Prior to immunization administration, verified patients identity using patient s name and date of birth. Please see Immunization Activity for additional information.     Is the patient's temperature normal (100.5 or less)? Yes     Patient MEETS CRITERIA. PROCEED with vaccine administration.          8/23/2024   Zoster   Have you had a serious reaction to the shingles vaccine or something in the shingles vaccine? No   Do you have shingles now? No   Are you getting treatment for cancer, organ transplant, or bone marrow transplant? No   Do you have an autoimmune or inflammatory condition? No   Is the patient immunocompromised and wanting to complete the Shingles vaccine series in less than 2 months? No   Have you had Guillain-Gaston syndrome within 6 weeks of getting a vaccine? No            Patient MEETS CRITERIA. PROCEED with vaccine administration.      Patient instructed to remain in clinic for 15 minutes afterwards, and to report any adverse reactions.      Link to Ancillary Visit Immunization Standing Orders SmartSet     Screening performed by Tosha Ewing CMA on 8/23/2024 at 4:04 PM.

## 2025-04-18 ENCOUNTER — ANCILLARY PROCEDURE (OUTPATIENT)
Dept: GENERAL RADIOLOGY | Facility: CLINIC | Age: 60
End: 2025-04-18
Attending: FAMILY MEDICINE
Payer: COMMERCIAL

## 2025-04-18 DIAGNOSIS — G89.29 CHRONIC PAIN OF BOTH KNEES: ICD-10-CM

## 2025-04-18 DIAGNOSIS — M25.562 CHRONIC PAIN OF BOTH KNEES: ICD-10-CM

## 2025-04-18 DIAGNOSIS — M25.561 CHRONIC PAIN OF BOTH KNEES: ICD-10-CM

## 2025-04-18 PROCEDURE — 73560 X-RAY EXAM OF KNEE 1 OR 2: CPT | Mod: TC | Performed by: RADIOLOGY

## 2025-04-21 ENCOUNTER — PATIENT OUTREACH (OUTPATIENT)
Dept: CARE COORDINATION | Facility: CLINIC | Age: 60
End: 2025-04-21
Payer: COMMERCIAL

## 2025-04-21 ENCOUNTER — MYC MEDICAL ADVICE (OUTPATIENT)
Dept: FAMILY MEDICINE | Facility: CLINIC | Age: 60
End: 2025-04-21
Payer: COMMERCIAL

## 2025-04-21 NOTE — LETTER
April 28, 2025      Latre[place] Al  [id number] [address] [id number]        To Whom It May Concern:    Due to medical condition please limit patient to 40 hours per week of work. Okay for patient to work four 10 hour days in one week.   End time for this restriction unable to be determined, patient sent to specialist for further evaluation and cares.     Sincerely,        Tommie Worrell, DO    Electronically signed

## 2025-04-22 NOTE — TELEPHONE ENCOUNTER
See [employer] message, routing to PCP for review. Letter was written on 4/18/25, pt's requesting additional information to be added.     Estrella Bush RN  [facility]

## 2025-04-24 NOTE — TELEPHONE ENCOUNTER
Writer was notified by AFR that pt is here in the clinic to follow up on request for updated letter (see [employer] message and previous note below). Routing high priority to PCP for review, and Epic secure message also sent; PCP is not in clinic today, so unsure if this will be addressed today or tomorrow when PCP is back in clinic. AFR will notify pt of this.    Estrella Bush RN  [facility]

## 2025-04-28 NOTE — TELEPHONE ENCOUNTER
Spouse picked up phone. No C2C on file. I informed her Ill send a mychart and pt can callback if he has any questions.    Elizabeth Menard on 4/28/2025 at 10:32 AM

## 2025-05-02 ENCOUNTER — ANCILLARY PROCEDURE (OUTPATIENT)
Dept: GENERAL RADIOLOGY | Facility: CLINIC | Age: 60
End: 2025-05-02
Attending: FAMILY MEDICINE
Payer: COMMERCIAL

## 2025-05-02 DIAGNOSIS — G89.29 CHRONIC PAIN OF BOTH KNEES: ICD-10-CM

## 2025-05-02 DIAGNOSIS — M25.562 CHRONIC PAIN OF BOTH KNEES: ICD-10-CM

## 2025-05-02 DIAGNOSIS — M25.561 CHRONIC PAIN OF BOTH KNEES: ICD-10-CM

## 2025-05-02 PROCEDURE — 73560 X-RAY EXAM OF KNEE 1 OR 2: CPT | Mod: TC | Performed by: RADIOLOGY

## 2025-05-06 ENCOUNTER — MYC MEDICAL ADVICE (OUTPATIENT)
Dept: FAMILY MEDICINE | Facility: CLINIC | Age: 60
End: 2025-05-06
Payer: COMMERCIAL

## 2025-05-07 NOTE — TELEPHONE ENCOUNTER
[facility] paperwork received and will be routed to provider for review.    Elly Booker on 5/7/2025 at 7:58 AM

## 2025-05-11 ENCOUNTER — HOSPITAL ENCOUNTER (OUTPATIENT)
Dept: CT IMAGING | Facility: CLINIC | Age: 60
Discharge: HOME | End: 2025-05-11
Attending: FAMILY MEDICINE | Admitting: FAMILY MEDICINE
Payer: COMMERCIAL

## 2025-05-11 DIAGNOSIS — Z87.891 PERSONAL HISTORY OF TOBACCO USE: ICD-10-CM

## 2025-05-11 PROCEDURE — 71271 CT THORAX LUNG CANCER SCR C-: CPT

## 2025-05-12 ENCOUNTER — RESULTS FOLLOW-UP (OUTPATIENT)
Dept: FAMILY MEDICINE | Facility: CLINIC | Age: 60
End: 2025-05-12

## 2025-05-12 DIAGNOSIS — R91.8 PULMONARY NODULES: Primary | ICD-10-CM

## (undated) RX ORDER — GLYCOPYRROLATE 0.2 MG/ML
INJECTION, SOLUTION INTRAMUSCULAR; INTRAVENOUS
Status: DISPENSED
Start: 2018-07-30